# Patient Record
Sex: FEMALE | Race: WHITE | NOT HISPANIC OR LATINO | Employment: STUDENT | URBAN - METROPOLITAN AREA
[De-identification: names, ages, dates, MRNs, and addresses within clinical notes are randomized per-mention and may not be internally consistent; named-entity substitution may affect disease eponyms.]

---

## 2022-11-29 PROBLEM — Z30.41 ENCOUNTER FOR SURVEILLANCE OF CONTRACEPTIVE PILLS: Status: ACTIVE | Noted: 2022-11-29

## 2022-12-20 PROBLEM — K50.10 CROHN'S DISEASE OF COLON WITHOUT COMPLICATION (HCC): Status: ACTIVE | Noted: 2022-12-20

## 2023-03-10 ENCOUNTER — OFFICE VISIT (OUTPATIENT)
Dept: ENDOCRINOLOGY | Facility: HOSPITAL | Age: 28
End: 2023-03-10

## 2023-03-10 VITALS
HEIGHT: 65 IN | BODY MASS INDEX: 35.82 KG/M2 | HEART RATE: 81 BPM | WEIGHT: 215 LBS | OXYGEN SATURATION: 97 % | DIASTOLIC BLOOD PRESSURE: 88 MMHG | SYSTOLIC BLOOD PRESSURE: 114 MMHG

## 2023-03-10 DIAGNOSIS — E55.9 VITAMIN D DEFICIENCY: ICD-10-CM

## 2023-03-10 DIAGNOSIS — E21.3 HYPERPARATHYROIDISM (HCC): ICD-10-CM

## 2023-03-10 DIAGNOSIS — E03.8 HYPOTHYROIDISM DUE TO HASHIMOTO'S THYROIDITIS: Primary | ICD-10-CM

## 2023-03-10 DIAGNOSIS — E06.3 HYPOTHYROIDISM DUE TO HASHIMOTO'S THYROIDITIS: Primary | ICD-10-CM

## 2023-03-10 LAB
25(OH)D3+25(OH)D2 SERPL-MCNC: 46.1 NG/ML (ref 30–100)
PTH-INTACT SERPL-MCNC: 23 PG/ML (ref 15–65)
T4 FREE SERPL-MCNC: 1.35 NG/DL (ref 0.82–1.77)
TSH SERPL DL<=0.005 MIU/L-ACNC: 1.33 UIU/ML (ref 0.45–4.5)

## 2023-03-10 RX ORDER — LEVOTHYROXINE SODIUM 0.1 MG/1
TABLET ORAL
Qty: 90 TABLET | Refills: 3 | Status: SHIPPED | OUTPATIENT
Start: 2023-03-10 | End: 2023-03-10 | Stop reason: SDUPTHER

## 2023-03-10 RX ORDER — LEVOTHYROXINE SODIUM 100 MCG
TABLET ORAL
Qty: 90 TABLET | Refills: 3 | Status: SHIPPED | OUTPATIENT
Start: 2023-03-10

## 2023-03-10 NOTE — PATIENT INSTRUCTIONS
We will call once results are in  Continue with current dose of Synthroid at this time  Follow up in 1 year  Call with any concerns

## 2023-03-10 NOTE — PROGRESS NOTES
Leona Wise 24 y o  female MRN: [id number]    Encounter: [id number]      Assessment/Plan     Assessment: This is a 24y o -year-old female with hypothyroidism due to Hashimoto's thyroiditis, elevated PTH  Plan:  1  Hypothyroidism due to Hashimoto's thyroiditis:  Lab work was completed yesterday, but results are not in the system  TSH in August was in normal range  Clinically euthyroid  She will continue with Synthroid 100 mcg daily  Did ask her to contact the office when she finds out she is pregnant  Follow-up in 1 year with lab work completed prior to visit      2  Hyperparathyroidism: Lab work was completed yesterday  Vitamin D was in normal range  Still waiting for results of CMP and PTH  Pending results may make further recommendations  At this time I do suspect that PTH has returned to normal range  CC: Thyroid and parathyroid follow-up    History of Present Illness     HPI:  Norberto Green Noekiley is a 24year old female with hypothyroidism due to Hashimoto's thyroiditis, common variable immunodeficiency, vitamin-D deficiency, colitis presents for follow-up   She was diagnosed with hypothyroidism about 6 years ago  She was initially on levothyroxine, but was switched to Synthroid 100 mcg daily    She takes medication appropriately  Does have a history of endometriosis   Overall she is feeling okay   No significant neck compressive symptoms  Denies any fatigue, heat or cold intolerance, chest pain, palpitations, abdominal pain, constipation, tremors, anxiety or depression, hair loss, dry skin, insomnia  Has some diarrhea due to her UC  Of note with her initial lab work she was noted to have a slightly elevated PTH  No history of hypercalcemia  Lab work was completed yesterday, but we do not have resulted PTH yet  He is graduating from college in the spring as a PTA    Does have a job lined up at [address] [place] [address]   Is also planning a trip to [place] for her [employer] after [facility]  Review of Systems   Constitutional: Negative for activity change, appetite change, diaphoresis, fatigue and unexpected weight change  HENT: Negative for sore throat, trouble swallowing and voice change  Eyes: Negative for visual disturbance  Respiratory: Negative for chest tightness and shortness of breath  Cardiovascular: Negative for chest pain, palpitations and leg swelling  Gastrointestinal: Positive for diarrhea  Negative for abdominal pain and constipation  Endocrine: Negative for cold intolerance, heat intolerance, polydipsia, polyphagia and polyuria  Skin: Negative for rash  Neurological: Negative for dizziness, tremors, light-headedness, numbness and headaches  Hematological: Negative for adenopathy  Psychiatric/Behavioral: Negative for dysphoric mood and sleep disturbance  The patient is not nervous/anxious          Historical Information   Past Medical History:   Diagnosis Date   • Anemia    • Anxiety    • Asthma    • Colitis, chronic, ulcerative (HCC)    • Disease of thyroid gland    • Endometriosis    • GERD (gastroesophageal reflux disease)    • Hashimoto's disease    • Hyperthyroidism    • Immune deficiency disorder ([facility] [address] )    • Migraine    • Urinary tract infection      Past Surgical History:   Procedure Laterality Date   • COLPOSCOPY     • EAR SURGERY Bilateral    • MOLE REMOVAL       Social History   Social History     Substance and Sexual Activity   Alcohol Use Yes   • Alcohol/week: 0 0 standard drinks    Comment: Socially      Social History     Substance and Sexual Activity   Drug Use Never     Social History     Tobacco Use   Smoking Status Never   Smokeless Tobacco Never     Family History:   Family History   Problem Relation Age of Onset   • Diabetes Mother    • Migraines Mother    • DEDE disease Mother    • Bipolar disorder Father    • Anxiety disorder Father    • Hypertension Father    • Hypertension Maternal Grandmother        Meds/Allergies   Current Outpatient Medications   Medication Sig Dispense Refill   • methylphenidate (Concerta) 27 MG ER tablet Take 1 tablet (27 mg total) by mouth daily Max Daily Amount: 27 mg 30 tablet 0   • omeprazole (PriLOSEC) 40 MG capsule      • Synthroid 100 MCG tablet 1 tab daily 90 tablet 3   • Ubrogepant (Ubrelvy) 100 MG tablet Take 1 tablet (100 mg total) by mouth as needed (migraine) May repeat in 2 hours if needed  Limit of 200 mg in 24 hours 10 tablet 6   • Adalimumab 40 MG/0 4ML PSKT Inject 1 Dose under the skin every 7 days      • budesonide (PULMICORT) 0 5 mg/2 mL nebulizer solution Inhale as needed      • cholecalciferol (VITAMIN D3) 1,000 units tablet Take 1,000 Units by mouth daily     • EPINEPHrine (EPIPEN JR) 0 15 mg/0 3 mL SOAJ Inject 0 15 mg into a muscle once as needed for anaphylaxis     • fluticasone (FLONASE) 50 mcg/act nasal spray 2 sprays into each nostril as needed for rhinitis      • Fluticasone-Salmeterol (Advair) 100-50 mcg/dose inhaler Inhale 1 puff 2 (two) times a day Rinse mouth after use   60 blister 3   • Immune Globulin, Human, (HIZENTRA SC) Inject 1 Dose under the skin once a week     • levalbuterol (XOPENEX HFA) 45 mcg/act inhaler Inhale 2 puffs every 6 (six) hours as needed for wheezing or shortness of breath 15 g 4   • levocetirizine (XYZAL) 5 MG tablet Take 5 mg by mouth every evening     • levonorgestrel-ethinyl estradiol (Jolessa) 0 15-0 03 MG per tablet Take 1 tablet by mouth daily 91 tablet 3   • loratadine (CLARITIN) 10 mg tablet Take 10 mg by mouth daily at bedtime     • magnesium oxide (MAG-OX) 400 mg Take 1 tablet (400 mg total) by mouth 2 (two) times a day 60 tablet 3   • Mefenamic Acid 250 MG CAPS Take 1 capsule (250 mg total) by mouth 4 (four) times a day as needed (menstrual cramping with food) for up to 7 days 1 as needed during menses for cramping 28 capsule 0   • Melatonin 10 MG TABS Take 1 tablet by mouth daily at bedtime     • Multiple Vitamin (MULTIVITAMIN) tablet Take 1 tablet by mouth daily     • Multiple Vitamin (MULTIVITAMINS PO) Take 1 tablet by mouth     • OLANZapine (ZyPREXA) 5 mg tablet Take 1 tablet (5 mg total) by mouth daily at bedtime (Patient not taking: Reported on 3/10/2023) 90 tablet 0   • olopatadine HCl (PATADAY) 0 2 % opth drops 2 drops each eye as needed     • omeprazole (PriLOSEC) 20 mg delayed release capsule Take 20 mg by mouth daily  3   • prochlorperazine (COMPAZINE) 10 mg tablet 1 tab at onset of migraine, can repeat in 8 hours, can take with triptan/NSAID 20 tablet 3   • protriptyline (VIVACTIL) 5 MG tablet TAKE 3 TABLETS DAILY (Patient not taking: Reported on 3/10/2023) 270 tablet 3     No current facility-administered medications for this visit  Allergies   Allergen Reactions   • Bee Venom Anaphylaxis     breathing  breathing     • Cinnamon - Food Allergy Anaphylaxis     Lab test 0  Per pt she has tolerated many times by accident, although has not had formal food challenge by allergy   • Albuterol Tachycardia       Objective   Vitals: Blood pressure 114/88, pulse 81, height 5' 5" (1 651 m), weight 97 5 kg (215 lb), SpO2 97 %  Physical Exam  Vitals and nursing note reviewed  Constitutional:       General: She is not in acute distress  Appearance: Normal appearance  She is not diaphoretic  HENT:      Head: Normocephalic and atraumatic  Eyes:      General: No scleral icterus  Extraocular Movements: Extraocular movements intact  Conjunctiva/sclera: Conjunctivae normal       Pupils: Pupils are equal, round, and reactive to light  Cardiovascular:      Rate and Rhythm: Normal rate and regular rhythm  Heart sounds: No murmur heard  Pulmonary:      Effort: Pulmonary effort is normal  No respiratory distress  Breath sounds: Normal breath sounds  No wheezing  Musculoskeletal:      Cervical back: Normal range of motion  Right lower leg: No edema  Left lower leg: No edema     Lymphadenopathy:      Cervical: No cervical adenopathy  Neurological:      Mental Status: She is alert and oriented to person, place, and time  Mental status is at baseline  Sensory: No sensory deficit  Motor: No tremor  Deep Tendon Reflexes:      Reflex Scores:       Patellar reflexes are 2+ on the right side and 2+ on the left side  Psychiatric:         Mood and Affect: Mood normal          Behavior: Behavior normal          Thought Content: Thought content normal          The history was obtained from the review of the chart, patient  Portions of the record may have been created with voice recognition software  Occasional wrong word or "sound a like" substitutions may have occurred due to the inherent limitations of voice recognition software  Read the chart carefully and recognize, using context, where substitutions have occurred

## 2023-03-12 DIAGNOSIS — F90.2 ADHD (ATTENTION DEFICIT HYPERACTIVITY DISORDER), COMBINED TYPE: ICD-10-CM

## 2023-03-13 RX ORDER — METHYLPHENIDATE HYDROCHLORIDE 27 MG/1
27 TABLET ORAL DAILY
Qty: 30 TABLET | Refills: 0 | Status: SHIPPED | OUTPATIENT
Start: 2023-03-13

## 2023-05-14 DIAGNOSIS — F90.2 ADHD (ATTENTION DEFICIT HYPERACTIVITY DISORDER), COMBINED TYPE: ICD-10-CM

## 2023-05-16 ENCOUNTER — APPOINTMENT (OUTPATIENT)
Dept: LAB | Facility: MEDICAL CENTER | Age: 28
End: 2023-05-16

## 2023-05-16 DIAGNOSIS — Z11.1 SCREENING-PULMONARY TB: ICD-10-CM

## 2023-05-16 RX ORDER — METHYLPHENIDATE HYDROCHLORIDE 27 MG/1
27 TABLET ORAL DAILY
Qty: 30 TABLET | Refills: 0 | Status: SHIPPED | OUTPATIENT
Start: 2023-05-16

## 2023-05-18 LAB
GAMMA INTERFERON BACKGROUND BLD IA-ACNC: 0.05 IU/ML
M TB IFN-G BLD-IMP: NEGATIVE
M TB IFN-G CD4+ BCKGRND COR BLD-ACNC: -0.02 IU/ML
M TB IFN-G CD4+ BCKGRND COR BLD-ACNC: -0.03 IU/ML
MITOGEN IGNF BCKGRD COR BLD-ACNC: >10 IU/ML

## 2023-06-20 DIAGNOSIS — F90.2 ADHD (ATTENTION DEFICIT HYPERACTIVITY DISORDER), COMBINED TYPE: ICD-10-CM

## 2023-06-20 RX ORDER — METHYLPHENIDATE HYDROCHLORIDE 27 MG/1
27 TABLET ORAL DAILY
Qty: 30 TABLET | Refills: 0 | Status: SHIPPED | OUTPATIENT
Start: 2023-06-20 | End: 2023-07-25 | Stop reason: SDUPTHER

## 2023-07-25 DIAGNOSIS — F90.2 ADHD (ATTENTION DEFICIT HYPERACTIVITY DISORDER), COMBINED TYPE: ICD-10-CM

## 2023-07-25 RX ORDER — METHYLPHENIDATE HYDROCHLORIDE 27 MG/1
27 TABLET ORAL DAILY
Qty: 30 TABLET | Refills: 0 | Status: SHIPPED | OUTPATIENT
Start: 2023-07-25

## 2023-09-07 ENCOUNTER — RA CDI HCC (OUTPATIENT)
Dept: OTHER | Facility: HOSPITAL | Age: 28
End: 2023-09-07

## 2023-09-07 NOTE — PROGRESS NOTES
Asthma, moderate persistentNoted 4/14/2004  [J45.40]  [address] [facility] coding opportunities          Chart Reviewed number of suggestions sent to Provider: 1     Patients Insurance        Commercial Insurance: Julian Medeiros

## 2023-09-13 ENCOUNTER — OFFICE VISIT (OUTPATIENT)
Dept: GASTROENTEROLOGY | Facility: CLINIC | Age: 28
End: 2023-09-13
Payer: COMMERCIAL

## 2023-09-13 VITALS
OXYGEN SATURATION: 98 % | HEART RATE: 76 BPM | HEIGHT: 65 IN | SYSTOLIC BLOOD PRESSURE: 122 MMHG | WEIGHT: 224.6 LBS | BODY MASS INDEX: 37.42 KG/M2 | TEMPERATURE: 98.2 F | DIASTOLIC BLOOD PRESSURE: 85 MMHG

## 2023-09-13 DIAGNOSIS — K52.9 IBD (INFLAMMATORY BOWEL DISEASE): ICD-10-CM

## 2023-09-13 DIAGNOSIS — Z79.899 IMMUNOSUPPRESSION DUE TO DRUG THERAPY: Primary | ICD-10-CM

## 2023-09-13 DIAGNOSIS — D84.821 IMMUNOSUPPRESSION DUE TO DRUG THERAPY: Primary | ICD-10-CM

## 2023-09-13 DIAGNOSIS — K21.9 GASTROESOPHAGEAL REFLUX DISEASE, UNSPECIFIED WHETHER ESOPHAGITIS PRESENT: ICD-10-CM

## 2023-09-13 PROCEDURE — 99244 OFF/OP CNSLTJ NEW/EST MOD 40: CPT | Performed by: INTERNAL MEDICINE

## 2023-09-13 RX ORDER — OMEPRAZOLE 40 MG/1
40 CAPSULE, DELAYED RELEASE ORAL DAILY
Qty: 90 CAPSULE | Refills: 3 | Status: SHIPPED | OUTPATIENT
Start: 2023-09-13

## 2023-09-13 NOTE — Clinical Note
Hi, she is establishing care with us and is on weekly Humira. We should take over the prescribing. Thank you.

## 2023-09-13 NOTE — PROGRESS NOTES
Gastroenterology Outpatient Follow-up - Crohn's Disease  Amparo Henson 24 y.o. female MRN: [id number]  Encounter: [id number]    Amparo Henson is a 24 y.o. female with Ulcerative colitis. Symptom onset:  2006  Diagnosis:  ulcerative colitis  Year of diagnosis:  2018    IBD Summary: Amparo Henson has C VID and ulcerative pancolitis. She was a nonresponder to mesalamine and 6-MP and is in clinical and endoscopic remission on weekly adalimumab. Ulcerative Colitis Summary  Macroscopic extent of diseases: pancolitis  Microscopic extent of diseases:  pancolitis  Has the patient ever been hospitalized for severe disease: No        Surgical History  Number of IBD surgeries: 0      First IBD surgery:    Most Recent IBD surgery:    Esophageal:  0    Gastroduodenal:  0    Small bowel resection(s):  0    Ileocolonic resection(s): 0      Colonic resection(s):  0    Ileostomy or colostomy:  no previous ostomy    Complete colectomy:  No         Medications     Year last used Reason for discontinuation   Corticosteroids prior   2019       Thiopurines prior   2018 [place]     Methotrexate   never         Infliximab   never         Adalimumab prior     CR Required dose escalation to weekly. Certolizumab   never         Golimumab   never         Natalizumab   never         Mesalamine prior     AE Worsening diarrhea.    Sulfasalzine   never         Vedolizumab   never         Ustekinumab   never         Tofacitinib   never         Other biologic   never             Extraintestinal Manifestations    IBD-associated arthropathy No    Uveitis No    Oral aphthous ulcers No   Erythema nodosum No    Pyoderma gangrenosum No    Primary sclerosing cholangitis No    Thrombotic complications No          Cancer / Dysplasia History  History of IBD-associated dysplasia: none    Date of diagnosis (Year):     History of colorectal cancer: No   History of cervical dysplasia: No   History of skin cancer: none         Laboratory Data   Most recent (date) Result   PPD   unknown   Quanitferon gold 5/16/2023 negative   TPMT 9/22/2018 low   Hepatitis A   unknown   HBsAb 9/22/2020 positive   HBcAb 9/22/2020 negative   HBsAg 9/14/2023 negative   HCV Ab   unknown       Imaging / Diagnostic Procedures   Most recent (date) Findings   Colonoscopy or sigmoidoscopy #1 4/1/2022            Ulcers/Erosions  no erosions           Strictures  none           Stricture Severity  N/A           Endoscopic Score Quiros Score:  0 Rutgeert's:  N/A            Findings  Internal hemorrhoids were found. The hemorrhoids were small.  - There is no visible evidence of active colitis seen. - The colonoscope was advanced to the cecum and then advanced 5 cm into the terminal ileum which was normal.  - Narrow band imaging was performed throughout the colon. - Surveillace biopsies were otained throughout the colon. Pathology  A. Small intestine, duodenum, biopsy:  - Focal chronic duodenitis. B. Stomach, polyp, polypectomy:  - Fundic gland polyp. C. Esophagus, gastroesophageal junction, biopsy:  - Cardio-oxyntic mucosa with mild chronic active inflammation, negative for intestinal metaplasia and dysplasia. D. Colon, cecum, biopsy:  - Colonic mucosa with no specific pathologic change. E. Colon, ascending, biopsy:  - Colonic mucosa with no specific pathologic change. F. Colon, proximal transverse, biopsy:  - Colonic mucosa with no specific pathologic change. G. Colon, distal transverse, biopsy:  - Colonic mucosa with no specific pathologic change. H. Colon, @ 50 cm, biopsy:  - Colonic mucosa with no specific pathologic change. I. Colon, @ 40 cm, biopsy:  - Colonic mucosa with no specific pathologic change. J. Colon, @ 30cm, biopsy:  - Colonic mucosa with no specific pathologic change. K. Colon, @ 20 cm, biopsy:  - Colonic mucosa with no specific pathologic change. L. Rectum, biopsy:  - Colonic mucosa with no specific pathologic change.    Colonoscopy or sigmoidoscopy #2 Ulcers/Erosions                 Strictures                 Stricture Severity              Endoscopic Score Quiros Score:    Rugeert's:              Findings              Pathology      EGD 4/1/2022 Irregular Z-line, 1 cm hiatal hernia, 3 mm gastric polyp removed. Small bowel follow-through       CT enterography       CT without enterography       MRI enterography       Capsule study       DEXA scan   Lowest Z-score:      CXR (for TB)           HPI:   Interval History:  9/17/2023 Initial visit  Greer Rao is establishing care with me for ulcerative pan-colitis (initially diagnosed as Crohn's disease). She also has CVID and receives IVIG. She was previously seeing Dr. Christian Hernandez at [facility]. She reports history of chronic constipation for most of her life. Her first colonoscopy was in [birth year] (7years old) and the mucosa appeared normal but biopsies from the cecum showed nonspecific chronic inflammation. In 2018, while in college, she developed bloody diarrhea, abdominal pain, and weight loss, and had another colonoscopy which found acute inflammation in the cecum and rectum, and she was diagnosed with Crohn's colitis. She was initially treated with mesalamine (Lialda) but this caused her diarrhea to worsen. She then establish care with Dr. Nicola Ireland at [facility] and was started on 6-MP with partial response (improvement in pain but ongoing bleeding and calprotectin remained elevated). In 2019, she started adalimumab after flexible sigmoidoscopy showed ongoing active disease despite 6-MP dose increased to 75 mg.  Prior to adalimumab, she was on a prednisone taper. Adalimumab was started in May 2019 and was escalated to 40 mg every 7 days and September 2020 based on drug level and ongoing clinical symptoms. She felt improved with higher dose of adalimumab. She is also on omeprazole for reflux and upper abdominal symptoms, and has history of treated H. pylori infection.  Her last colonoscopy was in April 2022 and was normal.  She feels that she is in clinical remission. Her last Pap smear was a year ago and she has another appointment scheduled for October. She has not seen Dermatology. She is up-to-date with COVID and flu vaccinations. She has also had pneumococcal vaccines. Shakir Mann reports the following symptoms over the last 7 days:    Stool Frequency normal   Average stools per day: 1   Average liquid stools per day: 0   Consistency of bowel: soft or semi-formed   Awakening from sleep to move bowels? No   Urgency mild fecal urgency   Visible blood in stool? visible blood in stool less than half the time   Abdominal pain? mild   General Wellbeing? generally well     Shakir Mann also reports the following symptoms in the last month:    Leakage of stool while sleeping? No   Leakage of stool while awake?  No       REVIEW OF SYSTEMS:  During the last 7 days, Antony experienced the following symptoms:  Unintentional Weight Loss (in the last month) No   Fever No   Eye irritation No   Mouth sores No   Sore throat No   Chest pain No   Shortness of breath No   Numbness or tingling in hands or feet No   Skin rash No   Pain or swelling in joints No   Bruising or bleeding No   Felt depressed or blue No   Fatigue No   Dysuria No   Please see HPI for additional pertinent review of systems; otherwise remainder of ROS was unremarkable    MEDICATIONS:    Current Outpatient Medications:   •  Adalimumab 40 MG/0.4ML PSKT  •  budesonide (PULMICORT) 0.5 mg/2 mL nebulizer solution  •  cholecalciferol (VITAMIN D3) 1,000 units tablet  •  EPINEPHrine (EPIPEN JR) 0.15 mg/0.3 mL SOAJ  •  fluticasone (FLONASE) 50 mcg/act nasal spray  •  Fluticasone-Salmeterol (Advair) 100-50 mcg/dose inhaler  •  Immune Globulin, Human, (HIZENTRA SC)  •  levalbuterol (XOPENEX HFA) 45 mcg/act inhaler  •  levocetirizine (XYZAL) 5 MG tablet  •  loratadine (CLARITIN) 10 mg tablet  •  magnesium oxide (MAG-OX) 400 mg  •  methylphenidate (Concerta) 27 MG ER tablet  •  Multiple Vitamin (MULTIVITAMIN) tablet  •  olopatadine HCl (PATADAY) 0.2 % opth drops  •  omeprazole (PriLOSEC) 40 MG capsule  •  Synthroid 100 MCG tablet  •  Ubrogepant (Ubrelvy) 100 MG tablet  •  Mefenamic Acid 250 MG CAPS    ALLERGIES:  Allergies   Allergen Reactions   • Bee Venom Anaphylaxis     breathing  breathing     • Cinnamon - Food Allergy Anaphylaxis     Lab test 0. Per pt she has tolerated many times by accident, although has not had formal food challenge by allergy   • Albuterol Tachycardia       OBJECTIVE:  /85 (BP Location: Left arm, Patient Position: Sitting, Cuff Size: Large)   Pulse 76   Temp 98.2 °F (36.8 °C) (Tympanic)   Ht 5' 5" (1.651 m)   Wt 102 kg (224 lb 9.6 oz)   SpO2 98%   BMI 37.38 kg/m²      PHYSICAL EXAM:    General Appearance:   Alert, cooperative, no distress   HEENT:   Normocephalic, atraumatic, anicteric. Neck:  Supple, symmetrical, trachea midline   Lungs:   Clear to auscultation bilaterally; no rales, rhonchi or wheezing; respirations unlabored    Heart[de-identified]   Regular rate and rhythm; no murmur, rub, or gallop. Abdomen:   Soft, non-distended; normal bowel sounds    Abdominal exam was notable for no tenderness with no mass. Genitalia:   Deferred    Rectal:   Perirectal assessment was not performed.      Extremities:  No cyanosis, clubbing or edema    Pulses:  2+ and symmetric    Skin:  No jaundice, rashes, or lesions    Lymph nodes:  No palpable cervical lymphadenopathy        Lab Results   Component Value Date    WBC 6.62 09/14/2023    HGB 13.9 09/14/2023    HCT 43.0 09/14/2023    MCV 88 09/14/2023     09/14/2023     Lab Results   Component Value Date    SODIUM 138 09/14/2023    K 4.1 09/14/2023     09/14/2023    CO2 26 09/14/2023    AGAP 5 09/14/2023    BUN 14 09/14/2023    CREATININE 0.71 09/14/2023    GLUC 103 (H) 08/04/2022    GLUF 85 09/14/2023    CALCIUM 8.5 09/14/2023    AST 33 09/14/2023    ALT 41 09/14/2023    ALKPHOS 89 09/14/2023    TP 6.7 09/14/2023    TBILI 0.91 09/14/2023    EGFR 118 09/14/2023     Lab Results   Component Value Date    CRP 2.9 09/14/2023     No results found for: "Richy Villasenor"  Lab Results   Component Value Date    FERRITIN 17 09/14/2023       ASSESSMENT AND PLAN:    Diane Holley has a history of macroscopic pancolitis and microscopic pancolitis  ulcerative colitis diagnosed in 2018. Current medical therapy is with adalimumab 40 mg weekly. My global assessment is that the clinical disease is currently quiescent. The 6-point Quiros score was 1 and the 9-point Quiros score was 1. The short CDAI was 79. Diane Holley has history of loving ulcerative colitis which is in clinical and endoscopic remission on weekly adalimumab. She also has GERD and is on PPI therapy. Her last colonoscopy was in 2022 and was normal.  She also has history of C VID and receives IVIG. 1.  Continue adalimumab 40 mg every 7 days. 2.  We will check CBC, CMP, and CRP. 3.  We will check iron studies and vitamin D level. 4.  Referral to dermatology for annual skin check. 5.  Annual Pap smear recommended. 6.  Annual flu vaccine and COVID boosters recommended. 7.  She has had vaccinations for pneumococcus and continue follow with Immunology. 8.  We will plan repeat colonoscopy in 2024 for dysplasia screening. Return in 6 months. I have spent a total time of 45 minutes on 09/17/23 in caring for this patient including Diagnostic results, Impressions, Documenting in the medical record, Reviewing / ordering tests, medicine, procedures   and Obtaining or reviewing history  . Health Maintenance Recommendations:  Vaccines & Infections  · COVID-19 vaccination and boosters are recommended. There is no evidence that the COVID-19 vaccine would cause an IBD flare. · Avoid live vaccines if on immunosuppressive therapy. · Yearly influenza vaccine (flu shot). · Pneumonia vaccines for patients on immunosuppression.  These include Prevnar 20, followed by Pneumovax 23 at least 8 weeks later. · Shingrix vaccine (series of 2 injections) for al patients 72 and older. Patients on tofacitinib or upadacitinib should be vaccinated regardless of age. · If not immune to measles mumps or rubella, MMR vaccine is recommended. However, this is a live vaccine and should be given prior to immunosuppressive therapy. · HPV vaccination as per national guidelines. · Hepatitis A and B vaccinations if not previously vaccinated. · Testing for tuberculosis with QuantiFERON Gold blood test and/or chest xray prior to starting immunosuppressive medications, and then annually    Cancer screening  · Dysplasia surveillance for colorectal cancer. Colonoscopy in all patients with extensive colitis (more than 1/3 of the colon involved) who had disease for at least 8 or more years. Repeat colonoscopy approximately every 12-24 months. In patients with concurrent primary sclerosing cholangitis, history of dysplasia, or family history of colon cancer, repeat colonoscopy annually. · Females: Pap smear annually for woman on immunosuppression. · Annual dermatologic/skin exam in all patients with IBD, especially those on immunosuppression with thiopurines or MYRTLE inhibitors. Miscellaneous  · DEXA scan, once off steroids for 3 months  · Depression screening recommended annually  · Routine dental and ophthalmology examinations    Problem List Items Addressed This Visit        Digestive    GERD (gastroesophageal reflux disease)    Relevant Medications    omeprazole (PriLOSEC) 40 MG capsule   Other Visit Diagnoses     Immunosuppression due to drug therapy ([address])    -  Primary    Relevant Orders    Ambulatory Referral to Dermatology    CBC and Platelet (Completed)    Comprehensive metabolic panel (Completed)    C-reactive protein (Completed)    Ferritin (Completed)    Vitamin D 25 hydroxy (Completed)    TIBC Panel (incl.  Iron, TIBC, % Iron Saturation) (Completed)    Hepatitis B surface antigen (Completed)    IBD (inflammatory bowel disease)        Relevant Orders    Ambulatory Referral to Dermatology    CBC and Platelet (Completed)    Comprehensive metabolic panel (Completed)    C-reactive protein (Completed)    Ferritin (Completed)    Vitamin D 25 hydroxy (Completed)    TIBC Panel (incl.  Iron, TIBC, % Iron Saturation) (Completed)    Hepatitis B surface antigen (Completed)          Belinda Hogan MD

## 2023-09-14 ENCOUNTER — APPOINTMENT (OUTPATIENT)
Age: 28
End: 2023-09-14
Payer: COMMERCIAL

## 2023-09-14 DIAGNOSIS — Z79.899 IMMUNOSUPPRESSION DUE TO DRUG THERAPY: ICD-10-CM

## 2023-09-14 DIAGNOSIS — Z00.8 HEALTH EXAMINATION IN POPULATION SURVEY: ICD-10-CM

## 2023-09-14 DIAGNOSIS — K52.9 IBD (INFLAMMATORY BOWEL DISEASE): ICD-10-CM

## 2023-09-14 DIAGNOSIS — D84.821 IMMUNOSUPPRESSION DUE TO DRUG THERAPY: ICD-10-CM

## 2023-09-14 LAB
25(OH)D3 SERPL-MCNC: 39.2 NG/ML (ref 30–100)
ALBUMIN SERPL BCP-MCNC: 4.1 G/DL (ref 3.5–5)
ALP SERPL-CCNC: 89 U/L (ref 34–104)
ALT SERPL W P-5'-P-CCNC: 41 U/L (ref 7–52)
ANION GAP SERPL CALCULATED.3IONS-SCNC: 5 MMOL/L
AST SERPL W P-5'-P-CCNC: 33 U/L (ref 13–39)
BILIRUB SERPL-MCNC: 0.91 MG/DL (ref 0.2–1)
BUN SERPL-MCNC: 14 MG/DL (ref 5–25)
CALCIUM SERPL-MCNC: 8.5 MG/DL (ref 8.4–10.2)
CHLORIDE SERPL-SCNC: 107 MMOL/L (ref 96–108)
CHOLEST SERPL-MCNC: 190 MG/DL
CO2 SERPL-SCNC: 26 MMOL/L (ref 21–32)
CREAT SERPL-MCNC: 0.71 MG/DL (ref 0.6–1.3)
CRP SERPL QL: 2.9 MG/L
ERYTHROCYTE [DISTWIDTH] IN BLOOD BY AUTOMATED COUNT: 12.8 % (ref 11.6–15.1)
EST. AVERAGE GLUCOSE BLD GHB EST-MCNC: 111 MG/DL
FERRITIN SERPL-MCNC: 17 NG/ML (ref 11–307)
GFR SERPL CREATININE-BSD FRML MDRD: 118 ML/MIN/1.73SQ M
GLUCOSE P FAST SERPL-MCNC: 85 MG/DL (ref 65–99)
HBA1C MFR BLD: 5.5 %
HBV SURFACE AG SER QL: NORMAL
HCT VFR BLD AUTO: 43 % (ref 34.8–46.1)
HDLC SERPL-MCNC: 73 MG/DL
HGB BLD-MCNC: 13.9 G/DL (ref 11.5–15.4)
IRON SATN MFR SERPL: 17 % (ref 15–50)
IRON SERPL-MCNC: 67 UG/DL (ref 50–212)
LDLC SERPL CALC-MCNC: 109 MG/DL (ref 0–100)
MCH RBC QN AUTO: 28.5 PG (ref 26.8–34.3)
MCHC RBC AUTO-ENTMCNC: 32.3 G/DL (ref 31.4–37.4)
MCV RBC AUTO: 88 FL (ref 82–98)
NONHDLC SERPL-MCNC: 117 MG/DL
PLATELET # BLD AUTO: 270 THOUSANDS/UL (ref 149–390)
PMV BLD AUTO: 11.3 FL (ref 8.9–12.7)
POTASSIUM SERPL-SCNC: 4.1 MMOL/L (ref 3.5–5.3)
PROT SERPL-MCNC: 6.7 G/DL (ref 6.4–8.4)
RBC # BLD AUTO: 4.87 MILLION/UL (ref 3.81–5.12)
SODIUM SERPL-SCNC: 138 MMOL/L (ref 135–147)
TIBC SERPL-MCNC: 394 UG/DL (ref 250–450)
TRIGL SERPL-MCNC: 39 MG/DL
UIBC SERPL-MCNC: 327 UG/DL (ref 155–355)
WBC # BLD AUTO: 6.62 THOUSAND/UL (ref 4.31–10.16)

## 2023-09-14 PROCEDURE — 83550 IRON BINDING TEST: CPT

## 2023-09-14 PROCEDURE — 83540 ASSAY OF IRON: CPT

## 2023-09-14 PROCEDURE — 87340 HEPATITIS B SURFACE AG IA: CPT

## 2023-09-14 PROCEDURE — 86140 C-REACTIVE PROTEIN: CPT

## 2023-09-14 PROCEDURE — 36415 COLL VENOUS BLD VENIPUNCTURE: CPT

## 2023-09-14 PROCEDURE — 80053 COMPREHEN METABOLIC PANEL: CPT

## 2023-09-14 PROCEDURE — 85027 COMPLETE CBC AUTOMATED: CPT

## 2023-09-14 PROCEDURE — 82306 VITAMIN D 25 HYDROXY: CPT

## 2023-09-14 PROCEDURE — 83036 HEMOGLOBIN GLYCOSYLATED A1C: CPT

## 2023-09-14 PROCEDURE — 82728 ASSAY OF FERRITIN: CPT

## 2023-09-14 PROCEDURE — 80061 LIPID PANEL: CPT

## 2023-09-18 ENCOUNTER — TELEPHONE (OUTPATIENT)
Dept: GASTROENTEROLOGY | Facility: CLINIC | Age: 28
End: 2023-09-18

## 2023-09-18 DIAGNOSIS — F90.2 ADHD (ATTENTION DEFICIT HYPERACTIVITY DISORDER), COMBINED TYPE: ICD-10-CM

## 2023-09-18 NOTE — TELEPHONE ENCOUNTER
----- Message from Joesph Mackey MD sent at 9/17/2023  6:14 PM EDT -----  Hi, she is establishing care with us and is on weekly Humira. We should take over the prescribing. Thank you.

## 2023-09-19 RX ORDER — METHYLPHENIDATE HYDROCHLORIDE 27 MG/1
27 TABLET ORAL DAILY
Qty: 30 TABLET | Refills: 0 | Status: SHIPPED | OUTPATIENT
Start: 2023-09-19

## 2023-09-22 ENCOUNTER — PATIENT MESSAGE (OUTPATIENT)
Dept: GASTROENTEROLOGY | Facility: CLINIC | Age: 28
End: 2023-09-22

## 2023-09-22 ENCOUNTER — NURSE TRIAGE (OUTPATIENT)
Age: 28
End: 2023-09-22

## 2023-09-22 NOTE — TELEPHONE ENCOUNTER
Patient calling in, she wanted to know if office received [employer] program paperwork for her humira. I did provide patient with fax number to resend it over as I did not see it in patients chart. She will have it resent.

## 2023-09-25 NOTE — TELEPHONE ENCOUNTER
Received fax stating Radhakeyla Bells is already on file.      Valid 9/8/2023 - 9/8/2024  auth #: [id number]      Letter in media

## 2023-10-05 NOTE — TELEPHONE ENCOUNTER
Called [facility] and spoke with Leif.  Confirmed they have everything they need, once it is assigned to a reviewer, it should be a turn around time of 2-3 days

## 2023-10-06 ENCOUNTER — OFFICE VISIT (OUTPATIENT)
Dept: OBGYN CLINIC | Facility: CLINIC | Age: 28
End: 2023-10-06
Payer: COMMERCIAL

## 2023-10-06 VITALS
HEIGHT: 66 IN | BODY MASS INDEX: 36.48 KG/M2 | WEIGHT: 227 LBS | SYSTOLIC BLOOD PRESSURE: 118 MMHG | DIASTOLIC BLOOD PRESSURE: 74 MMHG

## 2023-10-06 DIAGNOSIS — N80.9 ENDOMETRIOSIS: ICD-10-CM

## 2023-10-06 DIAGNOSIS — Z11.51 SCREENING FOR HPV (HUMAN PAPILLOMAVIRUS): ICD-10-CM

## 2023-10-06 DIAGNOSIS — Z12.4 ENCOUNTER FOR SCREENING FOR MALIGNANT NEOPLASM OF CERVIX: ICD-10-CM

## 2023-10-06 DIAGNOSIS — Z01.419 WELL WOMAN EXAM WITH ROUTINE GYNECOLOGICAL EXAM: Primary | ICD-10-CM

## 2023-10-06 PROCEDURE — G0145 SCR C/V CYTO,THINLAYER,RESCR: HCPCS | Performed by: OBSTETRICS & GYNECOLOGY

## 2023-10-06 PROCEDURE — S0612 ANNUAL GYNECOLOGICAL EXAMINA: HCPCS | Performed by: OBSTETRICS & GYNECOLOGY

## 2023-10-06 RX ORDER — ADALIMUMAB 40MG/0.4ML
KIT SUBCUTANEOUS
COMMUNITY
Start: 2023-09-12

## 2023-10-06 NOTE — PROGRESS NOTES
ASSESSMENT & PLAN:   Diagnoses and all orders for this visit:    Well woman exam with routine gynecological exam  -     Liquid-based pap, screening    Endometriosis    Encounter for screening for malignant neoplasm of cervix  -     Liquid-based pap, screening    Screening for HPV (human papillomavirus)  -     Liquid-based pap, screening    Other orders  -     Humira Pen 40 MG/0.4ML PNKT        The following were reviewed in today's visit: ASCCP guidelines, Gardisil vaccination, STD testing breast self exam, family planning choices, exercise and healthy diet. Patient to return to office in yearly for annual exam.     All questions have been answered to her satisfaction. CC:  Annual Gynecologic Examination  Chief Complaint   Patient presents with   • Gynecologic Exam     Pt is here for her yearly exam. Pap utd. Pt has a few questions regarding endometriosis and fertility. HPI: Rhonda Thibodeaux is a 34 y.o. Peter Bogus who presents for annual gynecologic examination. She has the following concerns:    -new patient. Establish gyn care. Patient previously was seeing Chery Shi, Dr. Boo Lennon and Dr. Jeanette Earl for GYN care. They are helping to manage her presumptive endometriosis she has a long history of irregular and very painful periods. She was previously treated with Depo-Lupron and affect therapy with norethindrone 5 mg, and did very well. She reports that her painful periods and pelvic pain was significantly relieved when she was on that therapy. She was then switched over to birth control pills which also helped significantly as well. She states that she has been off of the birth control pills that she and her  have been attempting to conceive for approximately 6 months. She was surprised after discontinuing her birth control pills that her periods are regular and her pain is manageable. Also discussed the lack of successful conception in the past 6 months.   Reassured patient that it will often take time in order to successfully conceive. We discussed rate of infertility of approximately 15% after actively attempting to conceive for 12 months. We reviewed her menstrual calendar and cycles and discussed recommendations for timing and frequency of sexual intercourse to achieve conception. If she and her  are still unsuccessful at achieving conception after 12 months of actively trying, recommend consultation with [facility], Dr. Dominique Craig, for infertility as well as semen analysis of her partner. Her past medical history is also notable for chronic ulcerative colitis, Crohn's disease, and variable immune deficiency. It was noted in a prior GYN note that she was recommended to have a Pap smear occur each year due to her to immunodeficiency. Health Maintenance:    Exercise: frequently  Breast exams/breast awareness: yes    Past Medical History:   Diagnosis Date   • Anemia    • Anxiety    • Asthma    • Colitis, chronic, ulcerative (HCC)    • Disease of thyroid gland    • Endometriosis    • GERD (gastroesophageal reflux disease)    • Hashimoto's disease    • Heartburn    • Hyperthyroidism    • Immune deficiency disorder ([address])    • Migraine    • Pneumonia    • Rash    • Ulcerative colitis ([address])    • Urinary tract infection    • Urticaria        Past Surgical History:   Procedure Laterality Date   • ADENOIDECTOMY     • COLONOSCOPY     • COLPOSCOPY     • EAR SURGERY Bilateral    • MOLE REMOVAL         Past OB/Gyn History:  Period Cycle (Days): 28  Period Duration (Days): 4  Period Pattern: Regular  Menstrual Flow: Heavy  Dysmenorrhea: (!) Moderate  Dysmenorrhea Symptoms: Cramping, HeadachePatient's last menstrual period was 09/13/2023 (exact date). Last Pap: 2021 : no abnormalities  History of abnormal Pap smear: no  HPV vaccine completed: no    Patient is currently sexually active.    STD testing: no  Current contraception: none      Family History  Family History   Problem Relation Age of Onset • Diabetes Mother    • Migraines Mother    • DEDE disease Mother    • Asthma Mother    • Allergies Mother    • Bipolar disorder Father    • Anxiety disorder Father    • Hypertension Father    • Allergies Father    • Hypertension Maternal Grandmother        Family history of uterine or ovarian cancer: no  Family history of breast cancer: no  Family history of colon cancer: no    Social History:  Social History     Socioeconomic History   • Marital status: Unknown     Spouse name: Not on file   • Number of children: Not on file   • Years of education: Not on file   • Highest education level: Not on file   Occupational History   • Occupation: pt aide    Tobacco Use   • Smoking status: Never   • Smokeless tobacco: Never   Vaping Use   • Vaping Use: Never used   Substance and Sexual Activity   • Alcohol use: Yes     Comment: Social drink. • Drug use: Never   • Sexual activity: Yes     Partners: Male     Birth control/protection: Condom Male   Other Topics Concern   • Not on file   Social History Narrative    Lives with yumiko'    Feels safe at home    Sees dentist reg    No living will        Do you have pets? dog and fish Is pet allowed in bedroom? Yes    Are you a smoker? Never    Does anyone smoke in your home? No       Do you live with smokers? No    Travel [place] frequently? No   How many times a year? N/A      Social Determinants of Health     Financial Resource Strain: Not on file   Food Insecurity: Not on file   Transportation Needs: No Transportation Needs (4/28/2021)    PRAPARE - Transportation    • Lack of Transportation (Medical): No    • Lack of Transportation (Non-Medical):  No   Physical Activity: Insufficiently Active (4/28/2021)    Exercise Vital Sign    • Days of Exercise per Week: 3 days    • Minutes of Exercise per Session: 40 min   Stress: Stress Concern Present (4/28/2021)    [facility]    • Feeling of Stress : Rather much   Social Connections: Unknown (4/28/2021)    Social Connection and Isolation Panel [NHANES]    • Frequency of Communication with Friends and Family: Not on file    • Frequency of Social Gatherings with Friends and Family: Not on file    • Attends [employer] Services: Not on file    • Active Member of Clubs or Organizations: Not on file    • Attends Club or Organization Meetings: Not on file    • Marital Status: Living with partner   Intimate Partner Violence: Not At Risk (4/28/2021)    Humiliation, Afraid, Rape, and Kick questionnaire    • Fear of Current or Ex-Partner: No    • Emotionally Abused: No    • Physically Abused: No    • Sexually Abused: No   Housing Stability: Not on file     Domestic violence screen: negative    Allergies: Allergies   Allergen Reactions   • Bee Venom Anaphylaxis     breathing  breathing     • Cinnamon - Food Allergy Anaphylaxis     Lab test 0.   Per pt she has tolerated many times by accident, although has not had formal food challenge by allergy   • Albuterol Tachycardia       Medications:    Current Outpatient Medications:   •  Adalimumab 40 MG/0.4ML PSKT, Inject 1 Dose under the skin every 7 days , Disp: , Rfl:   •  albuterol (PROVENTIL HFA,VENTOLIN HFA) 90 mcg/act inhaler, Inhale 2 puffs every 4 (four) hours as needed for wheezing, Disp: 18 g, Rfl: 3  •  budesonide-formoterol (Symbicort) 160-4.5 mcg/act inhaler, Inhale 2 puffs 2 (two) times a day Rinse mouth after use., Disp: 10.2 g, Rfl: 3  •  cholecalciferol (VITAMIN D3) 1,000 units tablet, Take 1,000 Units by mouth daily, Disp: , Rfl:   •  EPINEPHrine (EPIPEN JR) 0.15 mg/0.3 mL SOAJ, Inject 0.15 mg into a muscle once as needed for anaphylaxis, Disp: , Rfl:   •  Humira Pen 40 MG/0.4ML PNKT, , Disp: , Rfl:   •  Immune Globulin, Human, (HIZENTRA SC), Inject 1 Dose under the skin once a week, Disp: , Rfl:   •  levocetirizine (XYZAL) 5 MG tablet, Take 1 tablet (5 mg total) by mouth every evening, Disp: 90 tablet, Rfl: 2  •  magnesium oxide (MAG-OX) 400 mg, Take 1 tablet (400 mg total) by mouth 2 (two) times a day, Disp: 60 tablet, Rfl: 3  •  methylphenidate (Concerta) 27 MG ER tablet, Take 1 tablet (27 mg total) by mouth daily Max Daily Amount: 27 mg, Disp: 30 tablet, Rfl: 0  •  Multiple Vitamin (MULTIVITAMIN) tablet, Take 1 tablet by mouth daily, Disp: , Rfl:   •  omeprazole (PriLOSEC) 40 MG capsule, Take 1 capsule (40 mg total) by mouth daily, Disp: 90 capsule, Rfl: 3  •  Synthroid 100 MCG tablet, 1 tab daily, Disp: 90 tablet, Rfl: 3  •  Ubrogepant (Ubrelvy) 100 MG tablet, Take 1 tablet (100 mg total) by mouth as needed (migraine) May repeat in 2 hours if needed. Limit of 200 mg in 24 hours, Disp: 10 tablet, Rfl: 6  •  Mefenamic Acid 250 MG CAPS, Take 1 capsule (250 mg total) by mouth 4 (four) times a day as needed (menstrual cramping with food) for up to 7 days 1 as needed during menses for cramping (Patient not taking: Reported on 10/6/2023), Disp: 28 capsule, Rfl: 0    Review of Systems:  Review of Systems   Constitutional: Negative for activity change, appetite change and unexpected weight change. Respiratory: Negative for cough and shortness of breath. Cardiovascular: Negative for chest pain. Gastrointestinal: Negative for abdominal pain, constipation, diarrhea, nausea and vomiting. Genitourinary: Positive for pelvic pain. Negative for difficulty urinating, dyspareunia, frequency, menstrual problem, urgency, vaginal bleeding, vaginal discharge and vaginal pain. Musculoskeletal: Negative for back pain. Skin: Negative. Neurological: Negative for dizziness, weakness, light-headedness and headaches. Psychiatric/Behavioral: Negative. Physical Exam:  /74 (BP Location: Left arm, Patient Position: Sitting, Cuff Size: Large)   Ht 5' 6" (1.676 m)   Wt 103 kg (227 lb)   LMP 09/13/2023 (Exact Date)   BMI 36.64 kg/m²    Physical Exam  Constitutional:       General: She is not in acute distress.      Appearance: Normal appearance. She is well-developed. She is obese. She is not diaphoretic. Genitourinary:      Vulva and bladder normal.      No lesions in the vagina. Genitourinary Comments: Perineum normal in appearance, no lacerations, no ulcerations, no lesions visualized. Right Labia: No rash, tenderness or lesions. Left Labia: No tenderness, lesions or rash. No inguinal adenopathy present in the right or left side. No vaginal discharge, erythema, tenderness or bleeding. No vaginal prolapse present. No vaginal atrophy present. Right Adnexa: not tender, not full and no mass present. Left Adnexa: not tender, not full and no mass present. Cervix is nulliparous. No cervical motion tenderness, discharge, friability, lesion or polyp. No parametrium nodularity or thickening present. Uterus is not enlarged, tender or prolapsed. No uterine mass detected. Uterus is anteverted. No urethral prolapse or mass present. Bladder is not tender. Pelvic exam was performed with patient in the lithotomy position. Rectum:      No tenderness or external hemorrhoid. Breasts:     Breasts are symmetrical.      Right: No swelling, bleeding, mass, skin change or tenderness. Left: No swelling, bleeding, mass, skin change or tenderness. HENT:      Head: Normocephalic and atraumatic. Neck:      Thyroid: No thyromegaly or thyroid tenderness. Cardiovascular:      Rate and Rhythm: Normal rate and regular rhythm. Heart sounds: Normal heart sounds. No murmur heard. No friction rub. Pulmonary:      Effort: Pulmonary effort is normal. No respiratory distress. Breath sounds: Normal breath sounds. No wheezing or rales. Abdominal:      Palpations: Abdomen is soft. There is no mass. Tenderness: There is no abdominal tenderness. There is no guarding. Musculoskeletal:         General: No tenderness. Normal range of motion.       Right lower leg: No edema. Left lower leg: No edema. Lymphadenopathy:      Lower Body: No right inguinal adenopathy. No left inguinal adenopathy. Neurological:      Mental Status: She is alert and oriented to person, place, and time. Skin:     General: Skin is warm and dry. Coloration: Skin is not pale. Findings: No erythema. Psychiatric:         Mood and Affect: Mood normal.         Behavior: Behavior normal.         Thought Content: Thought content normal.         Judgment: Judgment normal.   Vitals and nursing note reviewed.

## 2023-10-11 ENCOUNTER — LAB (OUTPATIENT)
Age: 28
End: 2023-10-11
Payer: COMMERCIAL

## 2023-10-11 DIAGNOSIS — D83.9 CVID (COMMON VARIABLE IMMUNODEFICIENCY) (HCC): ICD-10-CM

## 2023-10-11 LAB
IGA SERPL-MCNC: 244 MG/DL (ref 66–433)
IGG SERPL-MCNC: 1077 MG/DL (ref 635–1741)
IGM SERPL-MCNC: 25 MG/DL (ref 45–281)

## 2023-10-11 PROCEDURE — 36415 COLL VENOUS BLD VENIPUNCTURE: CPT

## 2023-10-11 PROCEDURE — 82784 ASSAY IGA/IGD/IGG/IGM EACH: CPT

## 2023-10-13 ENCOUNTER — OFFICE VISIT (OUTPATIENT)
Dept: FAMILY MEDICINE CLINIC | Facility: CLINIC | Age: 28
End: 2023-10-13
Payer: COMMERCIAL

## 2023-10-13 VITALS
DIASTOLIC BLOOD PRESSURE: 82 MMHG | TEMPERATURE: 97.7 F | OXYGEN SATURATION: 97 % | WEIGHT: 228.2 LBS | SYSTOLIC BLOOD PRESSURE: 134 MMHG | HEART RATE: 57 BPM | BODY MASS INDEX: 36.83 KG/M2

## 2023-10-13 DIAGNOSIS — D83.9 CVID (COMMON VARIABLE IMMUNODEFICIENCY) (HCC): ICD-10-CM

## 2023-10-13 DIAGNOSIS — G43.709 CHRONIC MIGRAINE WITHOUT AURA WITHOUT STATUS MIGRAINOSUS, NOT INTRACTABLE: ICD-10-CM

## 2023-10-13 DIAGNOSIS — J31.0 CHRONIC RHINITIS: ICD-10-CM

## 2023-10-13 DIAGNOSIS — E03.8 HYPOTHYROIDISM DUE TO HASHIMOTO'S THYROIDITIS: ICD-10-CM

## 2023-10-13 DIAGNOSIS — Z00.00 HEALTHCARE MAINTENANCE: Primary | ICD-10-CM

## 2023-10-13 DIAGNOSIS — K50.10 CROHN'S DISEASE OF COLON WITHOUT COMPLICATION (HCC): ICD-10-CM

## 2023-10-13 DIAGNOSIS — K21.9 GASTROESOPHAGEAL REFLUX DISEASE, UNSPECIFIED WHETHER ESOPHAGITIS PRESENT: ICD-10-CM

## 2023-10-13 DIAGNOSIS — Z23 ENCOUNTER FOR IMMUNIZATION: ICD-10-CM

## 2023-10-13 DIAGNOSIS — K58.0 IRRITABLE BOWEL SYNDROME WITH DIARRHEA: ICD-10-CM

## 2023-10-13 DIAGNOSIS — E06.3 HYPOTHYROIDISM DUE TO HASHIMOTO'S THYROIDITIS: ICD-10-CM

## 2023-10-13 DIAGNOSIS — F90.2 ADHD (ATTENTION DEFICIT HYPERACTIVITY DISORDER), COMBINED TYPE: ICD-10-CM

## 2023-10-13 DIAGNOSIS — J45.40 MODERATE PERSISTENT ASTHMA WITHOUT COMPLICATION: ICD-10-CM

## 2023-10-13 PROBLEM — Z30.41 ENCOUNTER FOR SURVEILLANCE OF CONTRACEPTIVE PILLS: Status: RESOLVED | Noted: 2022-11-29 | Resolved: 2023-10-13

## 2023-10-13 PROBLEM — D80.1 COMMON VARIABLE AGAMMAGLOBULINEMIA (HCC): Status: RESOLVED | Noted: 2017-03-07 | Resolved: 2023-10-13

## 2023-10-13 PROBLEM — N92.6 IRREGULAR MENSES: Status: RESOLVED | Noted: 2022-08-26 | Resolved: 2023-10-13

## 2023-10-13 PROBLEM — R00.2 PALPITATIONS: Status: RESOLVED | Noted: 2022-12-20 | Resolved: 2023-10-13

## 2023-10-13 PROCEDURE — 90686 IIV4 VACC NO PRSV 0.5 ML IM: CPT | Performed by: FAMILY MEDICINE

## 2023-10-13 PROCEDURE — 90471 IMMUNIZATION ADMIN: CPT | Performed by: FAMILY MEDICINE

## 2023-10-13 PROCEDURE — 90472 IMMUNIZATION ADMIN EACH ADD: CPT | Performed by: FAMILY MEDICINE

## 2023-10-13 PROCEDURE — 90677 PCV20 VACCINE IM: CPT | Performed by: FAMILY MEDICINE

## 2023-10-13 PROCEDURE — 99395 PREV VISIT EST AGE 18-39: CPT | Performed by: FAMILY MEDICINE

## 2023-10-13 NOTE — PROGRESS NOTES
Lalo Vazquez 1995 female MRN: [id number]      ASSESSMENT/PLAN  Problem List Items Addressed This Visit        Digestive    IBS (irritable bowel syndrome)    GERD (gastroesophageal reflux disease)    Crohn's disease of colon without complication ([address])       Endocrine    Hypothyroidism due to Hashimoto's thyroiditis       Respiratory    Chronic rhinitis    Asthma, moderate persistent       Cardiovascular and Mediastinum    Chronic migraine without aura without status migrainosus, not intractable       Other    CVID (common variable immunodeficiency) (HCC)    ADHD (attention deficit hyperactivity disorder), combined type   Other Visit Diagnoses     Healthcare maintenance    -  Primary    Encounter for immunization        Relevant Orders    influenza vaccine, quadrivalent, 0.5 mL, preservative-free, for adult and pediatric patients 6 mos+ (AFLURIA, FLUARIX, FLULAVAL, FLUZONE) (Completed)    Pneumococcal Conjugate Vaccine 20-valent (Pcv20) (Completed)        F/u with specialists as scheduled. After discussion, pt is going to stop Concerta while attempting to conceive. Encouraged good hydration, regular PO intake throughout the day while adjusting hunger cues. Health Maintenance:   BP WNL   Labs UTD   Pap UTD  Vaccinations: Pneumo given, TDap UTD, Flu given, COVID completed primary   Encouraged regular physical activity, varied diet, and regular dental/eye exams         Future Appointments   Date Time Provider [address]   12/13/2023  3:00 PM Reymundo Patterson MD [facility]  SP[facility]   3/8/2024  7:50 AM Ruthie Deluna PA-C ENDO [facility]   3/13/2024  2:40 PM Vel Bucio MD GI [facility]   10/11/2024  3:00 PM Perry Gambino MD [address] [place] [address] Practice-Wom          SUBJECTIVE  CC: [facility] Care and Physical Exam      HPI:  Lalo Vazquez is a 34 y.o. female who presents to [facility]. History reviewed and updated as below. CVID/Asthma/Allergies:  Follows with Allergy, recommended genetic testing; uses [facility] for exercise    Crohn's/GERD/IBS: Follows with GI  Hypothyroidism: Follows with Endo   Migraine: Well controlled with Magnesium  ADD: Currently on Concerta, though is trying to conceive and wondering about safety. Has previously stopped, notes she has increased hunger. Labs 09/2023   A1c 5.5%  Lipids: Total 190, , HDL 73, TG 39; LFTs WNL   Cr 0.71/   CBC benign; Ferritin 17, Iron 67   D 39    Review of Systems   Constitutional:  Negative for unexpected weight change. HENT:  Negative for congestion, ear pain, rhinorrhea and sore throat. Had breakthrough symptoms when she held Xyzal for allergy testing   Eyes:  Negative for visual disturbance. Respiratory:  Positive for wheezing (with exercise). Negative for cough and shortness of breath. Cardiovascular:  Negative for chest pain, palpitations and leg swelling. Gastrointestinal:  Negative for abdominal pain, constipation and diarrhea. Blood in stool: occasional with stress. Endocrine: Negative for polyuria. Genitourinary:  Negative for dysuria and menstrual problem (heavy, but only lasting for 3 days; regular). Neurological:  Negative for dizziness and headaches. Psychiatric/Behavioral:  Negative for sleep disturbance.         Historical Information   The patient history was reviewed and updated as follows:    Past Medical History:   Diagnosis Date   • Anemia    • Anxiety    • Asthma    • Colitis, chronic, ulcerative ([address])    • Disease of thyroid gland    • Endometriosis    • GERD (gastroesophageal reflux disease)    • Hashimoto's disease    • Heartburn    • Hyperthyroidism    • Immune deficiency disorder ([address])    • Irregular menses 08/26/2022   • Migraine    • Palpitations 12/20/2022   • Pneumonia    • Rash    • Ulcerative colitis ([address])    • Urinary tract infection    • Urticaria      Past Surgical History:   Procedure Laterality Date   • ADENOIDECTOMY     • COLONOSCOPY     • COLPOSCOPY • EAR SURGERY Bilateral     Tubes   • MEDIPORT INSERTION, SINGLE      x2   • MOLE REMOVAL       Family History   Problem Relation Age of Onset   • Diabetes Mother    • Migraines Mother    • DEDE disease Mother    • Asthma Mother    • Allergies Mother    • Bipolar disorder Father    • Anxiety disorder Father    • Hypertension Father    • Allergies Father    • Hypertension Maternal Grandmother       Social History   Social History     Substance and Sexual Activity   Alcohol Use Yes    Comment: Occasional     Social History     Substance and Sexual Activity   Drug Use Never     Social History     Tobacco Use   Smoking Status Never   Smokeless Tobacco Never       Medications:     Current Outpatient Medications:   •  Adalimumab 40 MG/0.4ML PSKT, Inject 1 Dose under the skin every 7 days , Disp: , Rfl:   •  albuterol (PROVENTIL HFA,VENTOLIN HFA) 90 mcg/act inhaler, Inhale 2 puffs every 4 (four) hours as needed for wheezing, Disp: 18 g, Rfl: 3  •  budesonide-formoterol (Symbicort) 160-4.5 mcg/act inhaler, Inhale 2 puffs 2 (two) times a day Rinse mouth after use., Disp: 10.2 g, Rfl: 3  •  cholecalciferol (VITAMIN D3) 1,000 units tablet, Take 1,000 Units by mouth daily, Disp: , Rfl:   •  EPINEPHrine (EPIPEN JR) 0.15 mg/0.3 mL SOAJ, Inject 0.15 mg into a muscle once as needed for anaphylaxis, Disp: , Rfl:   •  Humira Pen 40 MG/0.4ML PNKT, , Disp: , Rfl:   •  Immune Globulin, Human, (HIZENTRA SC), Inject 1 Dose under the skin once a week, Disp: , Rfl:   •  levocetirizine (XYZAL) 5 MG tablet, Take 1 tablet (5 mg total) by mouth every evening, Disp: 90 tablet, Rfl: 2  •  magnesium oxide (MAG-OX) 400 mg, Take 1 tablet (400 mg total) by mouth 2 (two) times a day, Disp: 60 tablet, Rfl: 3  •  methylphenidate (Concerta) 27 MG ER tablet, Take 1 tablet (27 mg total) by mouth daily Max Daily Amount: 27 mg, Disp: 30 tablet, Rfl: 0  •  Multiple Vitamin (MULTIVITAMIN) tablet, Take 1 tablet by mouth daily, Disp: , Rfl:   •  omeprazole (PriLOSEC) 40 MG capsule, Take 1 capsule (40 mg total) by mouth daily, Disp: 90 capsule, Rfl: 3  •  Synthroid 100 MCG tablet, 1 tab daily, Disp: 90 tablet, Rfl: 3  •  Ubrogepant (Ubrelvy) 100 MG tablet, Take 1 tablet (100 mg total) by mouth as needed (migraine) May repeat in 2 hours if needed. Limit of 200 mg in 24 hours, Disp: 10 tablet, Rfl: 6  Allergies   Allergen Reactions   • Bee Venom Anaphylaxis     breathing  breathing     • Cinnamon - Food Allergy Anaphylaxis     Lab test 0. Per pt she has tolerated many times by accident, although has not had formal food challenge by allergy   • Albuterol Tachycardia       OBJECTIVE    Vitals:   Vitals:    10/13/23 1316   BP: 134/82   Pulse: 57   Temp: 97.7 °F (36.5 °C)   SpO2: 97%   Weight: 104 kg (228 lb 3.2 oz)           Physical Exam  Vitals and nursing note reviewed. Constitutional:       General: She is not in acute distress. Appearance: Normal appearance. HENT:      Head: Normocephalic and atraumatic. Right Ear: Tympanic membrane, ear canal and external ear normal.      Left Ear: Tympanic membrane, ear canal and external ear normal.      Nose: Nose normal.      Mouth/Throat:      Mouth: Mucous membranes are moist.      Pharynx: No oropharyngeal exudate or posterior oropharyngeal erythema. Eyes:      Conjunctiva/sclera: Conjunctivae normal.   Cardiovascular:      Rate and Rhythm: Normal rate and regular rhythm. Pulmonary:      Effort: Pulmonary effort is normal. No respiratory distress. Breath sounds: Normal breath sounds. Abdominal:      General: Bowel sounds are normal. There is no distension. Palpations: Abdomen is soft. Tenderness: There is no abdominal tenderness. Musculoskeletal:      Right lower leg: No edema. Left lower leg: No edema. Lymphadenopathy:      Cervical: No cervical adenopathy. Skin:     General: Skin is warm and dry. Neurological:      Mental Status: She is alert.       Comments: Grossly intact Psychiatric:         Mood and Affect: Mood normal.                    Melony Baez DO  [facility]   10/13/2023  1:50 PM

## 2023-10-16 LAB
LAB AP GYN PRIMARY INTERPRETATION: NORMAL
Lab: NORMAL

## 2023-10-24 DIAGNOSIS — K52.9 IBD (INFLAMMATORY BOWEL DISEASE): Primary | ICD-10-CM

## 2023-10-24 NOTE — TELEPHONE ENCOUNTER
I spoke with Mayur Farris from [employer].  No Wetzel requesting Humira weekly maintenance dose to be sent electronically to [facility] for bridge program. Confirmed with [facility] specialist.

## 2023-10-24 NOTE — TELEPHONE ENCOUNTER
----- Message from [place], [place] sent at 10/24/2023  9:44 AM EDT -----  Regarding: Alex Roles   Contact: [phone]    ----- Message -----  From: Karen Sarmiento  Sent: 10/18/2023   1:07 PM EDT  To: Juan roberts MA  Subject: Roberto Hathaway Roles                                        ----- Message -----  From: Demar Valadez  Sent: 10/18/2023   1:05 PM EDT  To: Gastro[facility]ology [facility]  Subject: Chen Rome,   I’m actually going through a different Program. The program is the bridge program. So they should be reaching out to you guys in 24/48 hours. I do have the co pay assistance card but it is already maxed out, and I can’t afford the drug.  So they suggested going into the bridge program.

## 2023-11-08 DIAGNOSIS — E03.8 HYPOTHYROIDISM DUE TO HASHIMOTO'S THYROIDITIS: Primary | ICD-10-CM

## 2023-11-08 DIAGNOSIS — E06.3 HYPOTHYROIDISM DUE TO HASHIMOTO'S THYROIDITIS: Primary | ICD-10-CM

## 2023-11-08 RX ORDER — LEVOTHYROXINE SODIUM 100 MCG
TABLET ORAL
Qty: 90 TABLET | Refills: 3 | Status: SHIPPED | OUTPATIENT
Start: 2023-11-08

## 2023-12-01 ENCOUNTER — OFFICE VISIT (OUTPATIENT)
Dept: OBGYN CLINIC | Facility: CLINIC | Age: 28
End: 2023-12-01
Payer: COMMERCIAL

## 2023-12-01 VITALS
SYSTOLIC BLOOD PRESSURE: 120 MMHG | WEIGHT: 234 LBS | HEIGHT: 66 IN | DIASTOLIC BLOOD PRESSURE: 74 MMHG | BODY MASS INDEX: 37.61 KG/M2

## 2023-12-01 DIAGNOSIS — N91.2 AMENORRHEA: Primary | ICD-10-CM

## 2023-12-01 PROCEDURE — 99214 OFFICE O/P EST MOD 30 MIN: CPT | Performed by: OBSTETRICS & GYNECOLOGY

## 2023-12-01 PROCEDURE — 76817 TRANSVAGINAL US OBSTETRIC: CPT | Performed by: OBSTETRICS & GYNECOLOGY

## 2023-12-01 NOTE — PROGRESS NOTES
Assessment/Plan:  Diagnoses and all orders for this visit:    Amenorrhea  -     AMB US OB < 14 weeks single or first gestation level 1  -     Ambulatory Referral to Maternal Fetal Medicine; Future        - Viable IUP @ 7w 3d EGA  - RAJIV 2024 by LMP  - Continue PNV  - Patient to call for concerns  - RTO 3 weeks for OB intake            Subjective:       Patient ID: Greer Rao [id number]        Greer Rao is a 34 y.o. Clarkeyla Parker presenting to the office for pregnancy confirmation. Patient's last menstrual period was 10/10/2023 (exact date). , placing her at 7w3d today with RAJIV of 2024. She is feeling well though having nausea.  Denies vomiting        OB History    Para Term  AB Living   0 0 0 0 0 0   SAB IAB Ectopic Multiple Live Births   0 0 0 0 0         The following portions of the patient's history were reviewed and updated as appropriate: allergies, current medications, past family history, past medical history, past social history, past surgical history, and problem list.    Allergies:  Bee venom, Cinnamon - food allergy, and Albuterol    Medications:    Current Outpatient Medications:     Adalimumab 40 MG/0.4ML PNKT, Inject 40 mg under the skin every 7 days Maintenance dose., Disp: 4 each, Rfl: 5    albuterol (PROVENTIL HFA,VENTOLIN HFA) 90 mcg/act inhaler, Inhale 2 puffs every 4 (four) hours as needed for wheezing, Disp: 18 g, Rfl: 3    budesonide-formoterol (Symbicort) 160-4.5 mcg/act inhaler, Inhale 2 puffs 2 (two) times a day Rinse mouth after use., Disp: 10.2 g, Rfl: 3    cholecalciferol (VITAMIN D3) 1,000 units tablet, Take 1,000 Units by mouth daily, Disp: , Rfl:     EPINEPHrine (EPIPEN JR) 0.15 mg/0.3 mL SOAJ, Inject 0.15 mg into a muscle once as needed for anaphylaxis, Disp: , Rfl:     Immune Globulin, Human, (HIZENTRA SC), Inject 1 Dose under the skin once a week, Disp: , Rfl:     levocetirizine (XYZAL) 5 MG tablet, Take 1 tablet (5 mg total) by mouth every evening, Disp: 90 tablet, Rfl: 2    magnesium oxide (MAG-OX) 400 mg, Take 1 tablet (400 mg total) by mouth 2 (two) times a day, Disp: 60 tablet, Rfl: 3    Multiple Vitamin (MULTIVITAMIN) tablet, Take 1 tablet by mouth daily, Disp: , Rfl:     omeprazole (PriLOSEC) 40 MG capsule, Take 1 capsule (40 mg total) by mouth daily, Disp: 90 capsule, Rfl: 3    Synthroid 100 MCG tablet, Take 1 tablet 5 days a week 1.5 tablets 1 day a week and 2 tablets 1 day a week, Disp: 90 tablet, Rfl: 3    Ubrogepant (Ubrelvy) 100 MG tablet, Take 1 tablet (100 mg total) by mouth as needed (migraine) May repeat in 2 hours if needed. Limit of 200 mg in 24 hours, Disp: 10 tablet, Rfl: 6    methylphenidate (Concerta) 27 MG ER tablet, Take 1 tablet (27 mg total) by mouth daily Max Daily Amount: 27 mg (Patient not taking: Reported on 12/1/2023), Disp: 30 tablet, Rfl: 0      Review of Systems:   Review of Systems       Objective:       Visit Vitals  /74 (BP Location: Left arm, Patient Position: Sitting, Cuff Size: Large)   Ht 5' 6" (1.676 m)   Wt 106 kg (234 lb)   LMP 10/10/2023 (Exact Date)   BMI 37.77 kg/m²   OB Status Unknown   Smoking Status Never   BSA 2.14 m²        GEN: The patient was alert and oriented x3, pleasant well-appearing female in no acute distress. CV: Regular rate  PULM: nonlabored respirations  MSK: Normal gait  : normal external genitalia  Skin: warm, dry  Neuro: no focal deficits  Psych: normal affect and judgement, cooperative    Ultrasound:     Viability US     Gestational sac: present               Location: uterine  Yolk sac: present  Fetal pole: present               CRL: 0.97 cm = 7w0d  Cardiac activity: present               Rate: 139 bpm     Ovaries: normal appearing bilaterally  Cul de sac: absence of free fluid  Uterus: normal in appearance           Ultrasound Probe Disinfection    A transvaginal ultrasound was performed.    Prior to use, disinfection was performed with High Level Disinfection Process (Trophon)  Probe serial number RVRSDE: [id number] was used    Gio Washburn MD  12/01/23  12:46 PM

## 2023-12-04 ENCOUNTER — TELEPHONE (OUTPATIENT)
Facility: HOSPITAL | Age: 28
End: 2023-12-04

## 2023-12-04 NOTE — TELEPHONE ENCOUNTER
Called patient to schedule MFM appointment, based on referral issued to Maternal Fetal Medicine by [facility] office. Left voicemail requesting patient to call back and schedule appointment, with office number for return call [phone].

## 2023-12-05 ENCOUNTER — TELEPHONE (OUTPATIENT)
Dept: PERINATAL CARE | Facility: OTHER | Age: 28
End: 2023-12-05

## 2023-12-05 NOTE — TELEPHONE ENCOUNTER
Called patient to schedule MFM appointment, based on referral issued to Maternal Fetal Medicine by [facility] office. Left voicemail requesting patient to call back and schedule appointment, with office number for return call [phone].

## 2023-12-08 ENCOUNTER — APPOINTMENT (OUTPATIENT)
Age: 28
End: 2023-12-08
Payer: COMMERCIAL

## 2023-12-08 LAB
T4 FREE SERPL-MCNC: 0.89 NG/DL (ref 0.61–1.12)
TSH SERPL DL<=0.05 MIU/L-ACNC: 0.74 UIU/ML (ref 0.45–4.5)

## 2023-12-08 PROCEDURE — 36415 COLL VENOUS BLD VENIPUNCTURE: CPT | Performed by: PHYSICIAN ASSISTANT

## 2023-12-08 PROCEDURE — 84439 ASSAY OF FREE THYROXINE: CPT | Performed by: PHYSICIAN ASSISTANT

## 2023-12-08 PROCEDURE — 84443 ASSAY THYROID STIM HORMONE: CPT | Performed by: PHYSICIAN ASSISTANT

## 2023-12-21 DIAGNOSIS — E03.8 HYPOTHYROIDISM DUE TO HASHIMOTO'S THYROIDITIS: ICD-10-CM

## 2023-12-21 DIAGNOSIS — E06.3 HYPOTHYROIDISM DUE TO HASHIMOTO'S THYROIDITIS: ICD-10-CM

## 2023-12-21 RX ORDER — LEVOTHYROXINE SODIUM 100 MCG
TABLET ORAL
Qty: 90 TABLET | Refills: 0 | Status: SHIPPED | OUTPATIENT
Start: 2023-12-21

## 2023-12-21 NOTE — TELEPHONE ENCOUNTER
Received [employer] request to have Ascletis sent to [facility] mail order.

## 2023-12-22 ENCOUNTER — INITIAL PRENATAL (OUTPATIENT)
Dept: OBGYN CLINIC | Facility: CLINIC | Age: 28
End: 2023-12-22

## 2023-12-22 VITALS
RESPIRATION RATE: 18 BRPM | WEIGHT: 238.8 LBS | SYSTOLIC BLOOD PRESSURE: 116 MMHG | DIASTOLIC BLOOD PRESSURE: 70 MMHG | OXYGEN SATURATION: 99 % | BODY MASS INDEX: 38.38 KG/M2 | HEIGHT: 66 IN | HEART RATE: 76 BPM

## 2023-12-22 DIAGNOSIS — Z3A.10 10 WEEKS GESTATION OF PREGNANCY: Primary | ICD-10-CM

## 2023-12-22 DIAGNOSIS — Z34.00 PRIMIGRAVIDA, ANTEPARTUM: ICD-10-CM

## 2023-12-22 PROCEDURE — OBC

## 2023-12-22 NOTE — PROGRESS NOTES
Antony presents for OB intake interview. She is a pleasant 28 year old . She has a history of Hashimoto's disease, PCOS, IBS, CVID, and Crohn's disease. She is managed medically with Synthroid (her Endocrinologist sends for labs every 3 weeks to check her TSH levels), weekly Humira injections for Crohns, and weekly IVIG for CVID. She also has a history of migraines- she stopped her migraine medication when she found out that she is pregnant. Antony offers no concerns during today's visit. Patient oriented to practice, different practice providers, different practice locations.  Reviewed expected prenatal visit schedule. Reviewed next appointment date / time with patient.     PLAN-  -Prenatal labs ordered  Did not order TSH level since Endo is checking her again in 2 weeks  -Referral given for MFM  -Reviewed Genetic Testing options  -Patient to contact us with any concerns  -RTO for OB visit (last pap 10/2023)       OB History          1    Para   0    Term   0       0    AB   0    Living   0         SAB   0    IAB   0    Ectopic   0    Multiple   0    Live Births   0           Obstetric Comments   Menarche 16               Hx of  delivery prior to 36 weeks 6 days: NO     Last Menstrual Period: Patient's last menstrual period was 10/10/2023 (exact date).  Confirmation ultrasound done on 2023: 7w3d       Estimated Date of Delivery: 2024                Signs/Symptoms of Pregnancy                            Breast tenderness:  YES              Fatigue:   YES              Cramping:   YES              Nausea:  YES  Vomiting:   YES (one time)     Pregravid BMI: Body mass index is 37.14  Discussed appropriate weight gain in pregnancy based on pre-gravid BMI.     Diabetes              Pregestational DM:  NO              hx of GDM: NO              BMI >35: YES              first degree relative with type 2 diabetes: YES              hx of PCOS: YES              current metformin use:  NO              prior hx of LGA/macrosomia: NO                   Hypertension              Hx of chronic HTN: NO              hx of gestational HTN: NO              hx of preeclampsia, eclampsia, or HELLP syndrome: NO              Family h/o preeclampsia: NO              Age 35 or older:NO              Multifetal gestation: NO  Type 1 or Type 2 DM: NO  Renal Disease: YES  Autoimmune disease (systemic lupus erythematosus, antiphospholipid antibody syndrome): YES  Nulliparity: YES  Obesity (BMI over 30): YES  More than 10 year pregnancy interval: NO  Previous IUGR, low birthweight or small for gestational age: NO        Infection Screening              Does the pt have a hx of MRSA? NO              Recent travel outside of [place]? NO       Immunizations:              Influenza vaccine given today: NO (vaccine was given 10/06/2023)              Discussed Tdap vaccine administration at 27-28 weeks   COVID vaccine discussed   RSV vaccine discussed     Interview education              [facility] Pregnancy Essentials Book reviewed and discussed                          Handouts given                          Nutrition During Pregnancy  Prenatal Genetic Screening Tests                          Immunization & Pregnancy                          Medications & Pregnancy                          Warning Signs During Pregnancy                          Baby and Me support center                          [facility] Childbirth and Parenting Classes  [facility] MYCHART                          [facility] Labs                          Ultrasounds in Pregnancy    Dental visit with last 6 months - YES  PHQ-2/9 score: 1         MyChart activated (not 13-18 years of age)?: YES

## 2024-01-03 DIAGNOSIS — K52.9 IBD (INFLAMMATORY BOWEL DISEASE): ICD-10-CM

## 2024-01-03 DIAGNOSIS — K21.9 GASTROESOPHAGEAL REFLUX DISEASE, UNSPECIFIED WHETHER ESOPHAGITIS PRESENT: ICD-10-CM

## 2024-01-03 RX ORDER — OMEPRAZOLE 40 MG/1
40 CAPSULE, DELAYED RELEASE ORAL DAILY
Qty: 90 CAPSULE | Refills: 3 | Status: SHIPPED | OUTPATIENT
Start: 2024-01-03

## 2024-01-03 NOTE — TELEPHONE ENCOUNTER
----- Message -----  From: Antony Rodriguez  Sent: 1/3/2024   1:10 PM EST  To: [facility]  Subject: Prescription                                     Can you also send a refill of omperazole to [facility] pharmacy mail order please?   Thank you,   Antony Rodriguez   to P [facility] (supporting Vel Bucio MD)         1/3/24  1:07 PM  Good afternoon,   Can you send in my prescription for humeria to [place] pharmacy? Now that it’s the new year my savings card has rest!   Thank you!   Antony

## 2024-01-12 ENCOUNTER — APPOINTMENT (OUTPATIENT)
Dept: LAB | Facility: CLINIC | Age: 29
End: 2024-01-12
Payer: COMMERCIAL

## 2024-01-12 ENCOUNTER — ROUTINE PRENATAL (OUTPATIENT)
Facility: HOSPITAL | Age: 29
End: 2024-01-12
Attending: OBSTETRICS & GYNECOLOGY
Payer: COMMERCIAL

## 2024-01-12 VITALS
WEIGHT: 240.6 LBS | SYSTOLIC BLOOD PRESSURE: 132 MMHG | BODY MASS INDEX: 38.67 KG/M2 | HEIGHT: 66 IN | DIASTOLIC BLOOD PRESSURE: 74 MMHG | OXYGEN SATURATION: 99 % | HEART RATE: 73 BPM

## 2024-01-12 DIAGNOSIS — Z3A.10 10 WEEKS GESTATION OF PREGNANCY: ICD-10-CM

## 2024-01-12 DIAGNOSIS — Z34.00 PRIMIGRAVIDA, ANTEPARTUM: ICD-10-CM

## 2024-01-12 DIAGNOSIS — Z36.9 UNSPECIFIED ANTENATAL SCREENING: ICD-10-CM

## 2024-01-12 DIAGNOSIS — E03.9 HYPOTHYROIDISM IN PREGNANCY, ANTEPARTUM, FIRST TRIMESTER: ICD-10-CM

## 2024-01-12 DIAGNOSIS — Z36.82 ENCOUNTER FOR ANTENATAL SCREENING FOR NUCHAL TRANSLUCENCY: Primary | ICD-10-CM

## 2024-01-12 DIAGNOSIS — O99.611 MATERNAL CROHN'S DISEASE AFFECTING PREGNANCY IN FIRST TRIMESTER (HCC): ICD-10-CM

## 2024-01-12 DIAGNOSIS — Z33.1 PREGNANT STATE, INCIDENTAL: ICD-10-CM

## 2024-01-12 DIAGNOSIS — O99.213 MATERNAL OBESITY, ANTEPARTUM, THIRD TRIMESTER: ICD-10-CM

## 2024-01-12 DIAGNOSIS — K50.90 MATERNAL CROHN'S DISEASE AFFECTING PREGNANCY IN FIRST TRIMESTER (HCC): ICD-10-CM

## 2024-01-12 DIAGNOSIS — O99.281 HYPOTHYROIDISM IN PREGNANCY, ANTEPARTUM, FIRST TRIMESTER: ICD-10-CM

## 2024-01-12 DIAGNOSIS — Z3A.13 13 WEEKS GESTATION OF PREGNANCY: ICD-10-CM

## 2024-01-12 LAB
ABO GROUP BLD: NORMAL
BASOPHILS # BLD AUTO: 0.04 THOUSANDS/ÂΜL (ref 0–0.1)
BASOPHILS NFR BLD AUTO: 0 % (ref 0–1)
BLD GP AB SCN SERPL QL: NEGATIVE
EOSINOPHIL # BLD AUTO: 0.05 THOUSAND/ÂΜL (ref 0–0.61)
EOSINOPHIL NFR BLD AUTO: 1 % (ref 0–6)
ERYTHROCYTE [DISTWIDTH] IN BLOOD BY AUTOMATED COUNT: 13.9 % (ref 11.6–15.1)
HCT VFR BLD AUTO: 37.7 % (ref 34.8–46.1)
HGB BLD-MCNC: 12.6 G/DL (ref 11.5–15.4)
IMM GRANULOCYTES # BLD AUTO: 0.04 THOUSAND/UL (ref 0–0.2)
IMM GRANULOCYTES NFR BLD AUTO: 0 % (ref 0–2)
LYMPHOCYTES # BLD AUTO: 2.82 THOUSANDS/ÂΜL (ref 0.6–4.47)
LYMPHOCYTES NFR BLD AUTO: 27 % (ref 14–44)
MCH RBC QN AUTO: 28.2 PG (ref 26.8–34.3)
MCHC RBC AUTO-ENTMCNC: 33.4 G/DL (ref 31.4–37.4)
MCV RBC AUTO: 84 FL (ref 82–98)
MONOCYTES # BLD AUTO: 0.88 THOUSAND/ÂΜL (ref 0.17–1.22)
MONOCYTES NFR BLD AUTO: 8 % (ref 4–12)
NEUTROPHILS # BLD AUTO: 6.83 THOUSANDS/ÂΜL (ref 1.85–7.62)
NEUTS SEG NFR BLD AUTO: 64 % (ref 43–75)
NRBC BLD AUTO-RTO: 0 /100 WBCS
PLATELET # BLD AUTO: 237 THOUSANDS/UL (ref 149–390)
PMV BLD AUTO: 11.4 FL (ref 8.9–12.7)
RBC # BLD AUTO: 4.47 MILLION/UL (ref 3.81–5.12)
RH BLD: NEGATIVE
SPECIMEN EXPIRATION DATE: NORMAL
WBC # BLD AUTO: 10.66 THOUSAND/UL (ref 4.31–10.16)

## 2024-01-12 PROCEDURE — 86762 RUBELLA ANTIBODY: CPT

## 2024-01-12 PROCEDURE — 86901 BLOOD TYPING SEROLOGIC RH(D): CPT

## 2024-01-12 PROCEDURE — 76813 OB US NUCHAL MEAS 1 GEST: CPT | Performed by: OBSTETRICS & GYNECOLOGY

## 2024-01-12 PROCEDURE — 87340 HEPATITIS B SURFACE AG IA: CPT

## 2024-01-12 PROCEDURE — 86803 HEPATITIS C AB TEST: CPT

## 2024-01-12 PROCEDURE — 86900 BLOOD TYPING SEROLOGIC ABO: CPT

## 2024-01-12 PROCEDURE — 76801 OB US < 14 WKS SINGLE FETUS: CPT | Performed by: OBSTETRICS & GYNECOLOGY

## 2024-01-12 PROCEDURE — 86780 TREPONEMA PALLIDUM: CPT

## 2024-01-12 PROCEDURE — 99244 OFF/OP CNSLTJ NEW/EST MOD 40: CPT | Performed by: OBSTETRICS & GYNECOLOGY

## 2024-01-12 PROCEDURE — 87389 HIV-1 AG W/HIV-1&-2 AB AG IA: CPT

## 2024-01-12 PROCEDURE — 86850 RBC ANTIBODY SCREEN: CPT

## 2024-01-12 PROCEDURE — 85025 COMPLETE CBC W/AUTO DIFF WBC: CPT

## 2024-01-12 PROCEDURE — 86787 VARICELLA-ZOSTER ANTIBODY: CPT

## 2024-01-12 NOTE — PROGRESS NOTES
"Patient chose to have [facility] Non-invasive Prenatal Screen with fetal sex.   Patient choose billed through insurance.   Patient assistance program (PAP) application provided to patient no.  PAP sent with specimen No.     Patient given brochure and is aware [employer] will contact patients insurance and coordinate coverage.  Patient made aware she will receive a text message and e-mail from [employer].   Patient informed text message will come from area code  \"415\".   Provided [employer] Client Services # [phone] and web site at [email or web address].    Blood collection tubes labeled with patient identifiers (name, date of birth).     Printed [employer] lab order and test kit given with FED EX packaging (Tracking # [id number]). Patient instructed to take to a [facility] outpatient lab for blood collection.  Requested patient notify MFM (via phone call or [place] message) when blood collected so office can follow up on results.     Copy of lab order scanned to Epic media.    Maternal Fetal Medicine will have results in approximately 7-10 business days and will call patient or notify via [employer].  Patient aware viewing lab result online will reveal fetal sex If ordered.    Patient verbalized understanding of all instructions and no questions at this time.           "

## 2024-01-12 NOTE — LETTER
"January 13, 2024     Cassie Alicea MD  [address]  [address]  [place] [address]    Patient: Antony Rodriguez   YOB: 1995   Date of Visit: 1/12/2024       Dear Dr. Alicea:    Thank you for referring Antony Rodriguez to me for evaluation. Below are my notes for this consultation.    If you have questions, please do not hesitate to call me. I look forward to following your patient along with you.         Sincerely,        Chepe Mcmahon MD        CC: No Recipients    Chepe Mcmahon MD  1/13/2024  9:32 AM  Sign when Signing Visit  Please refer to the [facility] ultrasound report in Ob Procedures for additional information regarding today's visit    Yoly Jarrett  1/12/2024  3:38 PM  Sign when Signing Visit  Patient chose to have [employer] Non-invasive Prenatal Screen with fetal sex.   Patient choose billed through insurance.   Patient assistance program (PAP) application provided to patient no.  PAP sent with specimen No.     Patient given brochure and is aware [employer] will contact patients insurance and coordinate coverage.  Patient made aware she will receive a text message and e-mail from [employer].   Patient informed text message will come from area code  \"415\".   Provided [employer] Client Services # [phone] and web site at [email or web address].    Blood collection tubes labeled with patient identifiers (name, date of birth).     Printed [employer] (app) lab order and test kit given with FED EX packaging (Tracking # [id number]). Patient instructed to take to a [facility] outpatient lab for blood collection.  Requested patient notify [facility] (via phone call or [employer] message) when blood collected so office can follow up on results.     Copy of lab order scanned to Epic media.    Maternal Fetal Medicine will have results in approximately 7-10 business days and will call patient or notify via [employer]View.  Patient aware viewing lab result online will reveal fetal sex If ordered.    Patient verbalized " understanding of all instructions and no questions at this time.

## 2024-01-13 LAB
HBV SURFACE AG SER QL: NORMAL
HCV AB SER QL: NORMAL
HIV 1+2 AB+HIV1 P24 AG SERPL QL IA: NORMAL
HIV 2 AB SERPL QL IA: NORMAL
HIV1 AB SERPL QL IA: NORMAL
HIV1 P24 AG SERPL QL IA: NORMAL
RUBV IGG SERPL IA-ACNC: 35.6 IU/ML
TREPONEMA PALLIDUM IGG+IGM AB [PRESENCE] IN SERUM OR PLASMA BY IMMUNOASSAY: NORMAL
VZV IGG SER QL IA: NORMAL

## 2024-01-13 RX ORDER — ASPIRIN 81 MG/1
162 TABLET, CHEWABLE ORAL
Qty: 180 TABLET | Refills: 1 | Status: SHIPPED | OUTPATIENT
Start: 2024-01-13 | End: 2024-04-12

## 2024-01-13 NOTE — PROGRESS NOTES
Please refer to the [facility] ultrasound report in Ob Procedures for additional information regarding today's visit

## 2024-01-15 LAB — MISCELLANEOUS LAB TEST RESULT: NORMAL

## 2024-01-16 ENCOUNTER — TELEPHONE (OUTPATIENT)
Dept: ENDOCRINOLOGY | Facility: HOSPITAL | Age: 29
End: 2024-01-16

## 2024-01-17 ENCOUNTER — ROUTINE PRENATAL (OUTPATIENT)
Dept: OBGYN CLINIC | Facility: CLINIC | Age: 29
End: 2024-01-17

## 2024-01-17 VITALS — BODY MASS INDEX: 39.06 KG/M2 | DIASTOLIC BLOOD PRESSURE: 74 MMHG | SYSTOLIC BLOOD PRESSURE: 116 MMHG | WEIGHT: 242 LBS

## 2024-01-17 DIAGNOSIS — O26.892 RH NEGATIVE STATE IN ANTEPARTUM PERIOD, SECOND TRIMESTER: Primary | ICD-10-CM

## 2024-01-17 DIAGNOSIS — Z67.91 RH NEGATIVE STATE IN ANTEPARTUM PERIOD, SECOND TRIMESTER: Primary | ICD-10-CM

## 2024-01-17 DIAGNOSIS — E03.9 HYPOTHYROID IN PREGNANCY, ANTEPARTUM, SECOND TRIMESTER: ICD-10-CM

## 2024-01-17 DIAGNOSIS — O99.282 HYPOTHYROID IN PREGNANCY, ANTEPARTUM, SECOND TRIMESTER: ICD-10-CM

## 2024-01-17 DIAGNOSIS — Z34.02 PRIMIGRAVIDA, SECOND TRIMESTER: ICD-10-CM

## 2024-01-17 DIAGNOSIS — Z3A.14 14 WEEKS GESTATION OF PREGNANCY: ICD-10-CM

## 2024-01-17 PROCEDURE — 87591 N.GONORRHOEAE DNA AMP PROB: CPT

## 2024-01-17 PROCEDURE — 87491 CHLMYD TRACH DNA AMP PROBE: CPT

## 2024-01-17 PROCEDURE — PNV

## 2024-01-17 NOTE — PROGRESS NOTES
Pt is here for her 14w prenatal. Gc urine due. Pt denies any leakage,bleeding,pressure, and contractions.

## 2024-01-17 NOTE — PROGRESS NOTES
Patient is a 27 YO  female presenting to the office at 14w1d for routine OB care.   Patient is feeling well today.   Has hypothyroidism, on Synthroid 1 tab 5 days a week and 2 tabs 1 day a week.  Fetal heart rate: 145  BP: 116/74  TWlb  Fetal Movement: not yet    Cramping: no  Bleeding: no  LOF: no  NT/13 week scan scheduled: yes, completed 2024  Anatomy scan scheduled: yes, scheduled for 3/8/2024  AFP ordered if indicated: yes  Prenatal labs complete (including Heb B, HIV): yes; date completed 24  Pap collected: no, not due, last completed 10/6/2023  GC collected:yes  OK to transfuse and code  Oriented to practice/delivery location.   RTO 4 weeks

## 2024-01-18 LAB
C TRACH DNA SPEC QL NAA+PROBE: NEGATIVE
N GONORRHOEA DNA SPEC QL NAA+PROBE: NEGATIVE

## 2024-01-26 ENCOUNTER — APPOINTMENT (OUTPATIENT)
Age: 29
End: 2024-01-26
Payer: COMMERCIAL

## 2024-01-26 DIAGNOSIS — Z34.02 PRIMIGRAVIDA, SECOND TRIMESTER: ICD-10-CM

## 2024-01-26 DIAGNOSIS — Z67.91 RH NEGATIVE STATE IN ANTEPARTUM PERIOD, SECOND TRIMESTER: ICD-10-CM

## 2024-01-26 DIAGNOSIS — O26.892 RH NEGATIVE STATE IN ANTEPARTUM PERIOD, SECOND TRIMESTER: ICD-10-CM

## 2024-01-26 LAB — GLUCOSE 1H P 50 G GLC PO SERPL-MCNC: 113 MG/DL (ref 40–134)

## 2024-01-26 PROCEDURE — 36415 COLL VENOUS BLD VENIPUNCTURE: CPT

## 2024-01-26 PROCEDURE — 82950 GLUCOSE TEST: CPT

## 2024-02-14 ENCOUNTER — ROUTINE PRENATAL (OUTPATIENT)
Dept: OBGYN CLINIC | Facility: CLINIC | Age: 29
End: 2024-02-14

## 2024-02-14 ENCOUNTER — APPOINTMENT (OUTPATIENT)
Dept: LAB | Facility: AMBULARY SURGERY CENTER | Age: 29
End: 2024-02-14
Payer: COMMERCIAL

## 2024-02-14 VITALS — SYSTOLIC BLOOD PRESSURE: 116 MMHG | BODY MASS INDEX: 39.71 KG/M2 | DIASTOLIC BLOOD PRESSURE: 70 MMHG | WEIGHT: 246 LBS

## 2024-02-14 DIAGNOSIS — O26.892 RH NEGATIVE STATE IN ANTEPARTUM PERIOD, SECOND TRIMESTER: ICD-10-CM

## 2024-02-14 DIAGNOSIS — Z67.91 RH NEGATIVE STATE IN ANTEPARTUM PERIOD, SECOND TRIMESTER: Primary | ICD-10-CM

## 2024-02-14 DIAGNOSIS — O26.892 BACK PAIN DURING PREGNANCY IN SECOND TRIMESTER: ICD-10-CM

## 2024-02-14 DIAGNOSIS — M54.9 BACK PAIN DURING PREGNANCY IN SECOND TRIMESTER: ICD-10-CM

## 2024-02-14 DIAGNOSIS — Z67.91 RH NEGATIVE STATE IN ANTEPARTUM PERIOD, SECOND TRIMESTER: ICD-10-CM

## 2024-02-14 DIAGNOSIS — O99.282 HYPOTHYROID IN PREGNANCY, ANTEPARTUM, SECOND TRIMESTER: ICD-10-CM

## 2024-02-14 DIAGNOSIS — E03.9 HYPOTHYROID IN PREGNANCY, ANTEPARTUM, SECOND TRIMESTER: ICD-10-CM

## 2024-02-14 DIAGNOSIS — Z3A.18 18 WEEKS GESTATION OF PREGNANCY: ICD-10-CM

## 2024-02-14 DIAGNOSIS — E03.8 HYPOTHYROIDISM DUE TO HASHIMOTO'S THYROIDITIS: ICD-10-CM

## 2024-02-14 DIAGNOSIS — Z3A.14 14 WEEKS GESTATION OF PREGNANCY: ICD-10-CM

## 2024-02-14 DIAGNOSIS — O26.892 RH NEGATIVE STATE IN ANTEPARTUM PERIOD, SECOND TRIMESTER: Primary | ICD-10-CM

## 2024-02-14 DIAGNOSIS — Z34.02 PRIMIGRAVIDA, SECOND TRIMESTER: ICD-10-CM

## 2024-02-14 DIAGNOSIS — E06.3 HYPOTHYROIDISM DUE TO HASHIMOTO'S THYROIDITIS: ICD-10-CM

## 2024-02-14 PROCEDURE — 36415 COLL VENOUS BLD VENIPUNCTURE: CPT

## 2024-02-14 PROCEDURE — PNV

## 2024-02-14 PROCEDURE — 82105 ALPHA-FETOPROTEIN SERUM: CPT

## 2024-02-14 NOTE — PROGRESS NOTES
Patient is a 29 YO  female presenting to the office at 18w1d for routine OB care.   Patient is feeling well today. Planning a trip in March-April, unsure of location. Likely to take a plane, discussed precautions.   Has hypothyroidism, on Synthroid 1 tab 5 days a week and 2 tabs 1 day a week.   Early 1-hour glucose test was normal at 113.  AFP previously ordered, not yet completed.   Fetal heart rate: 140  BP: 116/70  TWlb  Fetal Movement: yes, flutters  LOF: no  VB: no  CTX: no  Reviewed precautions  Call for concerns  RTO 4 weeks

## 2024-02-14 NOTE — PROGRESS NOTES
Patient would like to discuss travel restrictions during pregnancy-planning a trip in March/April timeframe.

## 2024-02-15 ENCOUNTER — TELEPHONE (OUTPATIENT)
Dept: ENDOCRINOLOGY | Facility: HOSPITAL | Age: 29
End: 2024-02-15

## 2024-02-15 NOTE — TELEPHONE ENCOUNTER
----- Message from Hoang Kidd PA-C sent at 2/15/2024  9:10 AM EST -----  Thyroid lab work came back normal.  Continue with same dose of Synthroid at this time.  Make sure to get lab work again in 4 weeks.

## 2024-02-17 LAB
2ND TRIMESTER 4 SCREEN SERPL-IMP: NORMAL
AFP ADJ MOM SERPL: 0.89
AFP INTERP AMN-IMP: NORMAL
AFP INTERP SERPL-IMP: NORMAL
AFP INTERP SERPL-IMP: NORMAL
AFP SERPL-MCNC: 29.8 NG/ML
AGE AT DELIVERY: 26.7 YR
GA METHOD: NORMAL
GA: 18.1 WEEKS
IDDM PATIENT QL: NO
MULTIPLE PREGNANCY: NO
NEURAL TUBE DEFECT RISK FETUS: NORMAL %

## 2024-03-08 ENCOUNTER — ROUTINE PRENATAL (OUTPATIENT)
Facility: HOSPITAL | Age: 29
End: 2024-03-08
Payer: COMMERCIAL

## 2024-03-08 VITALS
HEIGHT: 66 IN | SYSTOLIC BLOOD PRESSURE: 118 MMHG | HEART RATE: 90 BPM | DIASTOLIC BLOOD PRESSURE: 72 MMHG | BODY MASS INDEX: 41.08 KG/M2 | WEIGHT: 255.6 LBS

## 2024-03-08 DIAGNOSIS — Z36.3 ENCOUNTER FOR ANTENATAL SCREENING FOR MALFORMATION: ICD-10-CM

## 2024-03-08 DIAGNOSIS — Z36.86 ENCOUNTER FOR ANTENATAL SCREENING FOR CERVICAL LENGTH: ICD-10-CM

## 2024-03-08 DIAGNOSIS — K50.10 CROHN'S DISEASE OF COLON WITHOUT COMPLICATION (HCC): Primary | ICD-10-CM

## 2024-03-08 DIAGNOSIS — Z3A.21 21 WEEKS GESTATION OF PREGNANCY: ICD-10-CM

## 2024-03-08 DIAGNOSIS — O44.40 LOW LYING PLACENTA WITHOUT HEMORRHAGE, ANTEPARTUM: ICD-10-CM

## 2024-03-08 DIAGNOSIS — O99.212 OBESITY AFFECTING PREGNANCY IN SECOND TRIMESTER, UNSPECIFIED OBESITY TYPE: ICD-10-CM

## 2024-03-08 PROBLEM — O44.02 PLACENTA PREVIA IN SECOND TRIMESTER: Status: ACTIVE | Noted: 2024-03-08

## 2024-03-08 PROCEDURE — 99213 OFFICE O/P EST LOW 20 MIN: CPT | Performed by: OBSTETRICS & GYNECOLOGY

## 2024-03-08 PROCEDURE — 76817 TRANSVAGINAL US OBSTETRIC: CPT | Performed by: OBSTETRICS & GYNECOLOGY

## 2024-03-08 PROCEDURE — 76811 OB US DETAILED SNGL FETUS: CPT | Performed by: OBSTETRICS & GYNECOLOGY

## 2024-03-09 PROBLEM — O44.40 LOW LYING PLACENTA WITHOUT HEMORRHAGE, ANTEPARTUM: Status: ACTIVE | Noted: 2024-03-09

## 2024-03-09 NOTE — PROGRESS NOTES
"[facility]: Antony Rodriguez was seen today for anatomic survey and cervical length screening ultrasound.  See ultrasound report under \"OB Procedures\" tab.   The time spent on this established patient on the encounter date included 8 minutes previsit service time reviewing records and precharting, 10 minutes face-to-face service time counseling regarding results and coordinating care, and  5 minutes charting, totalling 23 minutes.  Please don't hesitate to contact our office with any concerns or questions.  -Bria Cano MD    "

## 2024-03-11 ENCOUNTER — LAB (OUTPATIENT)
Dept: LAB | Facility: CLINIC | Age: 29
End: 2024-03-11
Payer: COMMERCIAL

## 2024-03-11 DIAGNOSIS — D83.9 CVID (COMMON VARIABLE IMMUNODEFICIENCY) (HCC): ICD-10-CM

## 2024-03-11 LAB
ALBUMIN SERPL BCP-MCNC: 3.6 G/DL (ref 3.5–5)
ALP SERPL-CCNC: 86 U/L (ref 34–104)
ALT SERPL W P-5'-P-CCNC: 14 U/L (ref 7–52)
ANION GAP SERPL CALCULATED.3IONS-SCNC: 10 MMOL/L
AST SERPL W P-5'-P-CCNC: 20 U/L (ref 13–39)
BASOPHILS # BLD AUTO: 0.04 THOUSANDS/ÂΜL (ref 0–0.1)
BASOPHILS NFR BLD AUTO: 0 % (ref 0–1)
BILIRUB SERPL-MCNC: 0.35 MG/DL (ref 0.2–1)
BUN SERPL-MCNC: 6 MG/DL (ref 5–25)
CALCIUM SERPL-MCNC: 8.2 MG/DL (ref 8.4–10.2)
CHLORIDE SERPL-SCNC: 103 MMOL/L (ref 96–108)
CO2 SERPL-SCNC: 23 MMOL/L (ref 21–32)
CREAT SERPL-MCNC: 0.56 MG/DL (ref 0.6–1.3)
EOSINOPHIL # BLD AUTO: 0.03 THOUSAND/ÂΜL (ref 0–0.61)
EOSINOPHIL NFR BLD AUTO: 0 % (ref 0–6)
ERYTHROCYTE [DISTWIDTH] IN BLOOD BY AUTOMATED COUNT: 14.6 % (ref 11.6–15.1)
GFR SERPL CREATININE-BSD FRML MDRD: 129 ML/MIN/1.73SQ M
GLUCOSE SERPL-MCNC: 84 MG/DL (ref 65–140)
HCT VFR BLD AUTO: 35 % (ref 34.8–46.1)
HGB BLD-MCNC: 11.5 G/DL (ref 11.5–15.4)
IGG SERPL-MCNC: 971 MG/DL (ref 635–1741)
IMM GRANULOCYTES # BLD AUTO: 0.08 THOUSAND/UL (ref 0–0.2)
IMM GRANULOCYTES NFR BLD AUTO: 1 % (ref 0–2)
LYMPHOCYTES # BLD AUTO: 2.36 THOUSANDS/ÂΜL (ref 0.6–4.47)
LYMPHOCYTES NFR BLD AUTO: 19 % (ref 14–44)
MCH RBC QN AUTO: 28 PG (ref 26.8–34.3)
MCHC RBC AUTO-ENTMCNC: 32.9 G/DL (ref 31.4–37.4)
MCV RBC AUTO: 85 FL (ref 82–98)
MONOCYTES # BLD AUTO: 0.97 THOUSAND/ÂΜL (ref 0.17–1.22)
MONOCYTES NFR BLD AUTO: 8 % (ref 4–12)
NEUTROPHILS # BLD AUTO: 9.22 THOUSANDS/ÂΜL (ref 1.85–7.62)
NEUTS SEG NFR BLD AUTO: 72 % (ref 43–75)
NRBC BLD AUTO-RTO: 0 /100 WBCS
PLATELET # BLD AUTO: 257 THOUSANDS/UL (ref 149–390)
PMV BLD AUTO: 12.1 FL (ref 8.9–12.7)
POTASSIUM SERPL-SCNC: 4 MMOL/L (ref 3.5–5.3)
PROT SERPL-MCNC: 6.8 G/DL (ref 6.4–8.4)
RBC # BLD AUTO: 4.1 MILLION/UL (ref 3.81–5.12)
SODIUM SERPL-SCNC: 136 MMOL/L (ref 135–147)
WBC # BLD AUTO: 12.7 THOUSAND/UL (ref 4.31–10.16)

## 2024-03-11 PROCEDURE — 82784 ASSAY IGA/IGD/IGG/IGM EACH: CPT

## 2024-03-11 PROCEDURE — 85025 COMPLETE CBC W/AUTO DIFF WBC: CPT

## 2024-03-11 PROCEDURE — 36415 COLL VENOUS BLD VENIPUNCTURE: CPT

## 2024-03-11 PROCEDURE — 80053 COMPREHEN METABOLIC PANEL: CPT

## 2024-03-13 ENCOUNTER — OFFICE VISIT (OUTPATIENT)
Dept: GASTROENTEROLOGY | Facility: CLINIC | Age: 29
End: 2024-03-13
Payer: COMMERCIAL

## 2024-03-13 VITALS
HEIGHT: 66 IN | WEIGHT: 257 LBS | DIASTOLIC BLOOD PRESSURE: 78 MMHG | SYSTOLIC BLOOD PRESSURE: 123 MMHG | TEMPERATURE: 98.4 F | OXYGEN SATURATION: 99 % | HEART RATE: 97 BPM | BODY MASS INDEX: 41.3 KG/M2

## 2024-03-13 DIAGNOSIS — D83.9 CVID (COMMON VARIABLE IMMUNODEFICIENCY) (HCC): ICD-10-CM

## 2024-03-13 DIAGNOSIS — Z3A.26 26 WEEKS GESTATION OF PREGNANCY: ICD-10-CM

## 2024-03-13 DIAGNOSIS — K52.9 IBD (INFLAMMATORY BOWEL DISEASE): Primary | ICD-10-CM

## 2024-03-13 DIAGNOSIS — D84.821 IMMUNOSUPPRESSION DUE TO DRUG THERAPY: ICD-10-CM

## 2024-03-13 DIAGNOSIS — Z79.899 IMMUNOSUPPRESSION DUE TO DRUG THERAPY: ICD-10-CM

## 2024-03-13 PROCEDURE — 99215 OFFICE O/P EST HI 40 MIN: CPT | Performed by: INTERNAL MEDICINE

## 2024-03-13 NOTE — PROGRESS NOTES
Gastroenterology Outpatient Follow-up - Crohn's Disease  Antony Rodriguez 28 y.o. female MRN: [id number]  Encounter: [id number]    Antony Rodriguez is a 28 y.o. female with Ulcerative colitis.    Symptom onset:  2006  Diagnosis:  ulcerative colitis  Year of diagnosis:  2018    IBD Summary: Antony Rodriguez has C VID and ulcerative pancolitis.  She was a nonresponder to mesalamine and 6-MP and is in clinical and endoscopic remission on weekly adalimumab.    Ulcerative Colitis Summary  Macroscopic extent of diseases: pancolitis  Microscopic extent of diseases:  pancolitis  Has the patient ever been hospitalized for severe disease: No        Surgical History  Number of IBD surgeries: 0      First IBD surgery:    Most Recent IBD surgery:    Esophageal:  0    Gastroduodenal:  0    Small bowel resection(s):  0    Ileocolonic resection(s): 0      Colonic resection(s):  0    Ileostomy or colostomy:  no previous ostomy    Complete colectomy:  No         Medications     Year last used Reason for discontinuation   Corticosteroids prior   2019       Thiopurines prior   2018 OK     Methotrexate   never         Infliximab   never         Adalimumab prior     CR Required dose escalation to weekly.   Certolizumab   never         Golimumab   never         Natalizumab   never         Mesalamine prior     AE Worsening diarrhea.   Sulfasalzine   never         Vedolizumab   never         Ustekinumab   never         Tofacitinib   never         Other biologic   never             Extraintestinal Manifestations    IBD-associated arthropathy No    Uveitis No    Oral aphthous ulcers No   Erythema nodosum No    Pyoderma gangrenosum No    Primary sclerosing cholangitis No    Thrombotic complications No          Cancer / Dysplasia History  History of IBD-associated dysplasia: none    Date of diagnosis (Year):     History of colorectal cancer: No   History of cervical dysplasia: No   History of skin cancer: none         Laboratory Data   Most recent  (date) Result   PPD   unknown   Quanitferon gold 5/16/2023 negative   TPMT 9/22/2018 low   Hepatitis A   unknown   HBsAb 9/22/2020 positive   HBcAb 9/22/2020 negative   HBsAg 9/14/2023 negative   HCV Ab   unknown       Imaging / Diagnostic Procedures   Most recent (date) Findings   Colonoscopy or sigmoidoscopy #1 4/1/2022            Ulcers/Erosions  no erosions           Strictures  none           Stricture Severity  N/A           Endoscopic Score Quiros Score:  0 Rutgeert's:  N/A            Findings  Internal hemorrhoids were found. The hemorrhoids were small.  - There is no visible evidence of active colitis seen.  - The colonoscope was advanced to the cecum and then advanced 5 cm into the terminal ileum which was normal.  - Narrow band imaging was performed throughout the colon.  - Surveillace biopsies were otained throughout the colon.           Pathology  A. Small intestine, duodenum, biopsy:  - Focal chronic duodenitis.  B. Stomach, polyp, polypectomy:  - Fundic gland polyp.  C. Esophagus, gastroesophageal junction, biopsy:  - Cardio-oxyntic mucosa with mild chronic active inflammation, negative for intestinal metaplasia and dysplasia.  D. Colon, cecum, biopsy:  - Colonic mucosa with no specific pathologic change.  E. Colon, ascending, biopsy:  - Colonic mucosa with no specific pathologic change.  F. Colon, proximal transverse, biopsy:  - Colonic mucosa with no specific pathologic change.  G. Colon, distal transverse, biopsy:  - Colonic mucosa with no specific pathologic change.  H. Colon, @ 50 cm, biopsy:  - Colonic mucosa with no specific pathologic change.  I. Colon, @ 40 cm, biopsy:  - Colonic mucosa with no specific pathologic change.  J. Colon, @ 30cm, biopsy:  - Colonic mucosa with no specific pathologic change.  K. Colon, @ 20 cm, biopsy:  - Colonic mucosa with no specific pathologic change.  L. Rectum, biopsy:  - Colonic mucosa with no specific pathologic change.   Colonoscopy or sigmoidoscopy #2               Ulcers/Erosions                 Strictures                 Stricture Severity              Endoscopic Score Quiros Score:    Rugeert's:              Findings              Pathology      EGD 4/1/2022 Irregular Z-line, 1 cm hiatal hernia, 3 mm gastric polyp removed.   Small bowel follow-through       CT enterography       CT without enterography       MRI enterography       Capsule study       DEXA scan   Lowest Z-score:      CXR (for TB)           HPI:   Interval History:  3/13/2024 Follow up  Antony is doing well.  She is now 6 months pregnant.  She has stayed on adalimumab, weekly.  She feels constipated with 5 BMs per week.  Stools are hard and she is straining.  She was told not to take MiraLAX while pregnant.  Instead, she has been taking Metamucil, gummy bears, gummy bars, and vegetables.  She is drinking 90 fl oz per day.  Some bleeding this past week while straining.  Continues to get IVIG and the dose was increased based on low immunoglobulin levels.  Recent labs from March 11 reviewed.  Mild leukocytosis with WBC 12.7, hemoglobin 11.5, MCV 85, LFTs and kidney function normal.  Free T4 a little low.    9/17/2023 Initial visit  Antony Rodriguez is establishing care with me for ulcerative pan-colitis (initially diagnosed as Crohn's disease).  She also has CVID and receives IVIG.  She was previously seeing Dr. Mcnulty at [place].    She reports history of chronic constipation for most of her life.  Her first colonoscopy was in 2006 (11 years old) and the mucosa appeared normal but biopsies from the cecum showed nonspecific chronic inflammation.  In 2018, while in college, she developed bloody diarrhea, abdominal pain, and weight loss, and had another colonoscopy which found acute inflammation in the cecum and rectum, and she was diagnosed with Crohn's colitis.  She was initially treated with mesalamine (Lialda) but this caused her diarrhea to worsen.  She then establish care with Dr. John at [place] and  was started on 6-MP with partial response (improvement in pain but ongoing bleeding and calprotectin remained elevated).  In 2019, she started adalimumab after flexible sigmoidoscopy showed ongoing active disease despite 6-MP dose increased to 75 mg.  Prior to adalimumab, she was on a prednisone taper.    Adalimumab was started in May 2019 and was escalated to 40 mg every 7 days and September 2020 based on drug level and ongoing clinical symptoms.  She felt improved with higher dose of adalimumab.  She is also on omeprazole for reflux and upper abdominal symptoms, and has history of treated H. pylori infection. Her last colonoscopy was in April 2022 and was normal.  She feels that she is in clinical remission.  Her last Pap smear was a year ago and she has another appointment scheduled for October.  She has not seen Dermatology.  She is up-to-date with COVID and flu vaccinations.  She has also had pneumococcal vaccines.    Antony reports the following symptoms over the last 7 days:    Stool Frequency normal   Average stools per day: 1   Average liquid stools per day: 0   Consistency of bowel: formed   Awakening from sleep to move bowels? No   Urgency no fecal urgency   Visible blood in stool? visible blood in stool less than half the time   Abdominal pain? none   General Wellbeing? generally well     Antony also reports the following symptoms in the last month:    Leakage of stool while sleeping? No   Leakage of stool while awake? No       REVIEW OF SYSTEMS:  During the last 7 days, Antony experienced the following symptoms:  Unintentional Weight Loss (in the last month) No   Fever No   Eye irritation No   Mouth sores No   Sore throat No   Chest pain No   Shortness of breath No   Numbness or tingling in hands or feet No   Skin rash No   Pain or swelling in joints No   Bruising or bleeding No   Felt depressed or blue No   Fatigue Yes   Dysuria No   Please see HPI for additional pertinent review of systems; otherwise  "remainder of ROS was unremarkable    MEDICATIONS:    Current Outpatient Medications:     Adalimumab 40 MG/0.4ML PNKT    albuterol (PROVENTIL HFA,VENTOLIN HFA) 90 mcg/act inhaler    aspirin 81 mg chewable tablet    budesonide (Pulmicort Flexhaler) 90 MCG/ACT inhaler    cholecalciferol (VITAMIN D3) 1,000 units tablet    Immune Globulin, Human, (Hizentra) 10 GM/50ML SOLN    levocetirizine (XYZAL) 5 MG tablet    Multiple Vitamin (MULTIVITAMIN) tablet    omeprazole (PriLOSEC) 40 MG capsule    Synthroid 100 MCG tablet    EPINEPHrine (EPIPEN JR) 0.15 mg/0.3 mL SOAJ    Immune Globulin, Human, (HIZENTRA SC)    Ubrogepant (Ubrelvy) 100 MG tablet    ALLERGIES:  Allergies   Allergen Reactions    Bee Venom Anaphylaxis     breathing  breathing      Cinnamon - Food Allergy Anaphylaxis     Lab test 0.  Per pt she has tolerated many times by accident, although has not had formal food challenge by allergy    Albuterol Tachycardia       OBJECTIVE:  /78 (BP Location: Left arm, Patient Position: Sitting, Cuff Size: Large)   Pulse 97   Temp 98.4 °F (36.9 °C) (Tympanic)   Ht 5' 6\" (1.676 m)   Wt 117 kg (257 lb)   LMP 10/10/2023 (Exact Date)   SpO2 99%   BMI 41.48 kg/m²      PHYSICAL EXAM:    General Appearance:   Alert, cooperative, no distress   HEENT:   Normocephalic, atraumatic, anicteric.     Neck:  Supple, symmetrical, trachea midline   Lungs:   Clear to auscultation bilaterally; no rales, rhonchi or wheezing; respirations unlabored    Heart::   Regular rate and rhythm; no murmur, rub, or gallop.   Abdomen:   Soft, non-distended; normal bowel sounds    Abdominal exam was notable for no tenderness with no mass.     Genitalia:   Deferred    Rectal:   Perirectal assessment was not performed.                     Extremities:  No cyanosis, clubbing or edema    Pulses:  2+ and symmetric    Skin:  No jaundice, rashes, or lesions    Lymph nodes:  No palpable cervical lymphadenopathy        Lab Results   Component Value Date    " WBC 12.70 (H) 03/11/2024    HGB 11.5 03/11/2024    HCT 35.0 03/11/2024    MCV 85 03/11/2024     03/11/2024     Lab Results   Component Value Date    SODIUM 136 03/11/2024    K 4.0 03/11/2024     03/11/2024    CO2 23 03/11/2024    AGAP 10 03/11/2024    BUN 6 03/11/2024    CREATININE 0.56 (L) 03/11/2024    GLUC 84 03/11/2024    GLUF 85 09/14/2023    CALCIUM 8.2 (L) 03/11/2024    AST 20 03/11/2024    ALT 14 03/11/2024    ALKPHOS 86 03/11/2024    TP 6.8 03/11/2024    TBILI 0.35 03/11/2024    EGFR 129 03/11/2024     Lab Results   Component Value Date    CRP 2.9 09/14/2023     Lab Results   Component Value Date    KXFCNRSS88 305 10/27/2021     Lab Results   Component Value Date    FERRITIN 17 09/14/2023       ASSESSMENT AND PLAN:    Antony has a history of macroscopic pancolitis and microscopic pancolitis  ulcerative colitis diagnosed in 2018. Current medical therapy is with adalimumab 40 mg every 2 weeks. My global assessment is that the clinical disease is currently quiescent.     The 6-point Quiros score was 1 and the 9-point Quiros score was 1.  The short CDAI was 44.      Antony has history of loving ulcerative colitis which is in clinical and endoscopic remission on weekly adalimumab.  She also has CVI D and receives IVIG.  She is currently 6 months pregnant and has remained on weekly adalimumab.  No clinical signs of active disease.  Her main GI issue is constipation.  1.  She will continue to take adalimumab 40 mg weekly.  2.  I recommend starting MiraLAX to help with constipation.  She can ease off on the fiber.  3.  She is mildly hypothyroid and this will be addressed by her PCP.  4.  I recommend staying on adalimumab throughout her pregnancy.  Data has shown that it is safe.  I do not recommend stopping therapy in the third trimester.  It is safe to breast-feed on adalimumab.  Live vaccines for the baby should be avoided for the first 6 months (rotavirus).  Return in 6 months after her  pregnancy.      Health Maintenance Recommendations:  Vaccines & Infections  COVID-19 vaccination and boosters are recommended. There is no evidence that the COVID-19 vaccine would cause an IBD flare.  Avoid live vaccines if on immunosuppressive therapy.  Yearly influenza vaccine (flu shot).  Pneumonia vaccines for patients on immunosuppression. These include Prevnar 20, followed by Pneumovax 23 at least 8 weeks later.  Shingrix vaccine (series of 2 injections) for al patients 65 and older. Patients on tofacitinib or upadacitinib should be vaccinated regardless of age.  If not immune to measles mumps or rubella, MMR vaccine is recommended. However, this is a live vaccine and should be given prior to immunosuppressive therapy.  HPV vaccination as per national guidelines.  Hepatitis A and B vaccinations if not previously vaccinated.  Testing for tuberculosis with QuantiFERON Gold blood test and/or chest xray prior to starting immunosuppressive medications, and then annually    Cancer screening  Dysplasia surveillance for colorectal cancer. Colonoscopy in all patients with extensive colitis (more than 1/3 of the colon involved) who had disease for at least 8 or more years.  Repeat colonoscopy approximately every 12-24 months.  In patients with concurrent primary sclerosing cholangitis, history of dysplasia, or family history of colon cancer, repeat colonoscopy annually.    Females: Pap smear annually for woman on immunosuppression.  Annual dermatologic/skin exam in all patients with IBD, especially those on immunosuppression with thiopurines or MYRTLE inhibitors.    Miscellaneous  DEXA scan, once off steroids for 3 months  Depression screening recommended annually  Routine dental and ophthalmology examinations    Problem List Items Addressed This Visit    None      Vel Bucio MD

## 2024-03-13 NOTE — RESULT ENCOUNTER NOTE
Calcium is low, white blood cell count and neutrophils are elevated.  Please see OB regarding these test results.  IgG is lower.  Please start increased dose of Hizentra when you get this.

## 2024-03-15 ENCOUNTER — ROUTINE PRENATAL (OUTPATIENT)
Dept: OBGYN CLINIC | Facility: CLINIC | Age: 29
End: 2024-03-15
Payer: COMMERCIAL

## 2024-03-15 ENCOUNTER — APPOINTMENT (OUTPATIENT)
Dept: LAB | Facility: AMBULARY SURGERY CENTER | Age: 29
End: 2024-03-15
Payer: COMMERCIAL

## 2024-03-15 ENCOUNTER — OFFICE VISIT (OUTPATIENT)
Dept: ENDOCRINOLOGY | Facility: HOSPITAL | Age: 29
End: 2024-03-15
Payer: COMMERCIAL

## 2024-03-15 VITALS — SYSTOLIC BLOOD PRESSURE: 122 MMHG | DIASTOLIC BLOOD PRESSURE: 76 MMHG | BODY MASS INDEX: 41.16 KG/M2 | WEIGHT: 255 LBS

## 2024-03-15 VITALS
OXYGEN SATURATION: 97 % | HEART RATE: 97 BPM | WEIGHT: 257 LBS | DIASTOLIC BLOOD PRESSURE: 78 MMHG | BODY MASS INDEX: 41.3 KG/M2 | HEIGHT: 66 IN | SYSTOLIC BLOOD PRESSURE: 120 MMHG

## 2024-03-15 DIAGNOSIS — Z3A.22 22 WEEKS GESTATION OF PREGNANCY: Primary | ICD-10-CM

## 2024-03-15 DIAGNOSIS — Z67.91 RH NEGATIVE STATE IN ANTEPARTUM PERIOD, SECOND TRIMESTER: ICD-10-CM

## 2024-03-15 DIAGNOSIS — E83.51 HYPOCALCEMIA: ICD-10-CM

## 2024-03-15 DIAGNOSIS — O26.892 RH NEGATIVE STATE IN ANTEPARTUM PERIOD, SECOND TRIMESTER: ICD-10-CM

## 2024-03-15 DIAGNOSIS — E83.51 HYPOCALCEMIA: Primary | ICD-10-CM

## 2024-03-15 DIAGNOSIS — E06.3 HYPOTHYROIDISM DUE TO HASHIMOTO'S THYROIDITIS: ICD-10-CM

## 2024-03-15 DIAGNOSIS — O99.282 HYPOTHYROID IN PREGNANCY, ANTEPARTUM, SECOND TRIMESTER: ICD-10-CM

## 2024-03-15 DIAGNOSIS — E03.9 HYPOTHYROID IN PREGNANCY, ANTEPARTUM, SECOND TRIMESTER: ICD-10-CM

## 2024-03-15 DIAGNOSIS — E03.8 HYPOTHYROIDISM DUE TO HASHIMOTO'S THYROIDITIS: ICD-10-CM

## 2024-03-15 LAB
25(OH)D3 SERPL-MCNC: 22.5 NG/ML (ref 30–100)
ALBUMIN SERPL BCP-MCNC: 3.5 G/DL (ref 3.5–5)
ALP SERPL-CCNC: 81 U/L (ref 34–104)
ALT SERPL W P-5'-P-CCNC: 16 U/L (ref 7–52)
ANION GAP SERPL CALCULATED.3IONS-SCNC: 9 MMOL/L (ref 4–13)
AST SERPL W P-5'-P-CCNC: 21 U/L (ref 13–39)
BILIRUB SERPL-MCNC: 0.29 MG/DL (ref 0.2–1)
BUN SERPL-MCNC: 11 MG/DL (ref 5–25)
CALCIUM SERPL-MCNC: 8.6 MG/DL (ref 8.4–10.2)
CHLORIDE SERPL-SCNC: 106 MMOL/L (ref 96–108)
CO2 SERPL-SCNC: 21 MMOL/L (ref 21–32)
CREAT SERPL-MCNC: 0.52 MG/DL (ref 0.6–1.3)
GFR SERPL CREATININE-BSD FRML MDRD: 132 ML/MIN/1.73SQ M
GLUCOSE SERPL-MCNC: 104 MG/DL (ref 65–140)
POTASSIUM SERPL-SCNC: 3.8 MMOL/L (ref 3.5–5.3)
PROT SERPL-MCNC: 6.6 G/DL (ref 6.4–8.4)
PTH-INTACT SERPL-MCNC: 56.2 PG/ML (ref 12–88)
SL AMB  POCT GLUCOSE, UA: NORMAL
SL AMB POCT URINE PROTEIN: NEGATIVE
SODIUM SERPL-SCNC: 136 MMOL/L (ref 135–147)

## 2024-03-15 PROCEDURE — PNV

## 2024-03-15 PROCEDURE — 99214 OFFICE O/P EST MOD 30 MIN: CPT | Performed by: PHYSICIAN ASSISTANT

## 2024-03-15 PROCEDURE — 36415 COLL VENOUS BLD VENIPUNCTURE: CPT

## 2024-03-15 PROCEDURE — 80053 COMPREHEN METABOLIC PANEL: CPT

## 2024-03-15 PROCEDURE — 82306 VITAMIN D 25 HYDROXY: CPT

## 2024-03-15 PROCEDURE — 83970 ASSAY OF PARATHORMONE: CPT

## 2024-03-15 PROCEDURE — 81002 URINALYSIS NONAUTO W/O SCOPE: CPT

## 2024-03-15 RX ORDER — LEVOTHYROXINE SODIUM 100 MCG
TABLET ORAL
Qty: 90 TABLET | Refills: 0 | Status: SHIPPED | OUTPATIENT
Start: 2024-03-15

## 2024-03-15 NOTE — PATIENT INSTRUCTIONS
Continue to get thyroid lab work at a monthly basis.     Increase Synthroid to 1 tab 5 days a week and 2 tabs 2 days a week.    Get labs to evaluate calcium.    Follow up in 3 months.

## 2024-03-15 NOTE — PROGRESS NOTES
Patient is a 29 YO  female presenting to the office at 22w3d for routine OB care.   Patient is feeling well today.   Pt has hypothyroidism, she saw endocrine today who changed levothyroxine dose. Currently taking 200mg QD on Saturday and  and 100mg QD throughout the week -.   28-week labs ordered.   Fetal heart rate: 150  BP: 122/76  TWlb  Fetal Movement: yes  LOF: no  VB: no  CTX: no  Reviewed precautions  Call for concerns  RTO 4 weeks

## 2024-03-15 NOTE — PROGRESS NOTES
Antony Rodriguez 28 y.o. female MRN: [id number]    Encounter: [id number]      Assessment/Plan     Assessment:  This is a 28 y.o.-year-old female with hypothyroidism due to Hashimoto's thyroiditis and history of hyperparathyroidism.    Plan:  1.  Hypothyroidism due to Hashimoto's thyroiditis: Thyroid lab work came back with a normal TSH, but a low free T4.  Clinically euthyroid, but will increase her Synthroid 100 mcg to 1 tablet 5 days a week and 2 tablets 2 days a week.  She will continue to get lab work every 4 weeks as she is currently pregnant.  She will contact the office if there is any change in symptoms.  Asked her to follow-up in 3 months with lab work completed prior to visit.    2.  Hypothyroidism in pregnancy: TSH is in normal range, but free T4 is a little low at this time.  Made adjustments to Synthroid as noted above.  She will repeat lab work in 4 weeks.     3.  Hypocalcemia: Recent calcium levels slightly low.  She does have a history of hyperparathyroidism, but also reviewing lab work does show occasional episodes of hypocalcemia, so this may be in normal physiological response.  This time I would like her to take calcium 500 mg twice a day to see if we can increase her calcium.  I also ordered repeat CMP, vitamin D, PTH for further evaluation.       CC: Hypothyroidism and history of hyperparathyroidism follow-up    History of Present Illness     HPI:  Antony Rodas is a 28 year old female with hypothyroidism due to Hashimoto's thyroiditis, common variable immunodeficiency, vitamin-D deficiency, colitis presents for follow-up.  She was diagnosed with hypothyroidism about 8 years ago.  She was initially on levothyroxine, but was switched to Synthroid 100 mcg daily at her visit in March 2021 as her thyroid function was normal, but continued to have fatigue symptoms.   She takes medication appropriately.  Does have a history of endometriosis.  Is currently in school to become a PTA.   Overall she is  feeling okay.  She is currently pregnant with an expected due date of July 16, 2024.  No complications with her pregnancy at this time.  No significant neck compressive symptoms but occasionally notes some discomfort.  Denies any fatigue, heat or cold intolerance, chest pain, palpitations, abdominal pain, constipation, tremors, anxiety or depression, hair loss, dry skin, insomnia. Has some diarrhea due to her UC.  There has been concerns with hyperparathyroidism in the past, but of note she is also had mild hypocalcemia from time to time.  Is currently taking a vitamin D supplement.  Is also on a prenatal vitamin.  Denies any muscle weakness, cramping, spasms.    Review of Systems   Constitutional:  Negative for activity change, appetite change, diaphoresis, fatigue and unexpected weight change.   HENT:  Negative for sore throat, trouble swallowing and voice change.    Eyes:  Negative for visual disturbance.   Respiratory:  Negative for chest tightness and shortness of breath.    Cardiovascular:  Negative for chest pain, palpitations and leg swelling.   Gastrointestinal:  Positive for diarrhea. Negative for abdominal pain and constipation.   Endocrine: Negative for cold intolerance, heat intolerance, polydipsia, polyphagia and polyuria.   Skin:  Negative for rash.   Neurological:  Negative for dizziness, tremors, light-headedness, numbness and headaches.   Hematological:  Negative for adenopathy.   Psychiatric/Behavioral:  Negative for dysphoric mood and sleep disturbance. The patient is not nervous/anxious.        Historical Information   Past Medical History:   Diagnosis Date   • Anemia    • Anxiety    • Asthma    • Colitis, chronic, ulcerative (HCC)    • Disease of thyroid gland    • Endometriosis    • GERD (gastroesophageal reflux disease)    • Hashimoto's disease    • Heartburn    • Hyperthyroidism    • Immune deficiency disorder (HCC)    • Irregular menses 08/26/2022   • Migraine    • Palpitations 12/20/2022    • Pneumonia    • Rash    • Ulcerative colitis (HCC)     crohn's disease, takes Humara weekly   • Urinary tract infection    • Urticaria    • Varicella     disease as child and vaccinated     Past Surgical History:   Procedure Laterality Date   • ADENOIDECTOMY     • COLONOSCOPY     • COLPOSCOPY     • EAR SURGERY Bilateral     Tubes   • MEDIPORT INSERTION, SINGLE      x2   • MOLE REMOVAL       Social History   Social History     Substance and Sexual Activity   Alcohol Use Yes    Comment: Occasional     Social History     Substance and Sexual Activity   Drug Use Never     Social History     Tobacco Use   Smoking Status Never   Smokeless Tobacco Never     Family History:   Family History   Problem Relation Age of Onset   • Diabetes Mother    • Migraines Mother    • DEDE disease Mother    • Asthma Mother    • Allergies Mother    • Diabetes type II Mother    • Infertility Mother         And father   • Bipolar disorder Father    • Anxiety disorder Father    • Hypertension Father    • Allergies Father    • Hypertension Maternal Grandmother        Meds/Allergies   Current Outpatient Medications   Medication Sig Dispense Refill   • Adalimumab 40 MG/0.4ML PNKT Inject 40 mg under the skin every 7 days Maintenance dose. 4 each 5   • albuterol (PROVENTIL HFA,VENTOLIN HFA) 90 mcg/act inhaler Inhale 2 puffs every 4 (four) hours as needed for wheezing 18 g 3   • aspirin 81 mg chewable tablet Chew 2 tablets (162 mg total) daily at bedtime 180 tablet 1   • budesonide (Pulmicort Flexhaler) 90 MCG/ACT inhaler Inhale 1 puff 2 (two) times a day as needed (wheezing) Rinse mouth after use. 1 each 3   • cholecalciferol (VITAMIN D3) 1,000 units tablet Take 1,000 Units by mouth daily     • Immune Globulin, Human, (Hizentra) 10 GM/50ML SOLN Inject 14 g under the skin once a week 70 mL 26   • levocetirizine (XYZAL) 5 MG tablet Take 1 tablet (5 mg total) by mouth every evening 90 tablet 3   • Multiple Vitamin (MULTIVITAMIN) tablet Take 1 tablet  "by mouth daily     • omeprazole (PriLOSEC) 40 MG capsule Take 1 capsule (40 mg total) by mouth daily 90 capsule 3   • Synthroid 100 MCG tablet Take 1 tablet 5 days a week and 2 tabs 2 days a week 90 tablet 0   • EPINEPHrine (EPIPEN JR) 0.15 mg/0.3 mL SOAJ Inject 0.15 mg into a muscle once as needed for anaphylaxis (Patient not taking: Reported on 3/13/2024)     • Immune Globulin, Human, (HIZENTRA SC) Inject 1 Dose under the skin once a week (Patient not taking: Reported on 3/13/2024)     • Ubrogepant (Ubrelvy) 100 MG tablet Take 1 tablet (100 mg total) by mouth as needed (migraine) May repeat in 2 hours if needed.  Limit of 200 mg in 24 hours (Patient not taking: Reported on 12/22/2023) 10 tablet 6     No current facility-administered medications for this visit.     Allergies   Allergen Reactions   • Bee Venom Anaphylaxis     breathing  breathing     • Cinnamon - Food Allergy Anaphylaxis     Lab test 0.  Per pt she has tolerated many times by accident, although has not had formal food challenge by allergy   • Albuterol Tachycardia       Objective   Vitals: Blood pressure 120/78, pulse 97, height 5' 6\" (1.676 m), weight 117 kg (257 lb), last menstrual period 10/10/2023, SpO2 97%.    Physical Exam  Vitals and nursing note reviewed.   Constitutional:       General: She is not in acute distress.     Appearance: Normal appearance. She is not diaphoretic.   HENT:      Head: Normocephalic and atraumatic.   Eyes:      General: No scleral icterus.     Extraocular Movements: Extraocular movements intact.      Conjunctiva/sclera: Conjunctivae normal.      Pupils: Pupils are equal, round, and reactive to light.   Neck:      Thyroid: No thyroid mass, thyromegaly or thyroid tenderness.   Cardiovascular:      Rate and Rhythm: Normal rate and regular rhythm.      Heart sounds: No murmur heard.  Pulmonary:      Effort: Pulmonary effort is normal. No respiratory distress.      Breath sounds: Normal breath sounds. No wheezing. " "  Musculoskeletal:      Cervical back: Normal range of motion.      Right lower leg: No edema.      Left lower leg: No edema.   Lymphadenopathy:      Cervical: No cervical adenopathy.   Neurological:      Mental Status: She is alert and oriented to person, place, and time. Mental status is at baseline.      Sensory: No sensory deficit.      Motor: No tremor.      Gait: Gait normal.      Deep Tendon Reflexes:      Reflex Scores:       Patellar reflexes are 2+ on the right side and 2+ on the left side.  Psychiatric:         Mood and Affect: Mood normal.         Behavior: Behavior normal.         Thought Content: Thought content normal.         The history was obtained from the review of the chart, patient.    Lab Results:   Lab Results   Component Value Date/Time    TSH 3RD GENERATON 1.432 03/11/2024 12:50 PM    TSH 3RD GENERATON 1.535 02/14/2024 04:16 PM    TSH 3RD GENERATON 0.858 01/12/2024 04:11 PM    Free T4 0.56 (L) 03/11/2024 12:50 PM    Free T4 0.73 02/14/2024 04:16 PM    Free T4 0.79 01/12/2024 04:11 PM         Portions of the record may have been created with voice recognition software. Occasional wrong word or \"sound a like\" substitutions may have occurred due to the inherent limitations of voice recognition software. Read the chart carefully and recognize, using context, where substitutions have occurred.    "

## 2024-03-18 ENCOUNTER — TELEPHONE (OUTPATIENT)
Dept: ENDOCRINOLOGY | Facility: HOSPITAL | Age: 29
End: 2024-03-18

## 2024-03-18 NOTE — TELEPHONE ENCOUNTER
----- Message from Hoang Kidd PA-C sent at 3/18/2024  9:39 AM EDT -----  Calcium levels have returned to normal range.  Parathyroid hormone levels were also normal.  Vitamin D was low which could be the cause of the lower calcium.  I would recommend starting vitamin D 2000 units daily.  We will continue to monitor.

## 2024-03-18 NOTE — TELEPHONE ENCOUNTER
I called and left a voicemail for the patient to call the office back concerning her recent test results.

## 2024-03-19 ENCOUNTER — NURSE TRIAGE (OUTPATIENT)
Age: 29
End: 2024-03-19

## 2024-03-19 NOTE — TELEPHONE ENCOUNTER
"Patient called reporting sharp pain that is at her lower abdominal area and hurts upon movement.  Patient was advised to use a pregnancy belt to help with support.  Patient also advised to go home and lay on left side and drink a couple cold glasses of water to try to get baby to move to another position.  She verbalized understanding and voiced appreciation for phone call.      Reason for Disposition   Round ligament pain (previously diagnosed by physician), questions about    Answer Assessment - Initial Assessment Questions  1. LOCATION: \"Where does it hurt?\"       Lower abdominal pain   2. RADIATION: \"Does the pain shoot anywhere else?\" (e.g., chest, back)      Belly button to lower abdomen   3. ONSET: \"When did the pain begin?\" (Minutes, hours or days ago)       Last evening   4. ONSET: \"Gradual or sudden onset?\"      When bending over and moving around   5. PATTERN: \"Does the pain come and go, or has it been constant since it started?\"       During movement, comes and goes   6. SEVERITY: \"How bad is the pain?\" \"What does it keep you from doing?\"  (e.g., Scale 1-10; mild, moderate, or severe)    - MILD (1-3): doesn't interfere with normal activities, abdomen soft and not tender to touch     - MODERATE (4-7): interferes with normal activities or awakens from sleep, tender to touch     - SEVERE (8-10): excruciating pain, doubled over, unable to do any normal activities      5/10  7. RECURRENT SYMPTOM: \"Have you ever had this type of stomach pain before?\" If Yes, ask: \"When was the last time?\" and \"What happened that time?\"       Denies   8. CAUSE: \"What do you think is causing the stomach pain?      Pregnancy   9. RELIEVING/AGGRAVATING FACTORS: \"What makes it better or worse?\" (e.g., antacids, bowel movement, movement)      Sitting down but just a dull pain   10. FETAL MOVEMENT: \"Has the baby's movement decreased or changed significantly from normal?\"        Confirms +FM   11. OTHER SYMPTOMS: \"Has there been any " "vaginal bleeding, fever, vomiting, diarrhea, or urine problems?\"        Denies   12. RAJIV: \"What date are you expecting to deliver?\"        7/16/24    Protocols used: Pregnancy - Abdominal Pain Greater Than 20 Weeks EGA-ADULT-[place]    "

## 2024-04-04 DIAGNOSIS — E03.8 HYPOTHYROIDISM DUE TO HASHIMOTO'S THYROIDITIS: ICD-10-CM

## 2024-04-04 DIAGNOSIS — K21.9 GASTROESOPHAGEAL REFLUX DISEASE, UNSPECIFIED WHETHER ESOPHAGITIS PRESENT: ICD-10-CM

## 2024-04-04 DIAGNOSIS — E06.3 HYPOTHYROIDISM DUE TO HASHIMOTO'S THYROIDITIS: ICD-10-CM

## 2024-04-04 RX ORDER — LEVOTHYROXINE SODIUM 100 MCG
TABLET ORAL
Qty: 90 TABLET | Refills: 3 | Status: SHIPPED | OUTPATIENT
Start: 2024-04-04

## 2024-04-04 RX ORDER — OMEPRAZOLE 40 MG/1
40 CAPSULE, DELAYED RELEASE ORAL DAILY
Qty: 90 CAPSULE | Refills: 1 | Status: SHIPPED | OUTPATIENT
Start: 2024-04-04

## 2024-04-11 ENCOUNTER — TELEPHONE (OUTPATIENT)
Age: 29
End: 2024-04-11

## 2024-04-11 DIAGNOSIS — K52.9 IBD (INFLAMMATORY BOWEL DISEASE): ICD-10-CM

## 2024-04-11 NOTE — TELEPHONE ENCOUNTER
Patients GI provider:  Dr. Bucio    Number to return call: [phone]    Reason for call: Pt calling stating pharmacy [facility] sent a fax request for Humira. Pt requesting a script be placed today to [facility] since she is out of her medication.     Scheduled procedure/appointment date if applicable: Apt 9/13/24

## 2024-04-12 ENCOUNTER — ROUTINE PRENATAL (OUTPATIENT)
Dept: OBGYN CLINIC | Facility: CLINIC | Age: 29
End: 2024-04-12

## 2024-04-12 VITALS — BODY MASS INDEX: 42.61 KG/M2 | WEIGHT: 264 LBS | SYSTOLIC BLOOD PRESSURE: 118 MMHG | DIASTOLIC BLOOD PRESSURE: 70 MMHG

## 2024-04-12 DIAGNOSIS — M54.9 BACK PAIN IN PREGNANCY: Primary | ICD-10-CM

## 2024-04-12 DIAGNOSIS — Z67.91 RH NEGATIVE STATE IN ANTEPARTUM PERIOD, SECOND TRIMESTER: ICD-10-CM

## 2024-04-12 DIAGNOSIS — O99.891 BACK PAIN IN PREGNANCY: Primary | ICD-10-CM

## 2024-04-12 DIAGNOSIS — E03.9 HYPOTHYROID IN PREGNANCY, ANTEPARTUM, SECOND TRIMESTER: ICD-10-CM

## 2024-04-12 DIAGNOSIS — O26.892 RH NEGATIVE STATE IN ANTEPARTUM PERIOD, SECOND TRIMESTER: ICD-10-CM

## 2024-04-12 DIAGNOSIS — Z3A.26 26 WEEKS GESTATION OF PREGNANCY: ICD-10-CM

## 2024-04-12 DIAGNOSIS — O99.282 HYPOTHYROID IN PREGNANCY, ANTEPARTUM, SECOND TRIMESTER: ICD-10-CM

## 2024-04-12 LAB
DME PARACHUTE DELIVERY DATE REQUESTED: NORMAL
DME PARACHUTE ITEM DESCRIPTION: NORMAL
DME PARACHUTE ORDER STATUS: NORMAL
DME PARACHUTE SUPPLIER NAME: NORMAL
DME PARACHUTE SUPPLIER PHONE: NORMAL

## 2024-04-12 PROCEDURE — PNV

## 2024-04-12 NOTE — PROGRESS NOTES
Routine prenatal 26 weeks. Pt is well, states there are no concerns. Denies vb, lof, and ctx.       Breast pump ordered- per pt request

## 2024-04-12 NOTE — PROGRESS NOTES
Patient is a 29 YO  female presenting to the office at 26w3d for routine OB care.   Patient is feeling well today.   Hypothyroid on Synthroid 100 mcg 5x/week and 200mcg 2x/week.   Labs previously ordered, discussed with patient today.   Fetal heart rate: 150  BP: 118/70  TWlb  Fetal Movement: yes  LOF: no  VB: no  CTX: no  Reviewed precautions  Call for concerns  RTO 4 weeks

## 2024-04-24 ENCOUNTER — LAB (OUTPATIENT)
Age: 29
End: 2024-04-24
Payer: COMMERCIAL

## 2024-04-24 DIAGNOSIS — Z3A.22 22 WEEKS GESTATION OF PREGNANCY: ICD-10-CM

## 2024-04-24 DIAGNOSIS — O99.282 HYPOTHYROID IN PREGNANCY, ANTEPARTUM, SECOND TRIMESTER: ICD-10-CM

## 2024-04-24 DIAGNOSIS — E03.9 HYPOTHYROID IN PREGNANCY, ANTEPARTUM, SECOND TRIMESTER: ICD-10-CM

## 2024-04-24 DIAGNOSIS — D83.9 CVID (COMMON VARIABLE IMMUNODEFICIENCY) (HCC): ICD-10-CM

## 2024-04-24 DIAGNOSIS — Z67.91 RH NEGATIVE STATE IN ANTEPARTUM PERIOD, SECOND TRIMESTER: ICD-10-CM

## 2024-04-24 DIAGNOSIS — O26.892 RH NEGATIVE STATE IN ANTEPARTUM PERIOD, SECOND TRIMESTER: ICD-10-CM

## 2024-04-24 LAB
ABO GROUP BLD: NORMAL
BASOPHILS # BLD AUTO: 0.02 THOUSANDS/ÂΜL (ref 0–0.1)
BASOPHILS NFR BLD AUTO: 0 % (ref 0–1)
BLD GP AB SCN SERPL QL: NEGATIVE
EOSINOPHIL # BLD AUTO: 0.03 THOUSAND/ÂΜL (ref 0–0.61)
EOSINOPHIL NFR BLD AUTO: 0 % (ref 0–6)
ERYTHROCYTE [DISTWIDTH] IN BLOOD BY AUTOMATED COUNT: 14.2 % (ref 11.6–15.1)
GLUCOSE 1H P 50 G GLC PO SERPL-MCNC: 142 MG/DL (ref 70–134)
HCT VFR BLD AUTO: 29.7 % (ref 34.8–46.1)
HGB BLD-MCNC: 9.4 G/DL (ref 11.5–15.4)
IGG SERPL-MCNC: 860 MG/DL (ref 635–1741)
IMM GRANULOCYTES # BLD AUTO: 0.1 THOUSAND/UL (ref 0–0.2)
IMM GRANULOCYTES NFR BLD AUTO: 1 % (ref 0–2)
LYMPHOCYTES # BLD AUTO: 1.81 THOUSANDS/ÂΜL (ref 0.6–4.47)
LYMPHOCYTES NFR BLD AUTO: 15 % (ref 14–44)
MCH RBC QN AUTO: 27.2 PG (ref 26.8–34.3)
MCHC RBC AUTO-ENTMCNC: 31.6 G/DL (ref 31.4–37.4)
MCV RBC AUTO: 86 FL (ref 82–98)
MONOCYTES # BLD AUTO: 0.64 THOUSAND/ÂΜL (ref 0.17–1.22)
MONOCYTES NFR BLD AUTO: 5 % (ref 4–12)
NEUTROPHILS # BLD AUTO: 9.39 THOUSANDS/ÂΜL (ref 1.85–7.62)
NEUTS SEG NFR BLD AUTO: 79 % (ref 43–75)
NRBC BLD AUTO-RTO: 0 /100 WBCS
PLATELET # BLD AUTO: 249 THOUSANDS/UL (ref 149–390)
PMV BLD AUTO: 11.4 FL (ref 8.9–12.7)
RBC # BLD AUTO: 3.45 MILLION/UL (ref 3.81–5.12)
RH BLD: NEGATIVE
SPECIMEN EXPIRATION DATE: NORMAL
WBC # BLD AUTO: 11.99 THOUSAND/UL (ref 4.31–10.16)

## 2024-04-24 PROCEDURE — 82784 ASSAY IGA/IGD/IGG/IGM EACH: CPT

## 2024-04-24 PROCEDURE — 85025 COMPLETE CBC W/AUTO DIFF WBC: CPT

## 2024-04-24 PROCEDURE — 36415 COLL VENOUS BLD VENIPUNCTURE: CPT

## 2024-04-24 PROCEDURE — 86900 BLOOD TYPING SEROLOGIC ABO: CPT

## 2024-04-24 PROCEDURE — 86850 RBC ANTIBODY SCREEN: CPT

## 2024-04-24 PROCEDURE — 86780 TREPONEMA PALLIDUM: CPT

## 2024-04-24 PROCEDURE — 82950 GLUCOSE TEST: CPT

## 2024-04-24 PROCEDURE — 86901 BLOOD TYPING SEROLOGIC RH(D): CPT

## 2024-04-24 PROCEDURE — 80053 COMPREHEN METABOLIC PANEL: CPT

## 2024-04-25 DIAGNOSIS — D50.9 IRON DEFICIENCY ANEMIA DURING PREGNANCY: ICD-10-CM

## 2024-04-25 DIAGNOSIS — R73.09 ABNORMAL GLUCOSE TOLERANCE TEST: Primary | ICD-10-CM

## 2024-04-25 DIAGNOSIS — Z3A.28 28 WEEKS GESTATION OF PREGNANCY: ICD-10-CM

## 2024-04-25 DIAGNOSIS — O99.019 IRON DEFICIENCY ANEMIA DURING PREGNANCY: ICD-10-CM

## 2024-04-25 LAB
ALBUMIN SERPL BCP-MCNC: 3.3 G/DL (ref 3.5–5)
ALP SERPL-CCNC: 91 U/L (ref 34–104)
ALT SERPL W P-5'-P-CCNC: 23 U/L (ref 7–52)
ANION GAP SERPL CALCULATED.3IONS-SCNC: 9 MMOL/L (ref 4–13)
AST SERPL W P-5'-P-CCNC: 22 U/L (ref 13–39)
BILIRUB SERPL-MCNC: 0.31 MG/DL (ref 0.2–1)
BUN SERPL-MCNC: 10 MG/DL (ref 5–25)
CALCIUM ALBUM COR SERPL-MCNC: 9 MG/DL (ref 8.3–10.1)
CALCIUM SERPL-MCNC: 8.4 MG/DL (ref 8.4–10.2)
CHLORIDE SERPL-SCNC: 103 MMOL/L (ref 96–108)
CO2 SERPL-SCNC: 22 MMOL/L (ref 21–32)
CREAT SERPL-MCNC: 0.55 MG/DL (ref 0.6–1.3)
GFR SERPL CREATININE-BSD FRML MDRD: 129 ML/MIN/1.73SQ M
GLUCOSE P FAST SERPL-MCNC: 127 MG/DL (ref 65–99)
POTASSIUM SERPL-SCNC: 3.9 MMOL/L (ref 3.5–5.3)
PROT SERPL-MCNC: 6.6 G/DL (ref 6.4–8.4)
SODIUM SERPL-SCNC: 134 MMOL/L (ref 135–147)
TREPONEMA PALLIDUM IGG+IGM AB [PRESENCE] IN SERUM OR PLASMA BY IMMUNOASSAY: NORMAL

## 2024-04-25 RX ORDER — SODIUM CHLORIDE 9 MG/ML
20 INJECTION, SOLUTION INTRAVENOUS ONCE
Status: CANCELLED | OUTPATIENT
Start: 2024-05-02

## 2024-04-25 NOTE — RESULT ENCOUNTER NOTE
CMP in acceptable limits.   IgG in acceptable limits. A little lower than early preganancy  Hgb and Hct and RBC lower.

## 2024-04-26 ENCOUNTER — ROUTINE PRENATAL (OUTPATIENT)
Dept: OBGYN CLINIC | Facility: CLINIC | Age: 29
End: 2024-04-26
Payer: COMMERCIAL

## 2024-04-26 ENCOUNTER — NURSE TRIAGE (OUTPATIENT)
Age: 29
End: 2024-04-26

## 2024-04-26 VITALS — WEIGHT: 266.4 LBS | SYSTOLIC BLOOD PRESSURE: 120 MMHG | DIASTOLIC BLOOD PRESSURE: 74 MMHG | BODY MASS INDEX: 43 KG/M2

## 2024-04-26 DIAGNOSIS — O26.893 RH NEGATIVE STATE IN ANTEPARTUM PERIOD, THIRD TRIMESTER: Primary | ICD-10-CM

## 2024-04-26 DIAGNOSIS — Z3A.28 28 WEEKS GESTATION OF PREGNANCY: ICD-10-CM

## 2024-04-26 DIAGNOSIS — Z67.91 RH NEGATIVE STATE IN ANTEPARTUM PERIOD, THIRD TRIMESTER: Primary | ICD-10-CM

## 2024-04-26 DIAGNOSIS — Z23 ENCOUNTER FOR IMMUNIZATION: ICD-10-CM

## 2024-04-26 PROCEDURE — 90471 IMMUNIZATION ADMIN: CPT | Performed by: OBSTETRICS & GYNECOLOGY

## 2024-04-26 PROCEDURE — PNV: Performed by: OBSTETRICS & GYNECOLOGY

## 2024-04-26 PROCEDURE — 90715 TDAP VACCINE 7 YRS/> IM: CPT | Performed by: OBSTETRICS & GYNECOLOGY

## 2024-04-26 PROCEDURE — 96372 THER/PROPH/DIAG INJ SC/IM: CPT | Performed by: OBSTETRICS & GYNECOLOGY

## 2024-04-26 RX ORDER — ASPIRIN 81 MG/1
162 TABLET, CHEWABLE ORAL
Qty: 180 TABLET | Refills: 1 | Status: SHIPPED | OUTPATIENT
Start: 2024-04-26 | End: 2024-07-25

## 2024-04-26 NOTE — PROGRESS NOTES
Antony Rodriguez is [id number], here for her routine ob appt; (pt is O-) red folder/Tdap/Rhogam to be given today.  Pt denies any LOF, or CTXs.  Pt did start with bright red blood on underwear this morning, and all through today, on toilet paper. Pt does not need us to order her a breast pump.  Pt also needs refill on ASA.  +FM:  yes         UA neg/neg

## 2024-04-26 NOTE — TELEPHONE ENCOUNTER
"Pt called in reporting some sharp pain in LLQ last night. This morning she noticed bright red blood in her underwear and with wiping after using the bathroom. Denies any pain today, leakage of fluid or clots. Baby has been active. States she has been mildly lightheaded but thinks that is due to her anemia and nerves with seeing vaginal bleeding. Denies recent intercourse or straining for a bowel movement.     TT sent to Dr. Alicea- Would recommend wearing a pad or panty liner to monitor bleeding. If continues to be light with spotting can wait until her appointment for evaluation. She's seeing Violette in the office today and Violette is aware of the situation. If her bleeding becomes heavier similar to that of a period, evaluation recommended on L&D    Information relayed to pt, she verbalized understanding and is thankful.   Reason for Disposition  • Patient wants to be seen     Pt has appt scheduled. Okay to be seen this afternoon per Dr. Alicea. If bleeding worsens she is to go to L&D.    Answer Assessment - Initial Assessment Questions  1. ONSET: \"When did this bleeding start?\"         today  2. DESCRIPTION: \"Describe the bleeding that you are having.\" \"How much bleeding is there?\"     - SPOTTING: spotting, or pinkish / brownish mucous discharge; does not fill panti-liner or pad     - MILD:  less than 1 pad / hour; less than patient's usual menstrual bleeding    - MODERATE: 1-2 pads / hour; 1 menstrual cup every 6 hours; small-medium blood clots (e.g., pea, grape, small coin)    - SEVERE: soaking 2 or more pads/hour for 2 or more hours; 1 menstrual cup every 2 hours; bleeding not contained by pads or continuous red blood from vagina; large blood clots (e.g., golf ball, large coin)       Mild/spotting  3. ABDOMINAL PAIN SEVERITY: If present, ask: \"How bad is it?\"  (e.g., Scale 1-10; mild, moderate, or severe)    - MILD (1-3): doesn't interfere with normal activities, abdomen soft and not tender to touch     - MODERATE (4-7): " "interferes with normal activities or awakens from sleep, tender to touch     - SEVERE (8-10): excruciating pain, doubled over, unable to do any normal activities      Denies at this time  4. PREGNANCY: \"Do you know how many weeks or months pregnant you are?\"       28w3d  5. RAJIV: \"What date are you expecting to deliver?\"      7/16/24  6. FETAL MOVEMENT: \"Has the baby's movement decreased or changed significantly from normal?\"      denies  7. HEMODYNAMIC STATUS: \"Are you weak or feeling lightheaded?\" If Yes, ask: \"Can you stand and walk normally?\"       Feels a little lightheaded. Off and on this past week.   8. OTHER SYMPTOMS: \"What other symptoms are you having with the bleeding?\" (e.g., leaking fluid from vagina, contractions)      denies    Protocols used: Pregnancy - Vaginal Bleeding Greater Than 20 Weeks EGA-ADULT-[place]    "

## 2024-04-26 NOTE — PROGRESS NOTES
Patient is a 29 YO  female presenting to the office at 28.3 weeks for routine OB care.   BP: 120/74  TWlb  Fetal Movement: yes good movement  Feeling well today  LOF: no  VB: spotting - brown/red when wiping  (Speculum exam reveals mostly brown blood in vaginal vault, minimal red blood. No active bleeding noted)  Precautions reviewed with patient. Call if heavier or persistent bleeding or pain.   CTX: no  Discussed third trimester teaching  Reviewed fetal kick counts, normal FM  Reviewed signs of PTL  Reviewed red folder, consents, birth plan  Delivery consent obtained   Breastfeeding: plans to  Breast Pump: ordered  TDAP: received today  FLU Vaccine: received  COVID Vaccine: received  Rhogam: received today  28 Week Labs: failed 1 hour, needs 3 hour  RTO 2 weeks for routine OB F/U

## 2024-04-30 DIAGNOSIS — D50.9 IRON DEFICIENCY ANEMIA DURING PREGNANCY: ICD-10-CM

## 2024-04-30 DIAGNOSIS — O99.019 IRON DEFICIENCY ANEMIA DURING PREGNANCY: ICD-10-CM

## 2024-04-30 DIAGNOSIS — Z3A.28 28 WEEKS GESTATION OF PREGNANCY: Primary | ICD-10-CM

## 2024-04-30 RX ORDER — SODIUM CHLORIDE 9 MG/ML
20 INJECTION, SOLUTION INTRAVENOUS ONCE
Status: CANCELLED | OUTPATIENT
Start: 2024-05-02

## 2024-04-30 RX ORDER — SODIUM CHLORIDE 9 MG/ML
20 INJECTION, SOLUTION INTRAVENOUS ONCE
Status: CANCELLED | OUTPATIENT
Start: 2024-05-05

## 2024-05-01 ENCOUNTER — TELEPHONE (OUTPATIENT)
Dept: OBGYN CLINIC | Facility: CLINIC | Age: 29
End: 2024-05-01

## 2024-05-01 ENCOUNTER — TELEPHONE (OUTPATIENT)
Dept: INFUSION CENTER | Facility: HOSPITAL | Age: 29
End: 2024-05-01

## 2024-05-01 NOTE — TELEPHONE ENCOUNTER
Kellen from [facility] called and asked if Pt is currently taking a prenatal vitamin and if she has tried/failed oral iron.   Called Pt and she states that she takes a prenatal vitamin with iron daily but has not taken oral iron due to her Crohn's disease. She has been or iron infusions prior to pregnancy also because of her Crohn's disease.   Spoke with Kellen and she is aware of Pt information.

## 2024-05-02 ENCOUNTER — HOSPITAL ENCOUNTER (OUTPATIENT)
Dept: INFUSION CENTER | Facility: HOSPITAL | Age: 29
Discharge: HOME | End: 2024-05-02
Attending: OBSTETRICS & GYNECOLOGY
Payer: COMMERCIAL

## 2024-05-02 VITALS
DIASTOLIC BLOOD PRESSURE: 82 MMHG | TEMPERATURE: 97.4 F | SYSTOLIC BLOOD PRESSURE: 132 MMHG | RESPIRATION RATE: 18 BRPM | HEART RATE: 94 BPM

## 2024-05-02 DIAGNOSIS — D50.9 IRON DEFICIENCY ANEMIA DURING PREGNANCY: ICD-10-CM

## 2024-05-02 DIAGNOSIS — O99.019 IRON DEFICIENCY ANEMIA DURING PREGNANCY: ICD-10-CM

## 2024-05-02 DIAGNOSIS — Z3A.28 28 WEEKS GESTATION OF PREGNANCY: Primary | ICD-10-CM

## 2024-05-02 LAB
DME PARACHUTE DELIVERY DATE ACTUAL: NORMAL
DME PARACHUTE DELIVERY DATE REQUESTED: NORMAL
DME PARACHUTE ITEM DESCRIPTION: NORMAL
DME PARACHUTE ORDER STATUS: NORMAL
DME PARACHUTE SUPPLIER NAME: NORMAL
DME PARACHUTE SUPPLIER PHONE: NORMAL

## 2024-05-02 PROCEDURE — 96365 THER/PROPH/DIAG IV INF INIT: CPT

## 2024-05-02 RX ORDER — SODIUM CHLORIDE 9 MG/ML
20 INJECTION, SOLUTION INTRAVENOUS ONCE
Status: CANCELLED | OUTPATIENT
Start: 2024-05-11

## 2024-05-02 RX ORDER — SODIUM CHLORIDE 9 MG/ML
20 INJECTION, SOLUTION INTRAVENOUS ONCE
Status: COMPLETED | OUTPATIENT
Start: 2024-05-02 | End: 2024-05-02

## 2024-05-02 RX ADMIN — IRON SUCROSE 200 MG: 20 INJECTION, SOLUTION INTRAVENOUS at 08:51

## 2024-05-02 RX ADMIN — SODIUM CHLORIDE 20 ML/HR: 0.9 INJECTION, SOLUTION INTRAVENOUS at 08:51

## 2024-05-02 NOTE — PROGRESS NOTES
Antony Rodriguez  tolerated treatment well with no complications.      nAtony Rodriguez is aware of future appt on 5/8/24 at 3:30 pm at [facility].     AVS printed and given to Antony Rodriguez:  No (Declined by Antony Rodriguez)

## 2024-05-04 ENCOUNTER — APPOINTMENT (OUTPATIENT)
Dept: LAB | Facility: HOSPITAL | Age: 29
End: 2024-05-04
Payer: COMMERCIAL

## 2024-05-04 DIAGNOSIS — R73.09 ABNORMAL GLUCOSE TOLERANCE TEST: Primary | ICD-10-CM

## 2024-05-04 LAB
GLUCOSE 1H P 100 G GLC PO SERPL-MCNC: 172 MG/DL (ref 65–179)
GLUCOSE 2H P 100 G GLC PO SERPL-MCNC: 136 MG/DL (ref 65–154)
GLUCOSE 3H P 100 G GLC PO SERPL-MCNC: 92 MG/DL (ref 65–139)
GLUCOSE P FAST SERPL-MCNC: 85 MG/DL (ref 65–94)

## 2024-05-04 PROCEDURE — 82952 GTT-ADDED SAMPLES: CPT

## 2024-05-04 PROCEDURE — 36415 COLL VENOUS BLD VENIPUNCTURE: CPT

## 2024-05-04 PROCEDURE — 82951 GLUCOSE TOLERANCE TEST (GTT): CPT

## 2024-05-08 ENCOUNTER — HOSPITAL ENCOUNTER (OUTPATIENT)
Dept: INFUSION CENTER | Facility: CLINIC | Age: 29
Discharge: HOME | End: 2024-05-08
Payer: COMMERCIAL

## 2024-05-08 VITALS
DIASTOLIC BLOOD PRESSURE: 67 MMHG | RESPIRATION RATE: 18 BRPM | HEART RATE: 88 BPM | SYSTOLIC BLOOD PRESSURE: 145 MMHG | TEMPERATURE: 96.9 F

## 2024-05-08 DIAGNOSIS — D50.9 IRON DEFICIENCY ANEMIA DURING PREGNANCY: ICD-10-CM

## 2024-05-08 DIAGNOSIS — Z3A.28 28 WEEKS GESTATION OF PREGNANCY: Primary | ICD-10-CM

## 2024-05-08 DIAGNOSIS — O99.019 IRON DEFICIENCY ANEMIA DURING PREGNANCY: ICD-10-CM

## 2024-05-08 RX ORDER — SODIUM CHLORIDE 9 MG/ML
20 INJECTION, SOLUTION INTRAVENOUS ONCE
Status: CANCELLED | OUTPATIENT
Start: 2024-05-11

## 2024-05-08 RX ORDER — SODIUM CHLORIDE 9 MG/ML
20 INJECTION, SOLUTION INTRAVENOUS ONCE
Status: COMPLETED | OUTPATIENT
Start: 2024-05-08 | End: 2024-05-08

## 2024-05-08 RX ADMIN — IRON SUCROSE 200 MG: 20 INJECTION, SOLUTION INTRAVENOUS at 15:37

## 2024-05-08 RX ADMIN — SODIUM CHLORIDE 20 ML/HR: 9 INJECTION, SOLUTION INTRAVENOUS at 15:37

## 2024-05-08 NOTE — PROGRESS NOTES
Pt presents for venofer infusion offering no complaints. Venofer infusing without difficulty, report given to eleanor AYALA. Next appointment reviewed on 5/11 at 9am.

## 2024-05-08 NOTE — PROGRESS NOTES
Confirmed with Alisa Omalley PA-C, patient is to receive 200 mg venofer twice a week for 8 total treatments.

## 2024-05-08 NOTE — PROGRESS NOTES
Patient tolerated remainder of Venofer infusion without complication. PIV removed.     Next appointment: 5/11/24 @ 0900

## 2024-05-10 ENCOUNTER — ROUTINE PRENATAL (OUTPATIENT)
Dept: OBGYN CLINIC | Facility: CLINIC | Age: 29
End: 2024-05-10

## 2024-05-10 ENCOUNTER — ULTRASOUND (OUTPATIENT)
Facility: HOSPITAL | Age: 29
End: 2024-05-10
Payer: COMMERCIAL

## 2024-05-10 VITALS — BODY MASS INDEX: 43.26 KG/M2 | SYSTOLIC BLOOD PRESSURE: 114 MMHG | DIASTOLIC BLOOD PRESSURE: 72 MMHG | WEIGHT: 268 LBS

## 2024-05-10 VITALS
HEART RATE: 92 BPM | DIASTOLIC BLOOD PRESSURE: 64 MMHG | SYSTOLIC BLOOD PRESSURE: 136 MMHG | BODY MASS INDEX: 43.23 KG/M2 | HEIGHT: 66 IN | WEIGHT: 269 LBS

## 2024-05-10 DIAGNOSIS — Z3A.30 30 WEEKS GESTATION OF PREGNANCY: ICD-10-CM

## 2024-05-10 DIAGNOSIS — E06.3 HYPOTHYROIDISM DUE TO HASHIMOTO'S THYROIDITIS: ICD-10-CM

## 2024-05-10 DIAGNOSIS — E03.8 HYPOTHYROIDISM DUE TO HASHIMOTO'S THYROIDITIS: ICD-10-CM

## 2024-05-10 DIAGNOSIS — O44.03 PLACENTA PREVIA IN THIRD TRIMESTER: Primary | ICD-10-CM

## 2024-05-10 DIAGNOSIS — K50.10 CROHN'S DISEASE OF COLON WITHOUT COMPLICATION (HCC): ICD-10-CM

## 2024-05-10 DIAGNOSIS — O44.40 LOW LYING PLACENTA WITHOUT HEMORRHAGE, ANTEPARTUM: ICD-10-CM

## 2024-05-10 DIAGNOSIS — O26.893 RH NEGATIVE STATE IN ANTEPARTUM PERIOD, THIRD TRIMESTER: Primary | ICD-10-CM

## 2024-05-10 DIAGNOSIS — Z67.91 RH NEGATIVE STATE IN ANTEPARTUM PERIOD, THIRD TRIMESTER: Primary | ICD-10-CM

## 2024-05-10 PROCEDURE — 76816 OB US FOLLOW-UP PER FETUS: CPT | Performed by: OBSTETRICS & GYNECOLOGY

## 2024-05-10 PROCEDURE — 76817 TRANSVAGINAL US OBSTETRIC: CPT | Performed by: OBSTETRICS & GYNECOLOGY

## 2024-05-10 PROCEDURE — PNV: Performed by: PHYSICIAN ASSISTANT

## 2024-05-10 PROCEDURE — 99214 OFFICE O/P EST MOD 30 MIN: CPT | Performed by: OBSTETRICS & GYNECOLOGY

## 2024-05-10 NOTE — PROGRESS NOTES
Ultrasound Probe Disinfection    A transvaginal ultrasound was performed.   Prior to use, disinfection was performed with [employer] ([employer]).  Probe serial number [id number]: [id number] was used.      Nanette Rowan  05/10/24  2:06 PM

## 2024-05-10 NOTE — LETTER
May 11, 2024     Cassie Alicea MD  [address]  [address]  [place] [address]    Patient: Antony Rodriguez   YOB: 1995   Date of Visit: 5/10/2024       Dear Dr. Alicea:    Thank you for referring Antony Rodriguez to me for evaluation. Below are my notes for this consultation.    If you have questions, please do not hesitate to call me. I look forward to following your patient along with you.         Sincerely,        Blanca العراقي MD        CC: No Recipients    Blanca العراقي MD  5/11/2024  8:03 AM  Signed  Antony Rodriguez has no complaints today.  She reports regular fetal movements and does not report any problems.  She is here today at 30w3d for an ultrasound for fetal growth and placental location    Problem list:  Low-lying placenta at her anatomy scan that measures within 2 mm of the internal os.  She had some spotting at 28 weeks that resolved and received RhoGAM.  Hashimoto's thyroiditis with a normal TSH of 0.957 on 4/24  Vitamin D deficiency currently on vitamin D supplementation  Maternal anemia with a hemoglobin of 9.4 after a exacerbation of her Crohn's and she is being started on IV iron infusions.  Crohn's currently controlled today on Humira  Common variable immunodeficiency receiving subcu immunoglobulin weekly.  O- blood type    Ultrasound findings:  The ultrasound today shows normal interval fetal growth and fluid.  The placenta today both transvaginally and abdominally still looks like the placenta reaches the internal os which would make it a previa.    Pregnancy ultrasound has limitations and is unable to detect all forms of fetal congenital abnormalities.  The inaccuracy in the EFW can be off by 1 lb either way in the third trimester.    Specific counseling was provided on the following problems:  1.  Pelvic rest.  We discussed that views of her placental location looks very similar to what it was at 21 weeks and 3 days almost as if it has not moved.  She  reports no history of uterine surgery.  This increases the chance that it may not resolve by 37 weeks and that she may need to have a  section for delivery.     We discussed the the placenta should be at least 1 cm or 10 mm from the internal os to be encouraged to deliver vaginally based on the following study.     95 women with a previa were scanned within 28 days of delivery. Cases in which the placental edge overlapped the internal cervical (n = 42) underwent  section delivery. Labor was allowed in those with placental edge to internal os distance of 1-10 mm (group 1, 24 women) and those with a distance of 11-20 mm (group 2, 29 women). Rates of  section delivery were compared (75% vs 31%) and of bleeding before labor (29% vs 3%)and were higher in group 1 than in group 2. Blood loss at delivery (662 +/- 466 mL vs 510 +/- 547 mL) and rate of severe postpartum hemorrhage (21% vs 10%) were similar in the 2 groups.  In this study more than two-thirds of women with a placental edge to cervical os distance of>10 mm delivered vaginally with minimal morbidity. Thus it has been suggested  that in the absence of any other contraindications to a vaginal delivery, these patients may be offered a trial of labor.     Therefore if 34-36 week US shows the placenta is less then 10 mm to the internal os recommend CS at 37 weeks. If > 10 mm are found then would allow vaginal delivery attempt but would encourage patient to present early in labor for closer observation. If delivery is planned prior to 37 weeks due to bleeding issues or  labor onset then recommend steroids to enhance fetal lung maturity if timing allows.       Follow up recommended:   Recommend a follow-up ultrasound at 35 weeks for growth and missed anatomy and placental location.     Pre visit time reviewing her records   10 minutes  Face to face time 10 minutes  Post visit time on documentation of note, updating her problem list,  adding orders and prescriptions 15 minutes.  Procedures that were completed today were charged separately.   The level of decision making was moderate complexity.    Blanca العراقي MD

## 2024-05-10 NOTE — PROGRESS NOTES
Patient is a 29 YO  female presenting to the office at 30.3 weeks for routine OB care.   BP: 142/80, REPEAT 114/72  TWlb  Fetal Movement: yes good movement  LOF: no  VB: no  CTX: no  Patient wears glasses. Reports some episodes of blurry vision. Will check labs as protein in urine +1 in our office today. Normotensive.   CBC/CMP/PC ratio ordered - pt going for venofer infusion tomorrow and will get labs at that time  IVIG scheduled for next week  Had M US today to eval for low lying placenta - Report not yet written. Patient reports she was told she may need a 1LTCS at 37 weeks due to low lying placenta. She has a f/u US at 35 weeks to assess placental location.   Discussed plan to schedule c/section in advance to optimize OR time/time of procedure. Will await report from MFM and will reach out to patient Monday to schedule.   Reviewed precautions  Call for concerns  RTO 2 weeks

## 2024-05-10 NOTE — LETTER
May 11, 2024     Cassie Alicea MD  [address]  [address]  [place] [address]    Patient: Antony Rodriguez   YOB: 1995   Date of Visit: 5/10/2024       Dear Dr. Alicea:    Thank you for referring Antony Rodriguez to me for evaluation. Below are my notes for this consultation.    If you have questions, please do not hesitate to call me. I look forward to following your patient along with you.         Sincerely,        Blanca العراقي MD        CC: No Recipients    Blanca العراقي MD  5/11/2024  7:54 AM  Incomplete  Antony Rodriguez has no complaints today.  She reports regular fetal movements and does not report any problems.  She is here today at 30w3d for an ultrasound for fetal growth and placental location    Problem list:  Low-lying placenta at her anatomy scan that measures within 2 mm of the internal os.  She had some spotting at 28 weeks that resolved and received RhoGAM.  Hashimoto's thyroiditis with a normal TSH of 0.957 on 4/24  Vitamin D deficiency currently on vitamin D supplementation  Maternal anemia with a hemoglobin of 9.4 after a exacerbation of her Crohn's and she is being started on IV iron infusions.  Crohn's currently controlled today on Humira  Common variable immunodeficiency receiving subcu immunoglobulin weekly.  O- blood type    Ultrasound findings:  The ultrasound today shows normal interval fetal growth and fluid.  The placenta today both transvaginally and abdominally still looks like the placenta reaches the internal os which would make it a previa.    Pregnancy ultrasound has limitations and is unable to detect all forms of fetal congenital abnormalities.  The inaccuracy in the EFW can be off by 1 lb either way in the third trimester.    Specific counseling was provided on the following problems:  1.  Pelvic rest.  We discussed that views of her placental location looks very similar to what it was at 21 weeks and 3 days almost as if it has not moved.   She reports no history of uterine surgery.  This increases the chance that it may not resolve by 37 weeks and that she may need to have a  section for delivery.     We discussed the the placenta should be at least 1 cm or 10 mm from the internal os to be encouraged to deliver vaginally based on the following study.     95 women with a previa were scanned within 28 days of delivery. Cases in which the placental edge overlapped the internal cervical (n = 42) underwent  section delivery. Labor was allowed in those with placental edge to internal os distance of 1-10 mm (group 1, 24 women) and those with a distance of 11-20 mm (group 2, 29 women). Rates of  section delivery were compared (75% vs 31%) and of bleeding before labor (29% vs 3%)and were higher in group 1 than in group 2. Blood loss at delivery (662 +/- 466 mL vs 510 +/- 547 mL) and rate of severe postpartum hemorrhage (21% vs 10%) were similar in the 2 groups.  In this study more than two-thirds of women with a placental edge to cervical os distance of>10 mm delivered vaginally with minimal morbidity. Thus it has been suggested  that in the absence of any other contraindications to a vaginal delivery, these patients may be offered a trial of labor.     Therefore if 34-36 week US shows the placenta is less then 10 mm to the internal os recommend CS at 37 weeks. If > 10 mm are found then would allow vaginal delivery attempt but would encourage patient to present early in labor for closer observation. If delivery is planned prior to 37 weeks due to bleeding issues or  labor onset then recommend steroids to enhance fetal lung maturity if timing allows.       Follow up recommended:   Recommend a follow-up ultrasound at 35 weeks for growth and missed anatomy and placental location.     Pre visit time reviewing her records   10 minutes  Face to face time 10 minutes  Post visit time on documentation of note, updating her problem list,  adding orders and prescriptions 15 minutes.  Procedures that were completed today were charged separately.   The level of decision making was moderate complexity.    Blanca العراقي MD

## 2024-05-10 NOTE — PROGRESS NOTES
Antony Rodriguez has no complaints today.  She reports regular fetal movements and does not report any problems.  She is here today at 30w3d for an ultrasound for fetal growth and placental location    Problem list:  Low-lying placenta at her anatomy scan that measures within 2 mm of the internal os.  She had some spotting at 28 weeks that resolved and received RhoGAM.  Hashimoto's thyroiditis with a normal TSH of 0.957 on   Vitamin D deficiency currently on vitamin D supplementation  Maternal anemia with a hemoglobin of 9.4 after a exacerbation of her Crohn's and she is being started on IV iron infusions.  Crohn's currently controlled today on Humira  Common variable immunodeficiency receiving subcu immunoglobulin weekly.  O- blood type    Ultrasound findings:  The ultrasound today shows normal interval fetal growth and fluid.  The placenta today both transvaginally and abdominally still looks like the placenta reaches the internal os which would make it a previa.    Pregnancy ultrasound has limitations and is unable to detect all forms of fetal congenital abnormalities.  The inaccuracy in the EFW can be off by 1 lb either way in the third trimester.    Specific counseling was provided on the following problems:  1.  Pelvic rest.  We discussed that views of her placental location looks very similar to what it was at 21 weeks and 3 days almost as if it has not moved.  She reports no history of uterine surgery.  This increases the chance that it may not resolve by 37 weeks and that she may need to have a  section for delivery.     We discussed the the placenta should be at least 1 cm or 10 mm from the internal os to be encouraged to deliver vaginally based on the following study.     95 women with a previa were scanned within 28 days of delivery. Cases in which the placental edge overlapped the internal cervical (n = 42) underwent  section delivery. Labor was allowed in those with placental edge to  internal os distance of 1-10 mm (group 1, 24 women) and those with a distance of 11-20 mm (group 2, 29 women). Rates of  section delivery were compared (75% vs 31%) and of bleeding before labor (29% vs 3%)and were higher in group 1 than in group 2. Blood loss at delivery (662 +/- 466 mL vs 510 +/- 547 mL) and rate of severe postpartum hemorrhage (21% vs 10%) were similar in the 2 groups.  In this study more than two-thirds of women with a placental edge to cervical os distance of>10 mm delivered vaginally with minimal morbidity. Thus it has been suggested  that in the absence of any other contraindications to a vaginal delivery, these patients may be offered a trial of labor.     Therefore if 34-36 week US shows the placenta is less then 10 mm to the internal os recommend CS at 37 weeks. If > 10 mm are found then would allow vaginal delivery attempt but would encourage patient to present early in labor for closer observation. If delivery is planned prior to 37 weeks due to bleeding issues or  labor onset then recommend steroids to enhance fetal lung maturity if timing allows.       Follow up recommended:   Recommend a follow-up ultrasound at 35 weeks for growth and missed anatomy and placental location.     Pre visit time reviewing her records   10 minutes  Face to face time 10 minutes  Post visit time on documentation of note, updating her problem list, adding orders and prescriptions 15 minutes.  Procedures that were completed today were charged separately.   The level of decision making was moderate complexity.    Blanca العراقي MD

## 2024-05-11 ENCOUNTER — APPOINTMENT (OUTPATIENT)
Dept: LAB | Facility: CLINIC | Age: 29
End: 2024-05-11
Payer: COMMERCIAL

## 2024-05-11 ENCOUNTER — HOSPITAL ENCOUNTER (OUTPATIENT)
Dept: INFUSION CENTER | Facility: HOSPITAL | Age: 29
Discharge: HOME | End: 2024-05-11
Attending: OBSTETRICS & GYNECOLOGY
Payer: COMMERCIAL

## 2024-05-11 VITALS
TEMPERATURE: 98 F | RESPIRATION RATE: 18 BRPM | HEART RATE: 100 BPM | SYSTOLIC BLOOD PRESSURE: 156 MMHG | DIASTOLIC BLOOD PRESSURE: 84 MMHG

## 2024-05-11 DIAGNOSIS — O44.40 LOW LYING PLACENTA WITHOUT HEMORRHAGE, ANTEPARTUM: ICD-10-CM

## 2024-05-11 DIAGNOSIS — O99.019 IRON DEFICIENCY ANEMIA DURING PREGNANCY: ICD-10-CM

## 2024-05-11 DIAGNOSIS — Z3A.30 30 WEEKS GESTATION OF PREGNANCY: ICD-10-CM

## 2024-05-11 DIAGNOSIS — D50.9 IRON DEFICIENCY ANEMIA DURING PREGNANCY: ICD-10-CM

## 2024-05-11 DIAGNOSIS — Z3A.30 30 WEEKS GESTATION OF PREGNANCY: Primary | ICD-10-CM

## 2024-05-11 DIAGNOSIS — Z67.91 RH NEGATIVE STATE IN ANTEPARTUM PERIOD, THIRD TRIMESTER: ICD-10-CM

## 2024-05-11 DIAGNOSIS — O26.893 RH NEGATIVE STATE IN ANTEPARTUM PERIOD, THIRD TRIMESTER: ICD-10-CM

## 2024-05-11 PROBLEM — O44.03 PLACENTA PREVIA IN THIRD TRIMESTER: Status: ACTIVE | Noted: 2024-03-08

## 2024-05-11 LAB
ALBUMIN SERPL BCP-MCNC: 3.4 G/DL (ref 3.5–5)
ALP SERPL-CCNC: 104 U/L (ref 34–104)
ALT SERPL W P-5'-P-CCNC: 13 U/L (ref 7–52)
ANION GAP SERPL CALCULATED.3IONS-SCNC: 9 MMOL/L (ref 4–13)
AST SERPL W P-5'-P-CCNC: 19 U/L (ref 13–39)
BASOPHILS # BLD AUTO: 0.04 THOUSANDS/ÂΜL (ref 0–0.1)
BASOPHILS NFR BLD AUTO: 0 % (ref 0–1)
BILIRUB SERPL-MCNC: 0.28 MG/DL (ref 0.2–1)
BUN SERPL-MCNC: 7 MG/DL (ref 5–25)
CALCIUM ALBUM COR SERPL-MCNC: 8.9 MG/DL (ref 8.3–10.1)
CALCIUM SERPL-MCNC: 8.4 MG/DL (ref 8.4–10.2)
CHLORIDE SERPL-SCNC: 105 MMOL/L (ref 96–108)
CO2 SERPL-SCNC: 21 MMOL/L (ref 21–32)
CREAT SERPL-MCNC: 0.51 MG/DL (ref 0.6–1.3)
CREAT UR-MCNC: 47.9 MG/DL
EOSINOPHIL # BLD AUTO: 0.07 THOUSAND/ÂΜL (ref 0–0.61)
EOSINOPHIL NFR BLD AUTO: 1 % (ref 0–6)
ERYTHROCYTE [DISTWIDTH] IN BLOOD BY AUTOMATED COUNT: 16.5 % (ref 11.6–15.1)
GFR SERPL CREATININE-BSD FRML MDRD: 133 ML/MIN/1.73SQ M
GLUCOSE SERPL-MCNC: 103 MG/DL (ref 65–140)
HCT VFR BLD AUTO: 32.3 % (ref 34.8–46.1)
HGB BLD-MCNC: 10.1 G/DL (ref 11.5–15.4)
IMM GRANULOCYTES # BLD AUTO: 0.32 THOUSAND/UL (ref 0–0.2)
IMM GRANULOCYTES NFR BLD AUTO: 2 % (ref 0–2)
LYMPHOCYTES # BLD AUTO: 2.29 THOUSANDS/ÂΜL (ref 0.6–4.47)
LYMPHOCYTES NFR BLD AUTO: 17 % (ref 14–44)
MCH RBC QN AUTO: 27.4 PG (ref 26.8–34.3)
MCHC RBC AUTO-ENTMCNC: 31.3 G/DL (ref 31.4–37.4)
MCV RBC AUTO: 88 FL (ref 82–98)
MONOCYTES # BLD AUTO: 0.96 THOUSAND/ÂΜL (ref 0.17–1.22)
MONOCYTES NFR BLD AUTO: 7 % (ref 4–12)
NEUTROPHILS # BLD AUTO: 9.69 THOUSANDS/ÂΜL (ref 1.85–7.62)
NEUTS SEG NFR BLD AUTO: 73 % (ref 43–75)
NRBC BLD AUTO-RTO: 0 /100 WBCS
PLATELET # BLD AUTO: 250 THOUSANDS/UL (ref 149–390)
PMV BLD AUTO: 11.6 FL (ref 8.9–12.7)
POTASSIUM SERPL-SCNC: 3.8 MMOL/L (ref 3.5–5.3)
PROT SERPL-MCNC: 6.7 G/DL (ref 6.4–8.4)
PROT UR-MCNC: 7 MG/DL
PROT/CREAT UR: 0.15 MG/G{CREAT} (ref 0–0.1)
RBC # BLD AUTO: 3.68 MILLION/UL (ref 3.81–5.12)
SODIUM SERPL-SCNC: 135 MMOL/L (ref 135–147)
WBC # BLD AUTO: 13.37 THOUSAND/UL (ref 4.31–10.16)

## 2024-05-11 PROCEDURE — 96365 THER/PROPH/DIAG IV INF INIT: CPT

## 2024-05-11 PROCEDURE — 82570 ASSAY OF URINE CREATININE: CPT

## 2024-05-11 PROCEDURE — 84156 ASSAY OF PROTEIN URINE: CPT

## 2024-05-11 PROCEDURE — 80053 COMPREHEN METABOLIC PANEL: CPT

## 2024-05-11 PROCEDURE — 85025 COMPLETE CBC W/AUTO DIFF WBC: CPT

## 2024-05-11 PROCEDURE — 36415 COLL VENOUS BLD VENIPUNCTURE: CPT

## 2024-05-11 RX ORDER — SODIUM CHLORIDE 9 MG/ML
20 INJECTION, SOLUTION INTRAVENOUS ONCE
Status: COMPLETED | OUTPATIENT
Start: 2024-05-11 | End: 2024-05-11

## 2024-05-11 RX ORDER — SODIUM CHLORIDE 9 MG/ML
20 INJECTION, SOLUTION INTRAVENOUS ONCE
Status: CANCELLED | OUTPATIENT
Start: 2024-05-15

## 2024-05-11 RX ADMIN — SODIUM CHLORIDE 20 ML/HR: 0.9 INJECTION, SOLUTION INTRAVENOUS at 08:50

## 2024-05-11 RX ADMIN — IRON SUCROSE 200 MG: 20 INJECTION, SOLUTION INTRAVENOUS at 08:50

## 2024-05-11 NOTE — PROGRESS NOTES
Antony Rodriguez  tolerated Venofer well with no complications.      Antony Rodriguez is aware of future appt on 5/15 at 1430.     AVS printed:   No (Declined by Antony Rodriguez)

## 2024-05-13 ENCOUNTER — PATIENT MESSAGE (OUTPATIENT)
Dept: OBGYN CLINIC | Facility: CLINIC | Age: 29
End: 2024-05-13

## 2024-05-13 NOTE — TELEPHONE ENCOUNTER
Please call patient. Labs were normal but I would recommend she come to our office Thursday or Friday this week for a BP check

## 2024-05-14 ENCOUNTER — TELEPHONE (OUTPATIENT)
Age: 29
End: 2024-05-14

## 2024-05-14 NOTE — TELEPHONE ENCOUNTER
Patient called back to review lab results and schedule blood pressure check. Warm transferred to office to assist with scheduling appointment.

## 2024-05-15 ENCOUNTER — HOSPITAL ENCOUNTER (OUTPATIENT)
Dept: INFUSION CENTER | Facility: CLINIC | Age: 29
Discharge: HOME | End: 2024-05-15
Payer: COMMERCIAL

## 2024-05-15 VITALS
HEART RATE: 106 BPM | SYSTOLIC BLOOD PRESSURE: 135 MMHG | RESPIRATION RATE: 18 BRPM | TEMPERATURE: 97.3 F | DIASTOLIC BLOOD PRESSURE: 81 MMHG

## 2024-05-15 DIAGNOSIS — O99.019 IRON DEFICIENCY ANEMIA DURING PREGNANCY: ICD-10-CM

## 2024-05-15 DIAGNOSIS — D50.9 IRON DEFICIENCY ANEMIA DURING PREGNANCY: ICD-10-CM

## 2024-05-15 DIAGNOSIS — Z3A.30 30 WEEKS GESTATION OF PREGNANCY: Primary | ICD-10-CM

## 2024-05-15 PROCEDURE — 96365 THER/PROPH/DIAG IV INF INIT: CPT

## 2024-05-15 RX ORDER — SODIUM CHLORIDE 9 MG/ML
20 INJECTION, SOLUTION INTRAVENOUS ONCE
Status: CANCELLED | OUTPATIENT
Start: 2024-05-15

## 2024-05-15 RX ORDER — SODIUM CHLORIDE 9 MG/ML
20 INJECTION, SOLUTION INTRAVENOUS ONCE
Status: COMPLETED | OUTPATIENT
Start: 2024-05-15 | End: 2024-05-15

## 2024-05-15 RX ADMIN — SODIUM CHLORIDE 20 ML/HR: 9 INJECTION, SOLUTION INTRAVENOUS at 14:39

## 2024-05-15 RX ADMIN — IRON SUCROSE 200 MG: 20 INJECTION, SOLUTION INTRAVENOUS at 14:39

## 2024-05-15 NOTE — PROGRESS NOTES
Pt presents for venofer infusion offering no complaints. Pt tolerated treatment without incident. PIV removed. AVS declined, next appointment reviewed on 5/17 at 11am.

## 2024-05-17 ENCOUNTER — HOSPITAL ENCOUNTER (OUTPATIENT)
Dept: INFUSION CENTER | Facility: CLINIC | Age: 29
Discharge: HOME | End: 2024-05-17

## 2024-05-18 ENCOUNTER — HOSPITAL ENCOUNTER (OUTPATIENT)
Dept: INFUSION CENTER | Facility: HOSPITAL | Age: 29
Discharge: HOME | End: 2024-05-18
Attending: OBSTETRICS & GYNECOLOGY
Payer: COMMERCIAL

## 2024-05-18 VITALS
RESPIRATION RATE: 20 BRPM | DIASTOLIC BLOOD PRESSURE: 84 MMHG | TEMPERATURE: 96.4 F | HEART RATE: 106 BPM | SYSTOLIC BLOOD PRESSURE: 143 MMHG

## 2024-05-18 DIAGNOSIS — O99.019 IRON DEFICIENCY ANEMIA DURING PREGNANCY: Primary | ICD-10-CM

## 2024-05-18 DIAGNOSIS — Z3A.30 30 WEEKS GESTATION OF PREGNANCY: ICD-10-CM

## 2024-05-18 DIAGNOSIS — D50.9 IRON DEFICIENCY ANEMIA DURING PREGNANCY: Primary | ICD-10-CM

## 2024-05-18 RX ORDER — SODIUM CHLORIDE 9 MG/ML
20 INJECTION, SOLUTION INTRAVENOUS ONCE
Status: COMPLETED | OUTPATIENT
Start: 2024-05-18 | End: 2024-05-18

## 2024-05-18 RX ORDER — SODIUM CHLORIDE 9 MG/ML
20 INJECTION, SOLUTION INTRAVENOUS ONCE
Status: CANCELLED | OUTPATIENT
Start: 2024-05-24

## 2024-05-18 RX ADMIN — IRON SUCROSE 200 MG: 20 INJECTION, SOLUTION INTRAVENOUS at 08:53

## 2024-05-18 RX ADMIN — SODIUM CHLORIDE 20 ML/HR: 0.9 INJECTION, SOLUTION INTRAVENOUS at 08:53

## 2024-05-18 NOTE — PROGRESS NOTES
Antony Rodriguez  tolerated treatment well with no complications.      Antony Rodriguez is aware of future appt on 05/24/2024 at 10:30 am.     Venofer infusion given without incident.  Patient declined printed AVS.  Patient to return to department as scheduled.

## 2024-05-19 ENCOUNTER — CLINICAL SUPPORT (OUTPATIENT)
Age: 29
End: 2024-05-19

## 2024-05-19 DIAGNOSIS — Z32.2 ENCOUNTER FOR CHILDBIRTH INSTRUCTION: Primary | ICD-10-CM

## 2024-05-20 NOTE — PROGRESS NOTES
PT Evaluation     Today's date: 2024  Patient name: Antony Rodriguez  : 1995  MRN: [id number]  Referring provider: Alisa Omalley, *  Dx:   Encounter Diagnosis     ICD-10-CM    1. Back pain in pregnancy  O99.891     M54.9                      Assessment  Impairments: abnormal or restricted ROM, impaired physical strength, lacks appropriate home exercise program and pain with function  Symptom irritability: moderate    Assessment details: Antony is a 28 year old female 32W1D  gestation who presents with primary concern of back and pelvic pain during pregnancy. Impairments as outlined. Patient instructed in HEP and self care/management techniques today. HEP instruction this date. Verbalized and demonstrated understanding. Written handouts provided. Time spent in patient education regarding PFM anatomy. Patient could benefit from a trial of PFPT to address presenting impairments and functional limitations and improve overall quality of life.   Understanding of Dx/Px/POC: good     Prognosis: good    Goals  Goals  STG 1 weeks  1. HEP instruction  2.  Decrease pain 25% or greater  LTG 3-4 weeks  1.  Decrease pain 50% or greater independence in an ongoing home exercise program  2. [place] in an ongoing home exercise program  3.  Improved standing tolerance for functional tasks and work related tasks  4. Decreased pain with mat mobility  5. Reduce incidence of MIKY        Plan  Patient would benefit from: skilled physical therapy    Planned therapy interventions: manual therapy, behavior modification, motor coordination training, neuromuscular re-education, patient/caregiver education, therapeutic activities, therapeutic exercise, functional ROM exercises, home exercise program and aquatic therapy    Frequency: 1-2x week  Duration in weeks: 5  Plan of Care beginning date: 2024  Plan of Care expiration date: 2024  Treatment plan discussed with: patient        PT Pelvic Floor Subjective:  "  History of Present Illness:   Noted back pain at start of second trimester -  did some self stretching   Wore SI belt for working and symptoms improved.   Pelvic pain started third trimester  with pubic bone and pubic symphysis pain.  Aggravated with prolonged standing and bending and rolling in bed.   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 as long as possible throughout the pregnancy.Quality of life: good    Social Support:     Lives with:  Spouse    Relationship status: /committed    Work status: employed full time ([employer])    Life stress severity: moderate  Diet and Exercise:      Exercise type: walking    Squatting in the gym  - had some bleeding thus advised to stop              OB/ gyn History    Gestational History:     Delivery Complications:  First pregnancy  Bladder Function:     Voiding Difficulties positive for: frequent urination       Voiding Difficulties comments:     Urinary leakage: urine leakage (one episode earlier this month with sneeze)    Urinary leakage aggravated by: sneezing    Fluid Intake Type:  Water and coffee  Bowel Function:     Voiding DIfficulties: constipation      Bowel frequency: daily    Uses \"squatty potty\": Squatty Potty  Sexual Function:     Sexually Active:  Not sexually active (currently due to placenta position and advised not to)  Pain:     Current pain rating:  3    At worst pain ratin    Location:  Pubic symphysis pelvic pain    Quality:  Sharp and dull ache    Aggravating factors:  Sit to stand transition and prolonged positions (rolling in bed; lifting hips in flexion)    Relieving factors:  Rest, support and change in position  Patient Goals:     Patient goals for therapy:  Decreased pain and improved pain management    Other patient goals:  HEP      Objective     Concurrent Complaints  Positive for night pain and disturbed sleep. Negative for bladder dysfunction, bowel dysfunction and saddle (S4) " "numbness    Postural Observations  Seated posture: good  Standing posture: fair      Neurological Testing     Sensation     Lumbar   Left   Intact: light touch    Right   Intact: light touch    Active Range of Motion     Additional Active Range of Motion Details  Functional ROM for current pregnancy state    Strength/Myotome Testing     Additional Strength Details  No gross motor deficits    Tests     Lumbar     Left   Negative passive SLR.     Right   Negative passive SLR.     Left Hip   Negative BEV.     Right Hip   Negative BEV.     General Comments:      Hip Comments   Limited mobility due to pregnancy  Pain with active hip flexion  4-/5 gross strength             Precautions: RAJIV 24; potential  24      Manuals                                                                 Neuro Re-Ed                                                                                                        Ther Ex             LE stretch 5'            Endurance PFMC 3\"/10\"/5x  HEP            Quick PFMC 1\"/10x  HEP                         Ball adductor PFMC 10x  HEP            TB ER with exhale and PFMC BTB  10x HEP                         TA (hug baby) 3x  HEP            Seated piriformis stretch 20\"/2x HPE            Seated HS stretch 20\"/2x  HEP                         Seated ball                                       Ther Activity                                       Gait Training                                       Modalities                                            "

## 2024-05-22 ENCOUNTER — EVALUATION (OUTPATIENT)
Dept: PHYSICAL THERAPY | Age: 29
End: 2024-05-22
Payer: COMMERCIAL

## 2024-05-22 DIAGNOSIS — O99.891 BACK PAIN IN PREGNANCY: Primary | ICD-10-CM

## 2024-05-22 DIAGNOSIS — M54.9 BACK PAIN IN PREGNANCY: Primary | ICD-10-CM

## 2024-05-22 PROCEDURE — 97110 THERAPEUTIC EXERCISES: CPT | Performed by: PHYSICAL THERAPIST

## 2024-05-22 PROCEDURE — 97161 PT EVAL LOW COMPLEX 20 MIN: CPT | Performed by: PHYSICAL THERAPIST

## 2024-05-24 ENCOUNTER — HOSPITAL ENCOUNTER (OUTPATIENT)
Dept: INFUSION CENTER | Facility: CLINIC | Age: 29
End: 2024-05-24
Payer: COMMERCIAL

## 2024-05-24 ENCOUNTER — ROUTINE PRENATAL (OUTPATIENT)
Dept: OBGYN CLINIC | Facility: CLINIC | Age: 29
End: 2024-05-24

## 2024-05-24 VITALS — BODY MASS INDEX: 44.39 KG/M2 | WEIGHT: 275 LBS | DIASTOLIC BLOOD PRESSURE: 72 MMHG | SYSTOLIC BLOOD PRESSURE: 116 MMHG

## 2024-05-24 VITALS
RESPIRATION RATE: 18 BRPM | HEART RATE: 91 BPM | SYSTOLIC BLOOD PRESSURE: 133 MMHG | TEMPERATURE: 96.9 F | WEIGHT: 275.2 LBS | DIASTOLIC BLOOD PRESSURE: 77 MMHG | BODY MASS INDEX: 44.42 KG/M2

## 2024-05-24 DIAGNOSIS — Z3A.32 32 WEEKS GESTATION OF PREGNANCY: ICD-10-CM

## 2024-05-24 DIAGNOSIS — Z67.91 RH NEGATIVE STATE IN ANTEPARTUM PERIOD, THIRD TRIMESTER: ICD-10-CM

## 2024-05-24 DIAGNOSIS — O99.019 IRON DEFICIENCY ANEMIA DURING PREGNANCY: ICD-10-CM

## 2024-05-24 DIAGNOSIS — O44.40 LOW LYING PLACENTA WITHOUT HEMORRHAGE, ANTEPARTUM: Primary | ICD-10-CM

## 2024-05-24 DIAGNOSIS — O26.893 RH NEGATIVE STATE IN ANTEPARTUM PERIOD, THIRD TRIMESTER: ICD-10-CM

## 2024-05-24 DIAGNOSIS — D50.9 IRON DEFICIENCY ANEMIA DURING PREGNANCY: ICD-10-CM

## 2024-05-24 DIAGNOSIS — E03.9 HYPOTHYROID IN PREGNANCY, ANTEPARTUM, THIRD TRIMESTER: ICD-10-CM

## 2024-05-24 DIAGNOSIS — O13.3 GESTATIONAL HYPERTENSION, THIRD TRIMESTER: ICD-10-CM

## 2024-05-24 DIAGNOSIS — O99.283 HYPOTHYROID IN PREGNANCY, ANTEPARTUM, THIRD TRIMESTER: ICD-10-CM

## 2024-05-24 DIAGNOSIS — D83.9 CVID (COMMON VARIABLE IMMUNODEFICIENCY) (HCC): Primary | ICD-10-CM

## 2024-05-24 PROCEDURE — 96365 THER/PROPH/DIAG IV INF INIT: CPT

## 2024-05-24 PROCEDURE — 96361 HYDRATE IV INFUSION ADD-ON: CPT

## 2024-05-24 PROCEDURE — 96366 THER/PROPH/DIAG IV INF ADDON: CPT

## 2024-05-24 PROCEDURE — PNV

## 2024-05-24 RX ORDER — SODIUM CHLORIDE 9 MG/ML
20 INJECTION, SOLUTION INTRAVENOUS ONCE
Status: COMPLETED | OUTPATIENT
Start: 2024-05-24 | End: 2024-05-24

## 2024-05-24 RX ORDER — ACETAMINOPHEN 325 MG/1
650 TABLET ORAL ONCE
Status: CANCELLED | OUTPATIENT
Start: 2024-05-24

## 2024-05-24 RX ORDER — DIPHENHYDRAMINE HCL 25 MG
50 TABLET ORAL ONCE
Status: CANCELLED | OUTPATIENT
Start: 2024-05-24

## 2024-05-24 RX ORDER — ACETAMINOPHEN 325 MG/1
650 TABLET ORAL ONCE
Status: COMPLETED | OUTPATIENT
Start: 2024-05-24 | End: 2024-05-24

## 2024-05-24 RX ORDER — SODIUM CHLORIDE 9 MG/ML
20 INJECTION, SOLUTION INTRAVENOUS ONCE
Status: CANCELLED | OUTPATIENT
Start: 2024-05-24

## 2024-05-24 RX ORDER — DIPHENHYDRAMINE HCL 25 MG
50 TABLET ORAL ONCE
Status: COMPLETED | OUTPATIENT
Start: 2024-05-24 | End: 2024-05-24

## 2024-05-24 RX ADMIN — SODIUM CHLORIDE 20 ML/HR: 0.9 INJECTION, SOLUTION INTRAVENOUS at 11:54

## 2024-05-24 RX ADMIN — SODIUM CHLORIDE 500 ML: 0.9 INJECTION, SOLUTION INTRAVENOUS at 10:51

## 2024-05-24 RX ADMIN — ACETAMINOPHEN 650 MG: 325 TABLET, FILM COATED ORAL at 10:51

## 2024-05-24 RX ADMIN — Medication 25 G: at 11:57

## 2024-05-24 RX ADMIN — DIPHENHYDRAMINE HYDROCHLORIDE 50 MG: 25 TABLET ORAL at 10:51

## 2024-05-24 NOTE — PROGRESS NOTES
Patient is a 29 YO  female presenting to the office at 32w3d for routine OB care.   Patient is feeling well today.     Placenta Previa dx on 1LTCS scheduled for 2024, if no improvement. US with MFM on 2024.   Hypothyroidism on synthroid 100mcg 5 days per week and 200mcg 2 days per week.  New diagnosis of gestational HTN due to two documented high BP in office, 143/84 on  at infusion and 142/80 on 5/10. Will be delivered at 37 weeks, do not need NST/MARTINA at this time.     Fetal heart rate: 140  BP: 116/72  TWlb  Fetal Movement: yes, good movement   LOF: no  VB: no  CTX: no  Reviewed precautions  Call for concerns  RTO 2 weeks

## 2024-05-24 NOTE — PROGRESS NOTES
Pt presents for IVIG offering no compliants. Pt tolerated treatment without incident. PIV removed. AVS declined. Next appointment reviewed on 5/25 at 9am.

## 2024-05-24 NOTE — PROGRESS NOTES
28 y.o.  female at 32w3d (Estimated Date of Delivery: 24) for PNV.      TW.2 kg (38 lb)    Leakage of fluid: no  Vaginal bleeding: no  Contractions/Cramping: yes, some for two days randomly.  Fetal movement: yes

## 2024-05-25 ENCOUNTER — HOSPITAL ENCOUNTER (INPATIENT)
Facility: HOSPITAL | Age: 29
LOS: 3 days | Discharge: HOME | End: 2024-05-29
Attending: OBSTETRICS & GYNECOLOGY | Admitting: OBSTETRICS & GYNECOLOGY
Payer: COMMERCIAL

## 2024-05-25 ENCOUNTER — HOSPITAL ENCOUNTER (OUTPATIENT)
Dept: INFUSION CENTER | Facility: HOSPITAL | Age: 29
Discharge: HOME | End: 2024-05-25
Attending: OBSTETRICS & GYNECOLOGY

## 2024-05-25 ENCOUNTER — HOSPITAL ENCOUNTER (EMERGENCY)
Facility: HOSPITAL | Age: 29
Discharge: OTHER INSTITUTION | End: 2024-05-25
Attending: EMERGENCY MEDICINE
Payer: COMMERCIAL

## 2024-05-25 VITALS
RESPIRATION RATE: 17 BRPM | TEMPERATURE: 97.2 F | HEART RATE: 99 BPM | OXYGEN SATURATION: 98 % | SYSTOLIC BLOOD PRESSURE: 146 MMHG | DIASTOLIC BLOOD PRESSURE: 64 MMHG

## 2024-05-25 DIAGNOSIS — D50.9 IRON DEFICIENCY ANEMIA DURING PREGNANCY: ICD-10-CM

## 2024-05-25 DIAGNOSIS — O99.019 IRON DEFICIENCY ANEMIA DURING PREGNANCY: ICD-10-CM

## 2024-05-25 DIAGNOSIS — Z3A.30 30 WEEKS GESTATION OF PREGNANCY: ICD-10-CM

## 2024-05-25 DIAGNOSIS — Z3A.32 32 WEEKS GESTATION OF PREGNANCY: ICD-10-CM

## 2024-05-25 DIAGNOSIS — O46.90 VAGINAL BLEEDING DURING PREGNANCY: Primary | ICD-10-CM

## 2024-05-25 DIAGNOSIS — Z98.891 STATUS POST PRIMARY LOW TRANSVERSE CESAREAN SECTION: Chronic | ICD-10-CM

## 2024-05-25 DIAGNOSIS — O44.03 PLACENTA PREVIA IN THIRD TRIMESTER: Primary | ICD-10-CM

## 2024-05-25 DIAGNOSIS — N93.9 VAGINAL BLEEDING: ICD-10-CM

## 2024-05-25 DIAGNOSIS — O44.00 PLACENTA PREVIA ANTEPARTUM: ICD-10-CM

## 2024-05-25 LAB
ABO GROUP BLD: NORMAL
ALBUMIN SERPL BCP-MCNC: 3.2 G/DL (ref 3.5–5)
ALP SERPL-CCNC: 110 U/L (ref 34–104)
ALT SERPL W P-5'-P-CCNC: 11 U/L (ref 7–52)
ANION GAP SERPL CALCULATED.3IONS-SCNC: 8 MMOL/L (ref 4–13)
APTT PPP: 24 SECONDS (ref 23–37)
AST SERPL W P-5'-P-CCNC: 17 U/L (ref 13–39)
BACTERIA UR QL AUTO: ABNORMAL /HPF
BASOPHILS # BLD AUTO: 0.03 THOUSANDS/ÂΜL (ref 0–0.1)
BASOPHILS NFR BLD AUTO: 0 % (ref 0–1)
BILIRUB SERPL-MCNC: 0.3 MG/DL (ref 0.2–1)
BILIRUB UR QL STRIP: NEGATIVE
BLD GP AB SCN SERPL QL: POSITIVE
BLOOD GROUP ANTIBODIES SERPL: NORMAL
BUN SERPL-MCNC: 6 MG/DL (ref 5–25)
CALCIUM ALBUM COR SERPL-MCNC: 9.3 MG/DL (ref 8.3–10.1)
CALCIUM SERPL-MCNC: 8.7 MG/DL (ref 8.4–10.2)
CHLORIDE SERPL-SCNC: 107 MMOL/L (ref 96–108)
CLARITY UR: CLEAR
CO2 SERPL-SCNC: 20 MMOL/L (ref 21–32)
COLOR UR: ABNORMAL
CREAT SERPL-MCNC: 0.5 MG/DL (ref 0.6–1.3)
EOSINOPHIL # BLD AUTO: 0.02 THOUSAND/ÂΜL (ref 0–0.61)
EOSINOPHIL NFR BLD AUTO: 0 % (ref 0–6)
ERYTHROCYTE [DISTWIDTH] IN BLOOD BY AUTOMATED COUNT: 21.2 % (ref 11.6–15.1)
ERYTHROCYTE [DISTWIDTH] IN BLOOD BY AUTOMATED COUNT: 21.2 % (ref 11.6–15.1)
FIBRINOGEN PPP-MCNC: 546 MG/DL (ref 207–520)
GFR SERPL CREATININE-BSD FRML MDRD: 134 ML/MIN/1.73SQ M
GLUCOSE SERPL-MCNC: 82 MG/DL (ref 65–140)
GLUCOSE UR STRIP-MCNC: NEGATIVE MG/DL
HCT VFR BLD AUTO: 32.5 % (ref 34.8–46.1)
HCT VFR BLD AUTO: 33.1 % (ref 34.8–46.1)
HGB BLD-MCNC: 10.3 G/DL (ref 11.5–15.4)
HGB BLD-MCNC: 10.7 G/DL (ref 11.5–15.4)
HGB UR QL STRIP.AUTO: ABNORMAL
HOLD SPECIMEN: NORMAL
IMM GRANULOCYTES # BLD AUTO: 0.2 THOUSAND/UL (ref 0–0.2)
IMM GRANULOCYTES NFR BLD AUTO: 2 % (ref 0–2)
INR PPP: 0.9 (ref 0.84–1.19)
KETONES UR STRIP-MCNC: NEGATIVE MG/DL
LEUKOCYTE ESTERASE UR QL STRIP: NEGATIVE
LYMPHOCYTES # BLD AUTO: 1.66 THOUSANDS/ÂΜL (ref 0.6–4.47)
LYMPHOCYTES NFR BLD AUTO: 16 % (ref 14–44)
MCH RBC QN AUTO: 28.2 PG (ref 26.8–34.3)
MCH RBC QN AUTO: 28.7 PG (ref 26.8–34.3)
MCHC RBC AUTO-ENTMCNC: 31.7 G/DL (ref 31.4–37.4)
MCHC RBC AUTO-ENTMCNC: 32.3 G/DL (ref 31.4–37.4)
MCV RBC AUTO: 89 FL (ref 82–98)
MCV RBC AUTO: 89 FL (ref 82–98)
MONOCYTES # BLD AUTO: 0.93 THOUSAND/ÂΜL (ref 0.17–1.22)
MONOCYTES NFR BLD AUTO: 9 % (ref 4–12)
NEUTROPHILS # BLD AUTO: 7.55 THOUSANDS/ÂΜL (ref 1.85–7.62)
NEUTS SEG NFR BLD AUTO: 73 % (ref 43–75)
NITRITE UR QL STRIP: NEGATIVE
NON-SQ EPI CELLS URNS QL MICRO: ABNORMAL /HPF
NRBC BLD AUTO-RTO: 0 /100 WBCS
PH UR STRIP.AUTO: 7 [PH]
PLATELET # BLD AUTO: 189 THOUSANDS/UL (ref 149–390)
PLATELET # BLD AUTO: 202 THOUSANDS/UL (ref 149–390)
PMV BLD AUTO: 11.1 FL (ref 8.9–12.7)
PMV BLD AUTO: 11.3 FL (ref 8.9–12.7)
POTASSIUM SERPL-SCNC: 3.8 MMOL/L (ref 3.5–5.3)
PROT SERPL-MCNC: 6.7 G/DL (ref 6.4–8.4)
PROT UR STRIP-MCNC: NEGATIVE MG/DL
PROTHROMBIN TIME: 12.8 SECONDS (ref 11.6–14.5)
RBC # BLD AUTO: 3.65 MILLION/UL (ref 3.81–5.12)
RBC # BLD AUTO: 3.73 MILLION/UL (ref 3.81–5.12)
RBC #/AREA URNS AUTO: ABNORMAL /HPF
RH BLD: NEGATIVE
SODIUM SERPL-SCNC: 135 MMOL/L (ref 135–147)
SP GR UR STRIP.AUTO: 1.01 (ref 1–1.03)
SPECIMEN EXPIRATION DATE: NORMAL
UROBILINOGEN UR STRIP-ACNC: <2 MG/DL
WBC # BLD AUTO: 10.39 THOUSAND/UL (ref 4.31–10.16)
WBC # BLD AUTO: 9.34 THOUSAND/UL (ref 4.31–10.16)
WBC #/AREA URNS AUTO: ABNORMAL /HPF

## 2024-05-25 PROCEDURE — 4A1HXCZ MONITORING OF PRODUCTS OF CONCEPTION, CARDIAC RATE, EXTERNAL APPROACH: ICD-10-PCS | Performed by: OBSTETRICS & GYNECOLOGY

## 2024-05-25 PROCEDURE — 87086 URINE CULTURE/COLONY COUNT: CPT

## 2024-05-25 PROCEDURE — NC001 PR NO CHARGE: Performed by: OBSTETRICS & GYNECOLOGY

## 2024-05-25 PROCEDURE — 86901 BLOOD TYPING SEROLOGIC RH(D): CPT

## 2024-05-25 PROCEDURE — 85027 COMPLETE CBC AUTOMATED: CPT

## 2024-05-25 PROCEDURE — 86921 COMPATIBILITY TEST INCUBATE: CPT

## 2024-05-25 PROCEDURE — 85384 FIBRINOGEN ACTIVITY: CPT

## 2024-05-25 PROCEDURE — 86780 TREPONEMA PALLIDUM: CPT

## 2024-05-25 PROCEDURE — 99214 OFFICE O/P EST MOD 30 MIN: CPT

## 2024-05-25 PROCEDURE — 86850 RBC ANTIBODY SCREEN: CPT

## 2024-05-25 PROCEDURE — 86922 COMPATIBILITY TEST ANTIGLOB: CPT

## 2024-05-25 PROCEDURE — 80053 COMPREHEN METABOLIC PANEL: CPT | Performed by: EMERGENCY MEDICINE

## 2024-05-25 PROCEDURE — 76815 OB US LIMITED FETUS(S): CPT | Performed by: EMERGENCY MEDICINE

## 2024-05-25 PROCEDURE — 85610 PROTHROMBIN TIME: CPT

## 2024-05-25 PROCEDURE — 85730 THROMBOPLASTIN TIME PARTIAL: CPT

## 2024-05-25 PROCEDURE — 99285 EMERGENCY DEPT VISIT HI MDM: CPT | Performed by: EMERGENCY MEDICINE

## 2024-05-25 PROCEDURE — 85025 COMPLETE CBC W/AUTO DIFF WBC: CPT | Performed by: EMERGENCY MEDICINE

## 2024-05-25 PROCEDURE — G0379 DIRECT REFER HOSPITAL OBSERV: HCPCS

## 2024-05-25 PROCEDURE — 81001 URINALYSIS AUTO W/SCOPE: CPT

## 2024-05-25 PROCEDURE — 96360 HYDRATION IV INFUSION INIT: CPT

## 2024-05-25 PROCEDURE — 86870 RBC ANTIBODY IDENTIFICATION: CPT

## 2024-05-25 PROCEDURE — 86900 BLOOD TYPING SEROLOGIC ABO: CPT

## 2024-05-25 PROCEDURE — 36415 COLL VENOUS BLD VENIPUNCTURE: CPT | Performed by: EMERGENCY MEDICINE

## 2024-05-25 PROCEDURE — 99284 EMERGENCY DEPT VISIT MOD MDM: CPT

## 2024-05-25 RX ORDER — CALCIUM CARBONATE 500 MG/1
1000 TABLET, CHEWABLE ORAL DAILY PRN
Status: DISCONTINUED | OUTPATIENT
Start: 2024-05-25 | End: 2024-05-26

## 2024-05-25 RX ORDER — ASPIRIN 81 MG/1
162 TABLET, CHEWABLE ORAL
Status: DISCONTINUED | OUTPATIENT
Start: 2024-05-25 | End: 2024-05-26

## 2024-05-25 RX ORDER — DOCUSATE SODIUM 100 MG/1
100 CAPSULE, LIQUID FILLED ORAL 2 TIMES DAILY
Status: DISCONTINUED | OUTPATIENT
Start: 2024-05-25 | End: 2024-05-26

## 2024-05-25 RX ORDER — NIFEDIPINE 10 MG/1
10 CAPSULE ORAL EVERY 8 HOURS SCHEDULED
Status: DISCONTINUED | OUTPATIENT
Start: 2024-05-25 | End: 2024-05-26

## 2024-05-25 RX ORDER — LEVOTHYROXINE SODIUM 0.1 MG/1
100 TABLET ORAL
Status: DISCONTINUED | OUTPATIENT
Start: 2024-05-26 | End: 2024-05-26

## 2024-05-25 RX ORDER — PANTOPRAZOLE SODIUM 40 MG/1
40 TABLET, DELAYED RELEASE ORAL
Status: DISCONTINUED | OUTPATIENT
Start: 2024-05-26 | End: 2024-05-29 | Stop reason: HOSPADM

## 2024-05-25 RX ORDER — SIMETHICONE 80 MG
80 TABLET,CHEWABLE ORAL 4 TIMES DAILY PRN
Status: DISCONTINUED | OUTPATIENT
Start: 2024-05-25 | End: 2024-05-26

## 2024-05-25 RX ORDER — BETAMETHASONE SODIUM PHOSPHATE AND BETAMETHASONE ACETATE 3; 3 MG/ML; MG/ML
12 INJECTION, SUSPENSION INTRA-ARTICULAR; INTRALESIONAL; INTRAMUSCULAR; SOFT TISSUE EVERY 24 HOURS
Status: DISCONTINUED | OUTPATIENT
Start: 2024-05-25 | End: 2024-05-26

## 2024-05-25 RX ORDER — ONDANSETRON 2 MG/ML
4 INJECTION INTRAMUSCULAR; INTRAVENOUS EVERY 8 HOURS PRN
Status: DISCONTINUED | OUTPATIENT
Start: 2024-05-25 | End: 2024-05-26

## 2024-05-25 RX ORDER — SODIUM CHLORIDE, SODIUM LACTATE, POTASSIUM CHLORIDE, CALCIUM CHLORIDE 600; 310; 30; 20 MG/100ML; MG/100ML; MG/100ML; MG/100ML
100 INJECTION, SOLUTION INTRAVENOUS CONTINUOUS
Status: DISCONTINUED | OUTPATIENT
Start: 2024-05-25 | End: 2024-05-26

## 2024-05-25 RX ORDER — LEVOCETIRIZINE DIHYDROCHLORIDE 5 MG/1
1 TABLET, FILM COATED ORAL
COMMUNITY

## 2024-05-25 RX ORDER — ACETAMINOPHEN 325 MG/1
650 TABLET ORAL EVERY 4 HOURS PRN
Status: DISCONTINUED | OUTPATIENT
Start: 2024-05-25 | End: 2024-05-26

## 2024-05-25 RX ORDER — ALBUTEROL SULFATE 90 UG/1
2 AEROSOL, METERED RESPIRATORY (INHALATION) EVERY 4 HOURS PRN
Status: DISCONTINUED | OUTPATIENT
Start: 2024-05-25 | End: 2024-05-29 | Stop reason: HOSPADM

## 2024-05-25 RX ORDER — HUMAN IMMUNOGLOBULIN G 0.2 G/ML
14 LIQUID SUBCUTANEOUS WEEKLY
Status: DISCONTINUED | OUTPATIENT
Start: 2024-05-28 | End: 2024-05-26

## 2024-05-25 RX ADMIN — NIFEDIPINE 10 MG: 10 CAPSULE ORAL at 21:16

## 2024-05-25 RX ADMIN — SODIUM CHLORIDE, SODIUM LACTATE, POTASSIUM CHLORIDE, AND CALCIUM CHLORIDE 100 ML/HR: .6; .31; .03; .02 INJECTION, SOLUTION INTRAVENOUS at 17:05

## 2024-05-25 RX ADMIN — RHO(D) IMMUNE GLOBULIN (HUMAN) 300 MCG: 1500 SOLUTION INTRAMUSCULAR at 15:39

## 2024-05-25 RX ADMIN — DOCUSATE SODIUM 100 MG: 100 CAPSULE, LIQUID FILLED ORAL at 13:21

## 2024-05-25 RX ADMIN — BETAMETHASONE SODIUM PHOSPHATE AND BETAMETHASONE ACETATE 12 MG: 3; 3 INJECTION, SUSPENSION INTRA-ARTICULAR; INTRALESIONAL; INTRAMUSCULAR at 13:22

## 2024-05-25 RX ADMIN — SODIUM CHLORIDE 1000 ML: 0.9 INJECTION, SOLUTION INTRAVENOUS at 10:05

## 2024-05-25 RX ADMIN — SODIUM CHLORIDE, SODIUM LACTATE, POTASSIUM CHLORIDE, AND CALCIUM CHLORIDE 1000 ML: .6; .31; .03; .02 INJECTION, SOLUTION INTRAVENOUS at 12:55

## 2024-05-25 RX ADMIN — ACETAMINOPHEN 650 MG: 325 TABLET, FILM COATED ORAL at 21:16

## 2024-05-25 NOTE — PLAN OF CARE
Problem: PAIN - ADULT  Goal: Verbalizes/displays adequate comfort level or baseline comfort level  Description: Interventions:  - Encourage patient to monitor pain and request assistance  - Assess pain using appropriate pain scale  - Administer analgesics based on type and severity of pain and evaluate response  - Implement non-pharmacological measures as appropriate and evaluate response  - Consider cultural and social influences on pain and pain management  - Notify physician/advanced practitioner if interventions unsuccessful or patient reports new pain  Outcome: Progressing     Problem: INFECTION - ADULT  Goal: Absence or prevention of progression during hospitalization  Description: INTERVENTIONS:  - Assess and monitor for signs and symptoms of infection  - Monitor lab/diagnostic results  - Monitor all insertion sites, i.e. indwelling lines, tubes, and drains  - Monitor endotracheal if appropriate and nasal secretions for changes in amount and color  - [place] appropriate cooling/warming therapies per order  - Administer medications as ordered  - Instruct and encourage patient and family to use good hand hygiene technique  - Identify and instruct in appropriate isolation precautions for identified infection/condition  Outcome: Progressing  Goal: Absence of fever/infection during neutropenic period  Description: INTERVENTIONS:  - Monitor WBC    Outcome: Progressing     Problem: SAFETY ADULT  Goal: Patient will remain free of falls  Description: INTERVENTIONS:  - Educate patient/family on patient safety including physical limitations  - Instruct patient to call for assistance with activity   - Consult OT/PT to assist with strengthening/mobility   - Keep Call bell within reach  - Keep bed low and locked with side rails adjusted as appropriate  - Keep care items and personal belongings within reach  - Initiate and maintain comfort rounds    Outcome: Progressing  Goal: Maintain or return to baseline ADL  function  Description: INTERVENTIONS:  -  Assess patient's ability to carry out ADLs; assess patient's baseline for ADL function and identify physical deficits which impact ability to perform ADLs (bathing, care of mouth/teeth, toileting, grooming, dressing, etc.)  - Assess/evaluate cause of self-care deficits   - Assess range of motion  - Assess patient's mobility; develop plan if impaired  - Assess patient's need for assistive devices and provide as appropriate  - Encourage maximum independence but intervene and supervise when necessary  - Involve family in performance of ADLs  - Assess for home care needs following discharge   - Consider OT consult to assist with ADL evaluation and planning for discharge  - Provide patient education as appropriate  Outcome: Progressing  Goal: Maintains/Returns to pre admission functional level  Description: INTERVENTIONS:  - Perform AM-PAC 6 Click Basic Mobility/ Daily Activity assessment daily.  - Set and communicate daily mobility goal to care team and patient/family/caregiver.   - Collaborate with rehabilitation services on mobility goals if consulted    - Out of bed for toileting  - Record patient progress and toleration of activity level   Outcome: Progressing     Problem: DISCHARGE PLANNING  Goal: Discharge to home or other facility with appropriate resources  Description: INTERVENTIONS:  - Identify barriers to discharge w/patient and caregiver  - Arrange for needed discharge resources and transportation as appropriate  - Identify discharge learning needs (meds, wound care, etc.)  - Arrange for interpretive services to assist at discharge as needed  - Refer to Case Management Department for coordinating discharge planning if the patient needs post-hospital services based on physician/advanced practitioner order or complex needs related to functional status, cognitive ability, or social support system  Outcome: Progressing     Problem: Knowledge Deficit  Goal:  Patient/family/caregiver demonstrates understanding of disease process, treatment plan, medications, and discharge instructions  Description: Complete learning assessment and assess knowledge base.  Interventions:  - Provide teaching at level of understanding  - Provide teaching via preferred learning methods  Outcome: Progressing     Problem: ANTEPARTUM  Goal: Maintain pregnancy as long as maternal and/or fetal condition is stable  Description: INTERVENTIONS:  - Maternal surveillance  - Fetal surveillance  - Monitor uterine activity  - Medications as ordered  - Bedrest  Outcome: Progressing     Problem: COPING  Goal: Pt/Family able to verbalize concerns and demonstrate effective coping strategies  Description: INTERVENTIONS:  - Assist patient/family to identify coping skills, available support systems and cultural and spiritual values  - Provide emotional support, including active listening and acknowledgement of concerns of patient and caregivers  - Reduce environmental stimuli, as able  - Provide patient education  - Assess for spiritual pain/suffering and initiate spiritual care, including notification of Pastoral Care or jonas based community as needed  - Assess effectiveness of coping strategies  Outcome: Progressing  Goal: Will report anxiety at manageable levels  Description: INTERVENTIONS:  - Administer medication as ordered  - Teach and encourage coping skills  - Provide emotional support  - Assess patient/family for anxiety and ability to cope  Outcome: Progressing     Problem: DECISION MAKING  Goal: Pt/Family able to effectively weigh alternatives and participate in decision making related to treatment and care  Description: INTERVENTIONS:  - Identify decision maker  - Determine when there are differences among patient's view, family's view, and healthcare provider's view of patient condition and care goals  - Facilitate patient/family articulation of goals for care  - Help patient/family identify  pros/cons of alternative solutions  - Provide information as requested by patient/family  - Respect patient/family rights related to privacy and sharing information   - Serve as a liaison between patient, family and health care team  - Initiate consults as appropriate (Ethics Team, Palliative Care, Family Care Conference, etc.)  Outcome: Progressing     Problem: SPIRITUAL CARE  Goal: Pt/Family able to move forward in process of forgiving self, others and/or higher power  Description: INTERVENTIONS:  - Assist patient with any spiritual needs/requests such as communion, confession, anointing, etc  - Explore guilt and help patient/family identify possible spiritual/cultural beliefs and values  - Explore possibilities of making amends & reconciliation with self, others, and/or a greater power  - Guide patient/family in identifying painful feelings  - Help patient explore and identify spiritual beliefs, cultural understandings or values that may help or hinder letting go of issue  - Help patient explore feelings of anger, bitterness, resentment, anxiety   Help patient/family identify and examine the situation in which these feelings are experienced  - Help patient/family identify destructive displacement of feelings onto other individuals  - Refer patient to formal counseling and/or to jonas community for further support as needed or per request  Outcome: Progressing  Goal: Patient feels balance and connection with others and/or higher power that empowers the self during times of loss, guilt and fear  Description: INTERVENTIONS:  - Create safety for patient through empathic presence and non-judgmental listening  - Encourage patient to explore his/her values, beliefs and/or spiritual images and practices  - Encourage use of breath work, imagery, meditation, relaxation, reiki to ease distress and provide healing  - Encourage use of cultural and spiritual celebrations and rituals  - Facilitate discussion that helps patient  sort out spiritual concerns  - Help patient identify where meaning/hope/comfort & strength are in his/her life  - Refer patient to jonas community for assistance, as appropriate  - Respond to patient/family need for prayer/ritual/sacrament/ceremony  Outcome: Progressing

## 2024-05-25 NOTE — ASSESSMENT & PLAN NOTE
Present with second episode of vaginal bleeding in the setting of a known placenta previa.  Given nature and amount of bleeding, recommendation is for admission, possibly until delivery at [id number] (or sooner if fetal distress is present)  Inpatient admission  Labs: T&S, CBC, RPR, Coagulation studies; plan to follow up  NICU consult  Continuous monitoring  NPO

## 2024-05-25 NOTE — EMTALA/ACUTE CARE TRANSFER
[facility] EMERGENCY DEPARTMENT  [address] [address]  Dept: [phone]      EMTALA TRANSFER CONSENT    NAME Antony LÓPEZ 1995                              MRN [id number]    I have been informed of my rights regarding examination, treatment, and transfer   by Dr. Nadia Bearden MD    Benefits: Specialized equipment and/or services available at the receiving facility (Include comment)________________________ (OB specialty, labor and delivery unit)    Risks: Loss of IV, Potential deterioration of medical condition, Increased discomfort during transfer, Possible worsening of condition or death during transfer      Consent for Transfer:  I acknowledge that my medical condition has been evaluated and explained to me by the emergency department physician or other qualified medical person and/or my attending physician, who has recommended that I be transferred to the service of  Accepting Physician: Dr. Manda Mccauley at Accepting Facility Name, City & State : [facility]. The above potential benefits of such transfer, the potential risks associated with such transfer, and the probable risks of not being transferred have been explained to me, and I fully understand them.  The doctor has explained that, in my case, the benefits of transfer outweigh the risks.  I agree to be transferred.    I authorize the performance of emergency medical procedures and treatments upon me in both transit and upon arrival at the receiving facility.  Additionally, I authorize the release of any and all medical records to the receiving facility and request they be transported with me, if possible.  I understand that the safest mode of transportation during a medical emergency is an ambulance and that the Hospital advocates the use of this mode of transport. Risks of traveling to the receiving facility by car, including absence of medical  control, life sustaining equipment, such as oxygen, and medical personnel has been explained to me and I fully understand them.    (PAUL CORRECT BOX BELOW)  [  ]  I consent to the stated transfer and to be transported by ambulance/helicopter.  [  ]  I consent to the stated transfer, but refuse transportation by ambulance and accept full responsibility for my transportation by car.  I understand the risks of non-ambulance transfers and I exonerate the Hospital and its staff from any deterioration in my condition that results from this refusal.    X___________________________________________    DATE  24  TIME________  Signature of patient or legally responsible individual signing on patient behalf           RELATIONSHIP TO PATIENT_________________________          Provider Certification    NAME Antony Rodriguez                                         1995                              MRN [id number]    A medical screening exam was performed on the above named patient.  Based on the examination:    Condition Necessitating Transfer The encounter diagnosis was Vaginal bleeding during pregnancy.    Patient Condition: The patient has been stabilized such that within reasonable medical probability, no material deterioration of the patient condition or the condition of the unborn child(marialuisa) is likely to result from the transfer    Reason for Transfer: Level of Care needed not available at this facility    Transfer Requirements: [facility]   Space available and qualified personnel available for treatment as acknowledged by El[facility] [phone]  Agreed to accept transfer and to provide appropriate medical treatment as acknowledged by       Dr. Manda Mccauley  Appropriate medical records of the examination and treatment of the patient are provided at the time of transfer   STAFF INITIAL WHEN COMPLETED _______  Transfer will be performed by qualified personnel from [facility]  and appropriate transfer equipment  as required, including the use of necessary and appropriate life support measures.    Provider Certification: I have examined the patient and explained the following risks and benefits of being transferred/refusing transfer to the patient/family:  General risk, such as traffic hazards, adverse weather conditions, rough terrain or turbulence, possible failure of equipment (including vehicle or aircraft), or consequences of actions of persons outside the control of the transport personnel, Unanticipated needs of medical equipment and personnel during transport, Risk of worsening condition, The possibility of a transport vehicle being unavailable      Based on these reasonable risks and benefits to the patient and/or the unborn child(marialuisa), and based upon the information available at the time of the patient’s examination, I certify that the medical benefits reasonably to be expected from the provision of appropriate medical treatments at another medical facility outweigh the increasing risks, if any, to the individual’s medical condition, and in the case of labor to the unborn child, from effecting the transfer.    X____________________________________________ DATE 05/25/24        TIME_______      ORIGINAL - SEND TO MEDICAL RECORDS   COPY - SEND WITH PATIENT DURING TRANSFER

## 2024-05-25 NOTE — H&P
H&P Exam - Obstetrics   Antony Rodriguez 28 y.o. female MRN: [id number]  Unit/Bed#: LD TRIAGE 4 Encounter: [id number]      ASSESSMENT:  28 y.o.yo  at 32w4d weeks gestation who is being admitted for vaginal bleeding in the setting of placenta previa.  EFW: 1600g at 30w3d  VTX by TAUS    PLAN:    Rh negative state in antepartum period, third trimester  Assessment & Plan  Received Rhogam at 28w  Recommend additional dose of rhogam on admission    32 weeks gestation of pregnancy  Assessment & Plan  Up to date with prenatal care  O neg, GBS unk  Abnormal 1hr GTT with Nml 3hr GTT    Placenta previa in third trimester  Assessment & Plan  Present with second episode of vaginal bleeding in the setting of a known placenta previa.  Given nature and amount of bleeding, recommendation is for admission, possibly until delivery at [id number] (or sooner if fetal distress is present)  Inpatient admission  Labs: T&S, CBC, RPR, Coagulation studies; plan to follow up  NICU and M consults  Continuous monitoring  NPO  Recommend betamethasone    Crohn's disease of colon without complication (HCC)  Assessment & Plan  Weekly home Humira for crohn's disease    CVID (common variable immunodeficiency) (HCC)  Assessment & Plan  Weekly hizentra    Iron deficiency anemia  Assessment & Plan  Receives twice weekly venofer infusions  Continue while inpatient.     Hypothyroidism due to Hashimoto's thyroiditis  Assessment & Plan  Continue home levothyroxine    Asthma, moderate persistent  Assessment & Plan  Takes Budesonide inhaler  Avoid hemabate if patient were to hemorrhage      Discussed with Dr. Khalil      This patient will be an INPATIENT  and I certify the anticipated length of stay is >2 Midnights.      History of Present Illness     Chief Complaint: Vaginal bleeding    HPI:  Antony Rodriguez is a 28 y.o.  female with an RAJIV of 2024, by Last Menstrual Period at 32w4d weeks gestation who is being admitted for vaginal  bleeding in the setting of placenta previa.    Contractions: irregular  Loss of fluid: no  Vaginal bleeding: yes  Fetal movement: yes    She is [facility] patient.     PREGNANCY COMPLICATIONS:   1) Pregnancy at 32w4d  2) Placenta previa  3) Anemia  4) Rh negative  5) Hypothyroidism  6) Crohn's Disease  7) Common variable immunodeficiency  8) Asthma    OB History    Para Term  AB Living   1 0 0 0 0 0   SAB IAB Ectopic Multiple Live Births   0 0 0 0 0      # Outcome Date GA Lbr Jonnathan/2nd Weight Sex Type Anes PTL Lv   1 Current               Obstetric Comments   Menarche 16       Baby complications/comments:     Vertex presentation  Fetal growth appeared normal  Placenta Location = Posterior  Placenta previa     MEASUREMENTS (* Included In Average GA)     AC             27.05 cm        31 weeks 1 day * (67%)  BPD             7.45 cm        29 weeks 6 days* (23%)  HC             28.92 cm        31 weeks 6 days* (54%)  Femur           5.60 cm        29 weeks 4 days* (13%)     HC/AC           1.07 [0.99 - 1.21]                 (44%)  FL/AC             21 [20 - 24]  FL/BPD            75 [71 - 87]  EFW [place] 4   1600 grams - 3 lbs 8 oz                 (44%)    Review of Systems   Constitutional:  Negative for chills and fever.   HENT:  Negative for ear pain and sore throat.    Eyes:  Negative for pain and visual disturbance.   Respiratory:  Negative for cough and shortness of breath.    Cardiovascular:  Negative for chest pain and palpitations.   Gastrointestinal:  Positive for abdominal pain. Negative for nausea and vomiting.   Genitourinary:  Positive for vaginal bleeding. Negative for dysuria and hematuria.   Musculoskeletal:  Negative for arthralgias and back pain.   Skin:  Negative for color change and rash.   Neurological:  Negative for seizures and syncope.   All other systems reviewed and are negative.        Historical Information   Past Medical History:   Diagnosis Date    Anemia     Anxiety     Asthma      "Colitis, chronic, ulcerative (HCC)     Disease of thyroid gland     Endometriosis     GERD (gastroesophageal reflux disease)     Hashimoto's disease     Heartburn     Hyperthyroidism     Immune deficiency disorder (HCC)     Irregular menses 08/26/2022    Migraine     Palpitations 12/20/2022    Pneumonia     Rash     Ulcerative colitis (HCC)     crohn's disease, takes Humara weekly    Urinary tract infection     Urticaria     Varicella     disease as child and vaccinated     Past Surgical History:   Procedure Laterality Date    ADENOIDECTOMY      COLONOSCOPY      COLPOSCOPY      EAR SURGERY Bilateral     Tubes    MEDIPORT INSERTION, SINGLE      x2    MOLE REMOVAL       Social History   Social History     Substance and Sexual Activity   Alcohol Use Yes    Comment: Occasional     Social History     Substance and Sexual Activity   Drug Use Never     Social History     Tobacco Use   Smoking Status Never   Smokeless Tobacco Never     Family History: non-contributory    Meds/Allergies      Medications Prior to Admission:     Adalimumab 40 MG/0.4ML PNKT    aspirin 81 mg chewable tablet    cholecalciferol (VITAMIN D3) 1,000 units tablet    Immune Globulin, Human, (Hizentra) 10 GM/50ML SOLN    levocetirizine (XYZAL) 5 MG tablet    omeprazole (PriLOSEC) 40 MG capsule    Prenatal-FeFum-FA-DHA w/o A (PRENATAL + DHA PO)    Synthroid 100 MCG tablet    albuterol (PROVENTIL HFA,VENTOLIN HFA) 90 mcg/act inhaler    budesonide (Pulmicort Flexhaler) 90 MCG/ACT inhaler    Immune Globulin, Human, (HIZENTRA SC)    Multiple Vitamin (MULTIVITAMIN) tablet     Allergies   Allergen Reactions    Bee Venom Anaphylaxis     breathing  breathing      Cinnamon - Food Allergy Anaphylaxis     Lab test 0.  Per pt she has tolerated many times by accident, although has not had formal food challenge by allergy    Albuterol Tachycardia       OBJECTIVE:    Vitals: Blood pressure 133/78, pulse 84, resp. rate 18, height 5' 6\" (1.676 m), weight 125 kg (275 lb), " "last menstrual period 10/10/2023, SpO2 100%.Body mass index is 44.39 kg/m².    Physical Exam  Vitals reviewed.   Constitutional:       Appearance: Normal appearance.   HENT:      Head: Normocephalic and atraumatic.   Eyes:      Extraocular Movements: Extraocular movements intact.   Cardiovascular:      Pulses: Normal pulses.   Pulmonary:      Effort: Pulmonary effort is normal. No respiratory distress.   Abdominal:      Palpations: Abdomen is soft.      Tenderness: There is no abdominal tenderness.      Comments: Gravid uterus   Genitourinary:     General: Normal vulva.      Comments: Normal appearing external genitalia, nulliparous cervix appearing approx 1cm dilated, moderate dark blood in vaginal vault    Musculoskeletal:         General: Normal range of motion.      Cervical back: Normal range of motion.   Skin:     General: Skin is warm and dry.   Neurological:      Mental Status: She is alert.   Psychiatric:         Mood and Affect: Mood normal.         Behavior: Behavior normal.         Cervix:  Cervical exam deferred  CL >4cm, dilated on TVUS from 0.5 to 1cm    Fetal heart rate:   Baseline Rate (FHR): 140 bpm  Variability: Moderate  Accelerations: 15 x 15 or greater, At variable times  Decelerations: None  FHR Category: Category I    [place]:   Contraction Frequency (minutes): 0    GBS: unknown    Prenatal Labs: I have personally reviewed pertinent reports.  , Blood Type:   Lab Results   Component Value Date/Time    ABO Grouping O 05/25/2024 01:14 PM     , D (Rh type):   Lab Results   Component Value Date/Time    Rh Factor Negative 05/25/2024 01:14 PM     , Antibody Screen: No results found for: \"ANTIBODYSCR\" , HCT/HGB:   Lab Results   Component Value Date/Time    Hematocrit 32.5 (L) 05/25/2024 01:14 PM    Hemoglobin 10.3 (L) 05/25/2024 01:14 PM      , MCV:   Lab Results   Component Value Date/Time    MCV 89 05/25/2024 01:14 PM      , Platelets:   Lab Results   Component Value Date/Time    Platelets 189 " "05/25/2024 01:14 PM      , 1 hour Glucola:   Lab Results   Component Value Date/Time    Glucose 142 (H) 04/24/2024 01:02 PM   , 3 hour GTT:   Lab Results   Component Value Date/Time    Glucose, GTT - 3 Hour 92 05/04/2024 07:29 AM   , Varicella:   Lab Results   Component Value Date/Time    Varicella IgG IMMUNE 01/12/2024 04:11 PM       , Rubella:   Lab Results   Component Value Date/Time    Rubella IgG Quant 35.6 01/12/2024 04:11 PM        , VDRL/RPR: No results found for: \"RPR\"   , Urine Culture/Screen: No results found for: \"URINECX\"    , Urine Drug Screen: No results found for: \"AMPHETUR\", \"BARBTUR\", \"BDZUR\", \"THCUR\", \"COCAINEUR\", \"METHADONEUR\", \"OPIATEUR\", \"PCPUR\", \"MTHAMUR\", \"ECSTASYUR\", \"TRICYCLICSUR\", Hep B:   Lab Results   Component Value Date/Time    Hepatitis B Surface Ag Non-reactive 01/12/2024 04:11 PM     , Hep C: No components found for: \"HEPCSAG\", \"EXTHEPCSAG\"   , HIV: No results found for: \"HIVAGAB\"  , Chlamydia: No results found for: \"EXTCHLAMYDIA\"  , Gonorrhea:   Lab Results   Component Value Date/Time    N gonorrhoeae, DNA Probe Negative 01/17/2024 03:30 PM     , Group B Strep:  No results found for: \"STREPGRPB\"       Invasive Devices       Peripheral Intravenous Line  Duration             Peripheral IV 05/25/24 Left Antecubital <1 day                  Pat Abdul MD  OBGYN PGY-3  5/25/2024  12:27 PM  "

## 2024-05-25 NOTE — ASSESSMENT & PLAN NOTE
Can discontinue outpatient iron infusions.   Start iron supplement every other. Discussion precautions for constipation.

## 2024-05-25 NOTE — PROGRESS NOTES
Patient arrives to infusion department for venofer infusion.  Patient requested to use restroom  prior to start of infusion.  Upon exiting the restroom, patient states she is actively bleeding, patient is also 32 weeks pregnant.   Patient taken to ED in wheelchair at this time for further evaluation of symptoms.  Patient accompanied to ED by infusion tech and primary RN.     Patient is aware of next infusion appointment scheduled on  06/01/2024 at 9:30 am.

## 2024-05-25 NOTE — ED ATTENDING ATTESTATION
2024  I, Nadia Bearden MD, saw and evaluated the patient. I have discussed the patient with the resident/non-physician practitioner and agree with the resident's/non-physician practitioner's findings, Plan of Care, and MDM as documented in the resident's/non-physician practitioner's note, except where noted. All available labs and Radiology studies were reviewed.  I was present for key portions of any procedure(s) performed by the resident/non-physician practitioner and I was immediately available to provide assistance.       At this point I agree with the current assessment done in the Emergency Department.  I have conducted an independent evaluation of this patient a history and physical is as follows:    This is evaluation of a 28-year-old G1, P0 at approximately 32 weeks gestation who presents to the emergency department with vaginal bleeding.  Patient was upstairs receiving an iron infusion when she felt the urge to urinate.  When she went to the bathroom she noted that she was bleeding through her underwear.  Continues to feel bleeding.  Denies any passage of clots.  Has had good fetal movements.  No lightheadedness or dizziness.  No chest pain no shortness of breath.  No abdominal or back cramping.  Has a known history of known placenta previa.  Also known history of Crohn's disease, Hashimoto's thyroiditis and common variable immunodeficiency.  Patient was in the hospital today receiving an iron transfusion when symptoms began.  Otherwise denies any falls trauma or recent intercourse.  Has been feeling well.  On exam patient is nontoxic-appearing.  Vital signs currently stable.  HEENT is normocephalic and atraumatic with moist mucous membranes, clear sclera and conjunctive.  Neck is supple full range of motion.  Heart is regular rate.  Lungs are clear.  Abdomen is gravid.  Nontender to palpation.  Intact pulses.  Awake alert oriented.  Assessment and plan 28-year-old female  at 32 weeks  gestation with known placenta previous currently bleeding. Discussed with Dr. Olvera, will transfer to [place]. Place IVs, check basic blood work, monitor closely.     Bedside u/s shows FHTs 130s-160s, gyn exam deferred given known placenta previa and bleeding    ED Course     Per chart review from 510 patient received RhoGAM on 510 after an episode of spotting.  Patient is Rh-.    09:29  Conversation via TT with ob/gyn, no ob in-house. Transfer to [place]. Pt agreeable. Dr. Samuel speaking with PACS for transfer. No additional requests for treatment from ob/gyn at this time        Critical Care Time  Procedures

## 2024-05-25 NOTE — CONSULTS
Consultation - Maternal Fetal Medicine   Antony Rodriguez 28 y.o. female MRN: [id number]  Unit/Bed#: LD TRIAGE 4 Encounter: [id number]  Admit date: 2024.  Today's date: 24    Assessment & Plan   Ms. Rodriguez is a 28 y.o. year-old  at 32w4d, Hospital Day: 1, admitted for vaginal bleeding in the setting of placenta previa.  By issue:    Rh negative state in antepartum period, third trimester  Assessment & Plan  Received Rhogam at 28w  Recommend additional dose of rhogam on admission    32 weeks gestation of pregnancy  Assessment & Plan  Up to date with prenatal care  O neg, GBS unk  Abnormal 1hr GTT with Nml 3hr GTT    Placenta previa in third trimester  Assessment & Plan  Present with second episode of vaginal bleeding in the setting of a known placenta previa.  Given nature and amount of bleeding, recommendation is for admission, possibly until delivery at 33j8d-14x8k (or sooner if fetal distress is present)  Inpatient admission  Labs: T&S, CBC, RPR, Coagulation studies; plan to follow up  NICU consult  Continuous monitoring  NPO    Crohn's disease of colon without complication (HCC)  Assessment & Plan  Weekly home Humira for crohn's disease    CVID (common variable immunodeficiency) (HCC)  Assessment & Plan  Weekly hizentra    Iron deficiency anemia  Assessment & Plan  Receives twice weekly venofer infusions  Continue while inpatient.     Hypothyroidism due to Hashimoto's thyroiditis  Assessment & Plan  Continue home levothyroxine    Asthma, moderate persistent  Assessment & Plan  Takes Budesonide inhaler  Avoid hemabate if patient were to hemorrhage           Chief Complaint   Patient presents with    Vaginal Bleeding     Bright red bleeding        Physician Requesting Consult: Diana Khalil DO  Reason for Consult / Principal Problem: Bleeding  Subspeciality: Perinatology    Antony Rodriguez is a 28 y.o. year-old  with an RAJIV of Estimated Date of Delivery: 24 at 32w4d, [facility]  Day: 1, admitted for vaginal bleeding in the setting of known placenta previa. She was receiving her iron infusions at [facility] outpatient infusion center. After her infusion, she noted vaginal bleeding and bled onto her pants. She endorses min to moderate amount of blood. This is her second vaginal bleed episode. She endorses irregular cramping. Denies no other LOF or decreased FM.     I did a transvaginal US which showed she's about 0.5-1cm dilated, >4cm CL. FHT is reactive, order rhogam, collect GBS, NICU and MFM consul    Other obstetric review of symptoms:  Contractions: intermittent, not currently .    Leakage of fluid: None.    Bleeding: moderate dark red blood   Fetal movement: present.      Review of Systems   Constitutional:  Negative for chills and fever.   HENT:  Negative for ear pain and sore throat.    Eyes:  Negative for pain and visual disturbance.   Respiratory:  Negative for cough and shortness of breath.    Cardiovascular:  Negative for chest pain and palpitations.   Gastrointestinal:  Positive for abdominal pain. Negative for nausea and vomiting.        Irregular contractions   Genitourinary:  Positive for vaginal bleeding. Negative for dysuria and hematuria.   Musculoskeletal:  Negative for arthralgias and back pain.   Skin:  Negative for color change and rash.   Neurological:  Negative for seizures and syncope.   All other systems reviewed and are negative.    Other history is as follows:    Historical Information   OB History    Para Term  AB Living   1 0 0 0 0 0   SAB IAB Ectopic Multiple Live Births   0 0 0 0 0      # Outcome Date GA Lbr Jonnathan/2nd Weight Sex Type Anes PTL Lv   1 Current               Obstetric Comments   Menarche 16     Gynecologic history: Patient's last menstrual period was 10/10/2023 (exact date).     Past Medical History:   Diagnosis Date    Anemia     Anxiety     Asthma     Colitis, chronic, ulcerative (HCC)     Disease of thyroid gland     Endometriosis     GERD  (gastroesophageal reflux disease)     Hashimoto's disease     Heartburn     Hyperthyroidism     Immune deficiency disorder (HCC)     Irregular menses 08/26/2022    Migraine     Palpitations 12/20/2022    Pneumonia     Rash     Ulcerative colitis (HCC)     crohn's disease, takes Humara weekly    Urinary tract infection     Urticaria     Varicella     disease as child and vaccinated     Past Surgical History:   Procedure Laterality Date    ADENOIDECTOMY      COLONOSCOPY      COLPOSCOPY      EAR SURGERY Bilateral     Tubes    MEDIPORT INSERTION, SINGLE      x2    MOLE REMOVAL       Social History   Social History     Substance and Sexual Activity   Alcohol Use Yes    Comment: Occasional     Social History     Substance and Sexual Activity   Drug Use Never     Social History     Tobacco Use   Smoking Status Never   Smokeless Tobacco Never     Family History: non-contributory    Meds/Allergies   Prior to Admission Medications   Prescriptions Last Dose Informant Patient Reported? Taking?   Adalimumab 40 MG/0.4ML PNKT 5/24/2024 Self No Yes   Sig: Inject 40 mg under the skin every 7 days Maintenance dose.   Immune Globulin, Human, (HIZENTRA SC)  Self Yes No   Sig: Inject 1 Dose under the skin once a week   Immune Globulin, Human, (Hizentra) 10 GM/50ML SOLN Past Week Self No Yes   Sig: Inject 14 g under the skin once a week   Multiple Vitamin (MULTIVITAMIN) tablet  Self Yes No   Sig: Take 1 tablet by mouth daily   Prenatal-FeFum-FA-DHA w/o A (PRENATAL + DHA PO) 5/24/2024  Yes Yes   Sig: Take by mouth in the morning   Synthroid 100 MCG tablet 5/25/2024 Self No Yes   Sig: Take 1 tablet 5 days a week and 2 tabs 2 days a week   albuterol (PROVENTIL HFA,VENTOLIN HFA) 90 mcg/act inhaler  Self No No   Sig: Inhale 2 puffs every 4 (four) hours as needed for wheezing   aspirin 81 mg chewable tablet 5/24/2024  No Yes   Sig: Chew 2 tablets (162 mg total) daily at bedtime   budesonide (Pulmicort Flexhaler) 90 MCG/ACT inhaler  Self No No    Sig: Inhale 1 puff 2 (two) times a day as needed (wheezing) Rinse mouth after use.   cholecalciferol (VITAMIN D3) 1,000 units tablet 5/24/2024 Self Yes Yes   Sig: Take 1,000 Units by mouth daily   levocetirizine (XYZAL) 5 MG tablet 5/24/2024 Self No Yes   Sig: Take 1 tablet (5 mg total) by mouth every evening   omeprazole (PriLOSEC) 40 MG capsule 5/25/2024 Self No Yes   Sig: Take 1 capsule (40 mg total) by mouth daily      Facility-Administered Medications: None     Medication Administration - last 24 hours from 05/24/2024 1342 to 05/25/2024 1342         Date/Time Order Dose Route Action Action by     05/25/2024 1321 EDT docusate sodium (COLACE) capsule 100 mg 100 mg Oral Given Melissa Varghese RN     05/25/2024 1322 EDT betamethasone acetate-betamethasone sodium phosphate (CELESTONE) injection 12 mg 12 mg Intramuscular Given Melissa Varghese RN     05/25/2024 1255 EDT lactated ringers bolus 1,000 mL 1,000 mL Intravenous New Bag Melissa Varghese RN          Current Facility-Administered Medications   Medication Dose Route Frequency    acetaminophen (TYLENOL) tablet 650 mg  650 mg Oral Q4H PRN    betamethasone acetate-betamethasone sodium phosphate (CELESTONE) injection 12 mg  12 mg Intramuscular Q24H    calcium carbonate (TUMS) chewable tablet 1,000 mg  1,000 mg Oral Daily PRN    docusate sodium (COLACE) capsule 100 mg  100 mg Oral BID    lactated ringers bolus 1,000 mL  1,000 mL Intravenous Once    lactated ringers infusion  100 mL/hr Intravenous Continuous    ondansetron (ZOFRAN) injection 4 mg  4 mg Intravenous Q8H PRN    Rho(D) immune globulin (RHOGAM ULTRA-FILTERED PLUS) IM injection 300 mcg  300 mcg Intramuscular Once    simethicone (MYLICON) chewable tablet 80 mg  80 mg Oral 4x Daily PRN     Facility-Administered Medications Ordered in Other Encounters   Medication Dose Route Frequency    [MAR Hold] alteplase (CATHFLO) injection 2 mg  2 mg Intracatheter Q1MIN PRN     Allergies   Allergen Reactions     "Bee Venom Anaphylaxis     breathing  breathing      Cinnamon - Food Allergy Anaphylaxis     Lab test 0.  Per pt she has tolerated many times by accident, although has not had formal food challenge by allergy    Albuterol Tachycardia       Objective    Patient Vitals for the past 24 hrs:   BP Pulse Resp SpO2 Height Weight   05/25/24 1115 133/78 84 18 100 % 5' 6\" (1.676 m) 125 kg (275 lb)     Vitals: Blood pressure 133/78, pulse 84, resp. rate 18, height 5' 6\" (1.676 m), weight 125 kg (275 lb), last menstrual period 10/10/2023, SpO2 100%.Body mass index is 44.39 kg/m².    Physical Exam  Vitals reviewed.   Constitutional:       Appearance: Normal appearance.   Cardiovascular:      Pulses: Normal pulses.   Pulmonary:      Effort: Pulmonary effort is normal. No respiratory distress.   Abdominal:      Palpations: Abdomen is soft.      Tenderness: There is no abdominal tenderness.   Genitourinary:     General: Normal vulva.      Comments: Normal appearing external genitalia, nulliparous cervix is 1cm dilated visually, moderate dark blood in vault  Skin:     General: Skin is warm and dry.   Neurological:      Mental Status: She is alert.   Psychiatric:         Mood and Affect: Mood normal.         Behavior: Behavior normal.       Prenatal Labs: I have personally reviewed pertinent reports.  , Blood Type: No results found for: \"ABO\"  , D (Rh type): No results found for: \"RH\"  , Antibody Screen: No results found for: \"ANTIBODYSCR\" , HCT/HGB:   Lab Results   Component Value Date    HCT 32.5 (L) 05/25/2024    HGB 10.3 (L) 05/25/2024      , Platelets: No results found for: \"PLATELETS\"   , 1 hour Glucola: No results found for: \"GLUCOLA\", 3 hour GTT: No results found for: \"KHAZSPX1UZ\", Rubella: No results found for: \"RUBELLAIGGQT\"     , VDRL/RPR: No results found for: \"RPR\"   , Hep B: No results found for: \"HEPBSAG\"  , HIV: No results found for: \"HIVAGAB\"  , Group B Strep:  No results found for: \"STREPGRPB\"       Results from last " 7 days   Lab Units 05/25/24  1314 05/25/24  0950   WBC Thousand/uL 9.34 10.39*   TOTAL NEUT ABS Thousands/µL  --  7.55   HEMOGLOBIN g/dL 10.3* 10.7*   MCV fL 89 89   PLATELETS Thousands/uL 189 202     Results from last 7 days   Lab Units 05/25/24  0950   SEGS PCT % 73   MONO PCT % 9   EOS PCT % 0     Results from last 7 days   Lab Units 05/25/24  0950   POTASSIUM mmol/L 3.8   CHLORIDE mmol/L 107   CO2 mmol/L 20*   BUN mg/dL 6   CREATININE mg/dL 0.50*   EGFR ml/min/1.73sq m 134     Results from last 7 days   Lab Units 05/25/24  0950   AST U/L 17   ALT U/L 11   ALK PHOS U/L 110*     Results from last 7 days   Lab Units 05/25/24  1314 05/25/24  0950   PLATELETS Thousands/uL 189 202   INR  0.90  --    PROTIME seconds 12.8  --    PTT seconds 24  --                            Fetal data:  Nonstress test: date 05/25/24 continuous  Baseline:  130 bpm  Variability: moderate  Accelerations: present, 15x15  Decelerations: absent  Contractions: absent  Assessment: reactive  Plan:  continuous monitoring    M ultrasound report key findings:     Fetus # 1 of 1  Vertex presentation  Fetal growth appeared normal  Placenta Location = Posterior  Placenta previa     MEASUREMENTS (* Included In Average GA)     AC             27.05 cm        31 weeks 1 day * (67%)  BPD             7.45 cm        29 weeks 6 days* (23%)  HC             28.92 cm        31 weeks 6 days* (54%)  Femur           5.60 cm        29 weeks 4 days* (13%)     HC/AC           1.07 [0.99 - 1.21]                 (44%)  FL/AC             21 [20 - 24]  FL/BPD            75 [71 - 87]  EFW [place] 4   1600 grams - 3 lbs 8 oz                 (44%)    Imaging, EKG, Pathology, and Other Studies: I have personally reviewed pertinent reports.            Pat Abdul MD  5/25/2024  1:42 PM

## 2024-05-25 NOTE — ASSESSMENT & PLAN NOTE
Routine postpartum care  Encourage ambulation  Encourage familial bonding  Lactation support as needed  Pain: Motrin/Tylenol around the clock, oxycodone if needed  Pre-delivery Hgb 11.3 --> 9.8  Voiding spontaneously, s/p arreola  DVT Ppx: ambulation, SCDs, Lovenox 40mg BID

## 2024-05-25 NOTE — ED PROVIDER NOTES
History  Chief Complaint   Patient presents with    Vaginal Bleeding - Pregnant     Pt was at infusion (iron) and had BRB. Pt 32 weeks pregnant       28 year old female G1Po 32 weeks pregnant with PMHx Crohn's disease, Hashimoto's disease, common variable immunodeficiency, migraines, anxiety, and anemia  pesents to the ED for evaluation of vaginal bleeding.  Patient was receiving iron infusion upstairs when she had the urge to urinate, went to the restroom and noted vaginal bleeding and abdominal cramping.  Patient denies passing clots, gush of fluids.  Patient reports good fetal movement.  Patient denies recent falls, shortness of breath, dizziness, chest pain.      History provided by:  Patient      Prior to Admission Medications   Prescriptions Last Dose Informant Patient Reported? Taking?   Adalimumab 40 MG/0.4ML PNKT  Self No No   Sig: Inject 40 mg under the skin every 7 days Maintenance dose.   Immune Globulin, Human, (Hizentra) 10 GM/50ML SOLN  Self No No   Sig: Inject 14 g under the skin once a week   Prenatal-FeFum-FA-DHA w/o A (PRENATAL + DHA PO)   Yes No   Sig: Take by mouth in the morning   Synthroid 100 MCG tablet 5/25/2024 Self No Yes   Sig: Take 1 tablet 5 days a week and 2 tabs 2 days a week   albuterol (PROVENTIL HFA,VENTOLIN HFA) 90 mcg/act inhaler  Self No No   Sig: Inhale 2 puffs every 4 (four) hours as needed for wheezing   aspirin 81 mg chewable tablet   No No   Sig: Chew 2 tablets (162 mg total) daily at bedtime   budesonide (Pulmicort Flexhaler) 90 MCG/ACT inhaler  Self No No   Sig: Inhale 1 puff 2 (two) times a day as needed (wheezing) Rinse mouth after use.   cholecalciferol (VITAMIN D3) 1,000 units tablet  Self Yes No   Sig: Take 1,000 Units by mouth daily   omeprazole (PriLOSEC) 40 MG capsule 5/25/2024 Self No Yes   Sig: Take 1 capsule (40 mg total) by mouth daily      Facility-Administered Medications: None       Past Medical History:   Diagnosis Date    Anemia     Anxiety     Asthma      Colitis, chronic, ulcerative (HCC)     Disease of thyroid gland     Endometriosis     GERD (gastroesophageal reflux disease)     Hashimoto's disease     Heartburn     Hyperthyroidism     Immune deficiency disorder (HCC)     Irregular menses 2022    Migraine     Palpitations 2022    Pneumonia     Rash     Ulcerative colitis (HCC)     crohn's disease, takes Humara weekly    Urinary tract infection     Urticaria     Varicella     disease as child and vaccinated       Past Surgical History:   Procedure Laterality Date    ADENOIDECTOMY      COLONOSCOPY      COLPOSCOPY      EAR SURGERY Bilateral     Tubes    MEDIPORT INSERTION, SINGLE      x2    MOLE REMOVAL      PA  DELIVERY ONLY N/A 2024    Procedure:  SECTION ();  Surgeon: Diana Khalil DO;  Location: [facility] ;  Service: Obstetrics       Family History   Problem Relation Age of Onset    Diabetes Mother     Migraines Mother     DEDE disease Mother     Asthma Mother     Allergies Mother     Diabetes type II Mother     Infertility Mother         And father    Bipolar disorder Father     Anxiety disorder Father     Hypertension Father     Allergies Father     Hypertension Maternal Grandmother      I have reviewed and agree with the history as documented.    E-Cigarette/Vaping    E-Cigarette Use Never User      E-Cigarette/Vaping Substances    Nicotine No     THC No     CBD No     Flavoring No     Other No     Unknown No      Social History     Tobacco Use    Smoking status: Never    Smokeless tobacco: Never   Vaping Use    Vaping status: Never Used   Substance Use Topics    Alcohol use: Yes     Comment: Occasional    Drug use: Never        Review of Systems   Constitutional:  Negative for chills and fever.   HENT:  Negative for ear pain and sore throat.    Eyes:  Negative for pain and visual disturbance.   Respiratory:  Negative for cough and shortness of breath.    Cardiovascular:  Negative for chest pain and palpitations.    Gastrointestinal:  Positive for abdominal pain. Negative for vomiting.   Genitourinary:  Positive for vaginal bleeding. Negative for dysuria and hematuria.   Musculoskeletal:  Negative for arthralgias and back pain.   Skin:  Negative for color change and rash.   Neurological:  Negative for seizures and syncope.   All other systems reviewed and are negative.      Physical Exam  ED Triage Vitals [05/25/24 0919]   Temperature Pulse Respirations Blood Pressure SpO2   (!) 97.2 °F (36.2 °C) 99 17 146/64 98 %      Temp Source Heart Rate Source Patient Position - Orthostatic VS BP Location FiO2 (%)   Oral Monitor -- Right arm --      Pain Score       No Pain             Orthostatic Vital Signs  Vitals:    05/25/24 0919   BP: 146/64   Pulse: 99       Physical Exam  Vitals and nursing note reviewed.   Constitutional:       General: She is not in acute distress.     Appearance: Normal appearance. She is not ill-appearing, toxic-appearing or diaphoretic.      Comments: Laying on stretcher comfortably not in acute distress   HENT:      Head: Normocephalic and atraumatic.      Right Ear: External ear normal.      Left Ear: External ear normal.      Nose: Nose normal.      Mouth/Throat:      Mouth: Mucous membranes are moist.      Pharynx: No oropharyngeal exudate or posterior oropharyngeal erythema.   Eyes:      General: No scleral icterus.        Right eye: No discharge.         Left eye: No discharge.      Extraocular Movements: Extraocular movements intact.      Conjunctiva/sclera: Conjunctivae normal.   Cardiovascular:      Rate and Rhythm: Normal rate and regular rhythm.      Pulses: Normal pulses.      Heart sounds: No murmur heard.  Pulmonary:      Effort: Pulmonary effort is normal.      Breath sounds: Normal breath sounds. No wheezing.   Chest:      Chest wall: No tenderness.   Abdominal:      General: Abdomen is flat. There is no distension.      Palpations: Abdomen is soft.      Tenderness: There is no abdominal  tenderness. There is no guarding or rebound.      Comments: Gravid abdomen   Genitourinary:     Comments: Bloodstained noted on underwear  Musculoskeletal:         General: No deformity or signs of injury. Normal range of motion.      Cervical back: Normal range of motion and neck supple. No rigidity or tenderness.      Right lower leg: No edema.      Left lower leg: No edema.   Skin:     General: Skin is warm and dry.   Neurological:      General: No focal deficit present.      Mental Status: She is alert and oriented to person, place, and time.      Motor: No weakness.         ED Medications  Medications - No data to display    Diagnostic Studies  Results Reviewed       Procedure Component Value Units Date/Time    Comprehensive metabolic panel [[id number]]  (Abnormal) Collected: 05/25/24 0950    Lab Status: Final result Specimen: Blood from Arm, Left Updated: 05/25/24 1020     Sodium 135 mmol/L      Potassium 3.8 mmol/L      Chloride 107 mmol/L      CO2 20 mmol/L      ANION GAP 8 mmol/L      BUN 6 mg/dL      Creatinine 0.50 mg/dL      Glucose 82 mg/dL      Calcium 8.7 mg/dL      Corrected Calcium 9.3 mg/dL      AST 17 U/L      ALT 11 U/L      Alkaline Phosphatase 110 U/L      Total Protein 6.7 g/dL      Albumin 3.2 g/dL      Total Bilirubin 0.30 mg/dL      eGFR 134 ml/min/1.73sq m     Narrative:      National Kidney Disease Foundation guidelines for Chronic Kidney Disease (CKD):     Stage 1 with normal or high GFR (GFR > 90 mL/min/1.73 square meters)    Stage 2 Mild CKD (GFR = 60-89 mL/min/1.73 square meters)    Stage 3A Moderate CKD (GFR = 45-59 mL/min/1.73 square meters)    Stage 3B Moderate CKD (GFR = 30-44 mL/min/1.73 square meters)    Stage 4 Severe CKD (GFR = 15-29 mL/min/1.73 square meters)    Stage 5 End Stage CKD (GFR <15 mL/min/1.73 square meters)  Note: GFR calculation is accurate only with a steady state creatinine    CBC and differential [[id number]]  (Abnormal) Collected: 05/25/24 0950    Lab Status:  Final result Specimen: Blood from Arm, Left Updated: 05/25/24 1000     WBC 10.39 Thousand/uL      RBC 3.73 Million/uL      Hemoglobin 10.7 g/dL      Hematocrit 33.1 %      MCV 89 fL      MCH 28.7 pg      MCHC 32.3 g/dL      RDW 21.2 %      MPV 11.1 fL      Platelets 202 Thousands/uL      nRBC 0 /100 WBCs      Segmented % 73 %      Immature Grans % 2 %      Lymphocytes % 16 %      Monocytes % 9 %      Eosinophils Relative 0 %      Basophils Relative 0 %      Absolute Neutrophils 7.55 Thousands/µL      Absolute Immature Grans 0.20 Thousand/uL      Absolute Lymphocytes 1.66 Thousands/µL      Absolute Monocytes 0.93 Thousand/µL      Eosinophils Absolute 0.02 Thousand/µL      Basophils Absolute 0.03 Thousands/µL                    US bedside procedure   Final Result by Interface, Externalimages (05/25 0929)            Procedures  POC Pelvic US    Date/Time: 5/25/2024 9:31 AM    Performed by: Joselo Samuel DO  Authorized by: Joselo Samuel DO    Patient location:  Bedside  Other Assisting Provider: Yes (comment)    Procedure details:     Exam Type:  Diagnostic    Indications: evaluate for IUP and pregnant with vaginal bleeding      Assessment for: confirm intrauterine pregnancy and evaluate fetal viability      Technique:  Transabdominal obstetric (HCG+) exam    Views obtained: uterus (transverse and sagittal)      Image availability:  Images available in PACS, still images obtained and video obtained  Uterine findings:     Intrauterine pregnancy: identified      Single gestation: identified      Fetal heart rate: identified      Fetal heart rate (bpm):  140  Interpretation:     Ultrasound impressions: normal      Pregnancy findings: intrauterine pregnancy (IUP)      Estimated gestational age (weeks):  32        ED Course  ED Course as of 05/27/24 2108   Sat May 25, 2024   0934 Order for PACS placed   0945 PACS called and spoke with [facility] and Dr. Mccauley who accepted pt   0950 Called PACS again. Waiting for PACS to give ETA   1010  Messaged Dr. Olvera, GYN, to update. Still no ETA from PACS. Called PACS again - still no ETA.   1020 Called PACS again. ETA 10:30  - Pt stable VS   1030 PACS arrived, pt stable for transport.                              SBIRT 22yo+      Flowsheet Row Most Recent Value   Initial Alcohol Screen: US AUDIT-C     1. How often do you have a drink containing alcohol? 0 Filed at: 05/25/2024 0919   2. How many drinks containing alcohol do you have on a typical day you are drinking?  0 Filed at: 05/25/2024 0919   3a. Male UNDER 65: How often do you have five or more drinks on one occasion? 0 Filed at: 05/25/2024 0919   3b. FEMALE Any Age, or MALE 65+: How often do you have 4 or more drinks on one occassion? 0 Filed at: 05/25/2024 0919   Audit-C Score 0 Filed at: 05/25/2024 0919   DEE DEE: How many times in the past year have you...    Used an illegal drug or used a prescription medication for non-medical reasons? Never Filed at: 05/25/2024 0919                  Medical Decision Making  28 year old female G1Po 32 weeks pregnant with PMHx Crohn's disease, Hashimoto's disease, common variable immunodeficiency, migraines, anxiety, and anemia  pesents to the ED for evaluation of vaginal bleeding.    DDX: placental abruption, placenta previa, fetal distress  Will evaluate with CBC, CMP, OB ultrasound  Labs unremarkable  OB ultrasound confirmed intrauterine pregnancy, fetal heart rate between 140s and 170s  PACs order placed spoke with Dr. Mccauley at [facility] who accepts patient  Several calls were made to PACS as no ETA was given until 45 minutes later  Patient was frequently reevaluated, vaginal bleeding controlled at this time, patient still reports mild abdominal cramping this however patient stable for transfer.      Amount and/or Complexity of Data Reviewed  External Data Reviewed: labs.  Labs: ordered.          Disposition  Final diagnoses:   Vaginal bleeding during pregnancy     Time reflects when diagnosis was  documented in both MDM as applicable and the Disposition within this note       Time User Action Codes Description Comment    5/25/2024  9:45 AM Joselo Samuel Add [O46.90] Vaginal bleeding during pregnancy           ED Disposition       ED Disposition   Transfer to Another Facility-In Network    Condition   --    Date/Time   Sat May 25, 2024 6385    Comment   Antony Rodriguez should be transferred out to [facility].               MD Documentation      Flowsheet Row Most Recent Value   Patient Condition The patient has been stabilized such that within reasonable medical probability, no material deterioration of the patient condition or the condition of the unborn child(marialuisa) is likely to result from the transfer   Reason for Transfer Level of Care needed not available at this facility   Benefits of Transfer Specialized equipment and/or services available at the receiving facility (Include comment)________________________  [OB specialty, labor and delivery unit]   Risks of Transfer Loss of IV, Potential deterioration of medical condition, Increased discomfort during transfer, Possible worsening of condition or death during transfer   Accepting Physician Dr. Manda Mccauley   Accepting Facility Name, [facility]    (Name & Tel number) El[facility] [phone]   Transported by (Company and Unit #) PACS   Sending MD Joselo Samuel DO   Provider Certification General risk, such as traffic hazards, adverse weather conditions, rough terrain or turbulence, possible failure of equipment (including vehicle or aircraft), or consequences of actions of persons outside the control of the transport personnel, Unanticipated needs of medical equipment and personnel during transport, Risk of worsening condition, The possibility of a transport vehicle being unavailable          RN Documentation      Flowsheet Row Most Recent Value   Accepting Facility Name, [facility]     (Name & Tel number) [facility] [phone]   Report Given to Dr. Manda Mccauley   Transport Mode Ambulance   Transported by (Company and Unit #) PACS   Level of Care Advanced life support   Copies of Medical Records Sent Progress note, Nursing note, History and Physical   Patient Belongings Disposition Sent with patient   Transfer Date 05/25/24   Transfer Time 0952          Follow-up Information    None         Discharge Medication List as of 5/25/2024 10:54 AM        CONTINUE these medications which have NOT CHANGED    Details   omeprazole (PriLOSEC) 40 MG capsule Take 1 capsule (40 mg total) by mouth daily, Starting Thu 4/4/2024, Normal      Synthroid 100 MCG tablet Take 1 tablet 5 days a week and 2 tabs 2 days a week, Normal      Adalimumab 40 MG/0.4ML PNKT Inject 40 mg under the skin every 7 days Maintenance dose., Starting Thu 4/11/2024, Normal      albuterol (PROVENTIL HFA,VENTOLIN HFA) 90 mcg/act inhaler Inhale 2 puffs every 4 (four) hours as needed for wheezing, Starting Wed 9/27/2023, Normal      aspirin 81 mg chewable tablet Chew 2 tablets (162 mg total) daily at bedtime, Starting Fri 4/26/2024, Until Thu 7/25/2024, Normal      budesonide (Pulmicort Flexhaler) 90 MCG/ACT inhaler Inhale 1 puff 2 (two) times a day as needed (wheezing) Rinse mouth after use., Starting Wed 3/6/2024, Normal      cholecalciferol (VITAMIN D3) 1,000 units tablet Take 1,000 Units by mouth daily, Historical Med      Immune Globulin, Human, (Hizentra) 10 GM/50ML SOLN Inject 14 g under the skin once a week, Starting Thu 3/7/2024, Normal      Prenatal-FeFum-FA-DHA w/o A (PRENATAL + DHA PO) Take by mouth in the morning, Historical Med      Immune Globulin, Human, (HIZENTRA SC) Inject 1 Dose under the skin once a week, Historical Med      levocetirizine (XYZAL) 5 MG tablet Take 1 tablet (5 mg total) by mouth every evening, Starting Thu 4/4/2024, Normal      Multiple Vitamin (MULTIVITAMIN) tablet Take 1 tablet by mouth daily, Historical Med            No discharge procedures on file.    PDMP Review         Value Time User    PDMP Reviewed  Yes 9/19/2023  6:11 PM Ivette Hendrix DO             ED Provider  Attending physically available and evaluated Antony Rodriguez. I managed the patient along with the ED Attending.    Electronically Signed by           Joselo Samuel DO  05/27/24 8480

## 2024-05-25 NOTE — CONSULTS
NICU Prenatal Consult   Antony Rodriguez 28 y.o. female MRN: [id number]  Unit/Bed#: -01 Encounter: [id number]    Consulting Physician: Diana Khalil,       A NICU Prenatal Consult has been requested by Dr Khalil due to placenta previa .     Antony Rodriguez is a 28 y.o. , with an RAJIV of 2024  at 32w4d GA   Antony is being admitted for vaginal bleeding in the setting of placenta previa. This is her second episode of vaginal bleeding     Antony is expecting a son , to be named Markel. Estimated fetal weight is 1600  (@30 3/7 weeks gestation). She intends to breastfeed, agrees to [facility] as a bridge    Along with Rupesh Hardy , her  and maternal grandmother   was present for the consultation.     Prenatal Care:Yes, description good , up to date with prenatal care  with Dr Khalil  O neg, Received Rhogam at 28w   GBS unk  Abnormal 1hr GTT with Nml 3hr GTT   BTM 2024 @  Prenatal Labs:  Lab Results   Component Value Date/Time    ABO Grouping O 2024 01:14 PM    Rh Factor Negative 2024 01:14 PM    Hepatitis B Surface Ag Non-reactive 2024 04:11 PM    Hepatitis C Ab Non-reactive 2024 04:11 PM    Rubella IgG Quant 35.6 2024 04:11 PM       Unknown labs at this time: GBS unknown    Pregnancy complications:   Patient Active Problem List   Diagnosis    Chronic migraine without aura without status migrainosus, not intractable    Asthma, moderate persistent    Chronic rhinitis    Hypothyroidism due to Hashimoto's thyroiditis    IBS (irritable bowel syndrome)    Iron deficiency anemia    Unequal leg length    Vitamin D deficiency    GERD (gastroesophageal reflux disease)    ADHD (attention deficit hyperactivity disorder), combined type    CVID (common variable immunodeficiency) (HCC)    Endometriosis    Crohn's disease of colon without complication (HCC)    Placenta previa in third trimester    Iron deficiency anemia during pregnancy    32 weeks gestation of pregnancy     Rh negative state in antepartum period, third trimester     Maternal medical history:   Past Medical History:   Diagnosis Date    Anemia     Anxiety     Asthma     Colitis, chronic, ulcerative (HCC)     Disease of thyroid gland     Endometriosis     GERD (gastroesophageal reflux disease)     Hashimoto's disease     Heartburn     Hyperthyroidism     Immune deficiency disorder (HCC)     Irregular menses 2022    Migraine     Palpitations 2022    Pneumonia     Rash     Ulcerative colitis (HCC)     crohn's disease, takes Humara weekly    Urinary tract infection     Urticaria     Varicella     disease as child and vaccinated     Medications at home:   Medications Prior to Admission:     Adalimumab 40 MG/0.4ML PNKT    aspirin 81 mg chewable tablet    cholecalciferol (VITAMIN D3) 1,000 units tablet    Immune Globulin, Human, (Hizentra) 10 GM/50ML SOLN    levocetirizine (XYZAL) 5 MG tablet    omeprazole (PriLOSEC) 40 MG capsule    Prenatal-FeFum-FA-DHA w/o A (PRENATAL + DHA PO)    Synthroid 100 MCG tablet    albuterol (PROVENTIL HFA,VENTOLIN HFA) 90 mcg/act inhaler    budesonide (Pulmicort Flexhaler) 90 MCG/ACT inhaler    Immune Globulin, Human, (HIZENTRA SC)    Multiple Vitamin (MULTIVITAMIN) tablet  Maternal social history:  Social History     Tobacco Use    Smoking status: Never    Smokeless tobacco: Never   Substance Use Topics    Alcohol use: Yes     Comment: Occasional     Maternal  medications:  steroids: BTM      I spoke with Antony Rodriguez today regarding the upcoming birth of her son.  We discussed the baby's possible medical problems and the specialized care needed to address these problems.  This discussion included, but was not limited to:       Attendance of physician/LISA, nursing, and/or respiratory therapist in the delivery and delivery room management , Risk of feedings problems and potential need for IV fluids or gavage tube feeds, Risk of hypoglycemia requiring formula feeding  if breastmilk is unavailable or IV dextrose infusion, Risk of hypothermia and need for radiant warmer and/or isolette, Risk of immature cardiorespiratory control and need for monitoring and/or caffeine , Risk of jaundice requiring phototherapy, Risk of NEC and advantages of breast milk , Risk of respiratory distress and possible need for support (oxygen, CPAP, intubation, and/or surfactant), and Risk of sepsis and possible need for lab tests and IV antibiotics      All of Antony's questions were answered to the best of my ability, and she was encouraged to contact us with any further questions.      Thank you for including us in the care of Antony Rodriguez. Please reach out to the on-call Neonatologist via Constant Contact for any further questions that may arise.    I spent 20 minutes in consultation with Antony Rodriguez, of which 100 % was in direct communication.    AB Stringer  - Medicine

## 2024-05-26 ENCOUNTER — ANESTHESIA (OUTPATIENT)
Dept: LABOR AND DELIVERY | Facility: HOSPITAL | Age: 29
End: 2024-05-26
Payer: COMMERCIAL

## 2024-05-26 ENCOUNTER — ANESTHESIA EVENT (OUTPATIENT)
Dept: LABOR AND DELIVERY | Facility: HOSPITAL | Age: 29
End: 2024-05-26
Payer: COMMERCIAL

## 2024-05-26 PROBLEM — Z98.891 STATUS POST PRIMARY LOW TRANSVERSE CESAREAN SECTION: Status: ACTIVE | Noted: 2024-03-08

## 2024-05-26 PROBLEM — O44.00: Status: ACTIVE | Noted: 2024-05-26

## 2024-05-26 PROBLEM — R03.0 ELEVATED BP WITHOUT DIAGNOSIS OF HYPERTENSION: Status: ACTIVE | Noted: 2024-05-26

## 2024-05-26 LAB
APTT PPP: 23 SECONDS (ref 23–37)
BASE EXCESS BLDCOA CALC-SCNC: -8.1 MMOL/L (ref 3–11)
BASE EXCESS BLDCOV CALC-SCNC: -7.6 MMOL/L (ref 1–9)
BASOPHILS # BLD AUTO: 0.02 THOUSANDS/ÂΜL (ref 0–0.1)
BASOPHILS NFR BLD AUTO: 0 % (ref 0–1)
EOSINOPHIL # BLD AUTO: 0 THOUSAND/ÂΜL (ref 0–0.61)
EOSINOPHIL NFR BLD AUTO: 0 % (ref 0–6)
ERYTHROCYTE [DISTWIDTH] IN BLOOD BY AUTOMATED COUNT: 21.2 % (ref 11.6–15.1)
ERYTHROCYTE [DISTWIDTH] IN BLOOD BY AUTOMATED COUNT: 21.6 % (ref 11.6–15.1)
FIBRINOGEN PPP-MCNC: 574 MG/DL (ref 207–520)
HCO3 BLDCOA-SCNC: 18.6 MMOL/L (ref 17.3–27.3)
HCO3 BLDCOV-SCNC: 20.1 MMOL/L (ref 12.2–28.6)
HCT VFR BLD AUTO: 30.7 % (ref 34.8–46.1)
HCT VFR BLD AUTO: 34.8 % (ref 34.8–46.1)
HGB BLD-MCNC: 11.3 G/DL (ref 11.5–15.4)
HGB BLD-MCNC: 9.8 G/DL (ref 11.5–15.4)
IMM GRANULOCYTES # BLD AUTO: 0.16 THOUSAND/UL (ref 0–0.2)
IMM GRANULOCYTES NFR BLD AUTO: 1 % (ref 0–2)
INR PPP: 0.88 (ref 0.84–1.19)
LYMPHOCYTES # BLD AUTO: 1.37 THOUSANDS/ÂΜL (ref 0.6–4.47)
LYMPHOCYTES NFR BLD AUTO: 9 % (ref 14–44)
MCH RBC QN AUTO: 28.7 PG (ref 26.8–34.3)
MCH RBC QN AUTO: 28.9 PG (ref 26.8–34.3)
MCHC RBC AUTO-ENTMCNC: 31.9 G/DL (ref 31.4–37.4)
MCHC RBC AUTO-ENTMCNC: 32.5 G/DL (ref 31.4–37.4)
MCV RBC AUTO: 89 FL (ref 82–98)
MCV RBC AUTO: 90 FL (ref 82–98)
MONOCYTES # BLD AUTO: 0.53 THOUSAND/ÂΜL (ref 0.17–1.22)
MONOCYTES NFR BLD AUTO: 4 % (ref 4–12)
NEUTROPHILS # BLD AUTO: 13.17 THOUSANDS/ÂΜL (ref 1.85–7.62)
NEUTS SEG NFR BLD AUTO: 86 % (ref 43–75)
NRBC BLD AUTO-RTO: 0 /100 WBCS
O2 CT VFR BLDCOA CALC: 6.6 ML/DL
OXYHGB MFR BLDCOA: 31.2 %
OXYHGB MFR BLDCOV: 26.1 %
PCO2 BLDCOA: 42.7 MM[HG] (ref 30–60)
PCO2 BLDCOV: 49.3 MM HG (ref 27–43)
PH BLDCOA: 7.26 [PH] (ref 7.23–7.43)
PH BLDCOV: 7.23 [PH] (ref 7.19–7.49)
PLATELET # BLD AUTO: 200 THOUSANDS/UL (ref 149–390)
PLATELET # BLD AUTO: 219 THOUSANDS/UL (ref 149–390)
PMV BLD AUTO: 11.4 FL (ref 8.9–12.7)
PMV BLD AUTO: 11.5 FL (ref 8.9–12.7)
PO2 BLDCOA: 15.7 MM HG (ref 5–25)
PO2 BLDCOV: 14.5 MM HG (ref 15–45)
PROTHROMBIN TIME: 12.5 SECONDS (ref 11.6–14.5)
RBC # BLD AUTO: 3.41 MILLION/UL (ref 3.81–5.12)
RBC # BLD AUTO: 3.91 MILLION/UL (ref 3.81–5.12)
SAO2 % BLDCOV: 5.5 ML/DL
WBC # BLD AUTO: 15.25 THOUSAND/UL (ref 4.31–10.16)
WBC # BLD AUTO: 17.09 THOUSAND/UL (ref 4.31–10.16)

## 2024-05-26 PROCEDURE — 85610 PROTHROMBIN TIME: CPT

## 2024-05-26 PROCEDURE — 59510 CESAREAN DELIVERY: CPT | Performed by: OBSTETRICS & GYNECOLOGY

## 2024-05-26 PROCEDURE — NC001 PR NO CHARGE: Performed by: OBSTETRICS & GYNECOLOGY

## 2024-05-26 PROCEDURE — 85027 COMPLETE CBC AUTOMATED: CPT

## 2024-05-26 PROCEDURE — 85025 COMPLETE CBC W/AUTO DIFF WBC: CPT

## 2024-05-26 PROCEDURE — 85384 FIBRINOGEN ACTIVITY: CPT

## 2024-05-26 PROCEDURE — 85730 THROMBOPLASTIN TIME PARTIAL: CPT

## 2024-05-26 PROCEDURE — 82805 BLOOD GASES W/O2 SATURATION: CPT | Performed by: OBSTETRICS & GYNECOLOGY

## 2024-05-26 RX ORDER — OXYTOCIN/RINGER'S LACTATE 30/500 ML
62.5 PLASTIC BAG, INJECTION (ML) INTRAVENOUS ONCE
Status: COMPLETED | OUTPATIENT
Start: 2024-05-26 | End: 2024-05-26

## 2024-05-26 RX ORDER — NALOXONE HYDROCHLORIDE 0.4 MG/ML
0.1 INJECTION, SOLUTION INTRAMUSCULAR; INTRAVENOUS; SUBCUTANEOUS
Status: DISCONTINUED | OUTPATIENT
Start: 2024-05-26 | End: 2024-05-27

## 2024-05-26 RX ORDER — LEVOTHYROXINE SODIUM 0.1 MG/1
200 TABLET ORAL
Status: DISCONTINUED | OUTPATIENT
Start: 2024-05-26 | End: 2024-05-29 | Stop reason: HOSPADM

## 2024-05-26 RX ORDER — METOCLOPRAMIDE HYDROCHLORIDE 5 MG/ML
5 INJECTION INTRAMUSCULAR; INTRAVENOUS EVERY 6 HOURS PRN
Status: DISCONTINUED | OUTPATIENT
Start: 2024-05-26 | End: 2024-05-27

## 2024-05-26 RX ORDER — IBUPROFEN 600 MG/1
600 TABLET ORAL EVERY 6 HOURS
Status: DISCONTINUED | OUTPATIENT
Start: 2024-05-28 | End: 2024-05-29 | Stop reason: HOSPADM

## 2024-05-26 RX ORDER — KETOROLAC TROMETHAMINE 30 MG/ML
INJECTION, SOLUTION INTRAMUSCULAR; INTRAVENOUS AS NEEDED
Status: DISCONTINUED | OUTPATIENT
Start: 2024-05-26 | End: 2024-05-26

## 2024-05-26 RX ORDER — FENTANYL CITRATE/PF 50 MCG/ML
25 SYRINGE (ML) INJECTION
Status: DISCONTINUED | OUTPATIENT
Start: 2024-05-26 | End: 2024-05-27

## 2024-05-26 RX ORDER — METOCLOPRAMIDE HYDROCHLORIDE 5 MG/ML
10 INJECTION INTRAMUSCULAR; INTRAVENOUS ONCE AS NEEDED
Status: DISCONTINUED | OUTPATIENT
Start: 2024-05-26 | End: 2024-05-26

## 2024-05-26 RX ORDER — ONDANSETRON 2 MG/ML
4 INJECTION INTRAMUSCULAR; INTRAVENOUS EVERY 8 HOURS PRN
Status: DISCONTINUED | OUTPATIENT
Start: 2024-05-27 | End: 2024-05-26

## 2024-05-26 RX ORDER — ONDANSETRON 2 MG/ML
4 INJECTION INTRAMUSCULAR; INTRAVENOUS EVERY 8 HOURS PRN
Status: DISCONTINUED | OUTPATIENT
Start: 2024-05-27 | End: 2024-05-29 | Stop reason: HOSPADM

## 2024-05-26 RX ORDER — OXYTOCIN/RINGER'S LACTATE 30/500 ML
PLASTIC BAG, INJECTION (ML) INTRAVENOUS CONTINUOUS PRN
Status: DISCONTINUED | OUTPATIENT
Start: 2024-05-26 | End: 2024-05-26

## 2024-05-26 RX ORDER — LEVOTHYROXINE SODIUM 0.1 MG/1
100 TABLET ORAL
Status: DISCONTINUED | OUTPATIENT
Start: 2024-05-27 | End: 2024-05-29 | Stop reason: HOSPADM

## 2024-05-26 RX ORDER — DIPHENHYDRAMINE HCL 25 MG
25 TABLET ORAL EVERY 6 HOURS PRN
Status: DISCONTINUED | OUTPATIENT
Start: 2024-05-26 | End: 2024-05-29 | Stop reason: HOSPADM

## 2024-05-26 RX ORDER — ONDANSETRON 2 MG/ML
4 INJECTION INTRAMUSCULAR; INTRAVENOUS ONCE AS NEEDED
Status: DISCONTINUED | OUTPATIENT
Start: 2024-05-26 | End: 2024-05-26

## 2024-05-26 RX ORDER — NALBUPHINE HYDROCHLORIDE 10 MG/ML
5 INJECTION, SOLUTION INTRAMUSCULAR; INTRAVENOUS; SUBCUTANEOUS
Status: DISCONTINUED | OUTPATIENT
Start: 2024-05-26 | End: 2024-05-27

## 2024-05-26 RX ORDER — MORPHINE SULFATE 0.5 MG/ML
INJECTION, SOLUTION EPIDURAL; INTRATHECAL; INTRAVENOUS AS NEEDED
Status: DISCONTINUED | OUTPATIENT
Start: 2024-05-26 | End: 2024-05-26

## 2024-05-26 RX ORDER — SIMETHICONE 80 MG
80 TABLET,CHEWABLE ORAL 4 TIMES DAILY PRN
Status: DISCONTINUED | OUTPATIENT
Start: 2024-05-26 | End: 2024-05-29 | Stop reason: HOSPADM

## 2024-05-26 RX ORDER — BUPIVACAINE HYDROCHLORIDE 7.5 MG/ML
INJECTION, SOLUTION INTRASPINAL AS NEEDED
Status: DISCONTINUED | OUTPATIENT
Start: 2024-05-26 | End: 2024-05-26

## 2024-05-26 RX ORDER — ONDANSETRON 2 MG/ML
4 INJECTION INTRAMUSCULAR; INTRAVENOUS EVERY 6 HOURS PRN
Status: DISCONTINUED | OUTPATIENT
Start: 2024-05-26 | End: 2024-05-27

## 2024-05-26 RX ORDER — KETOROLAC TROMETHAMINE 30 MG/ML
30 INJECTION, SOLUTION INTRAMUSCULAR; INTRAVENOUS EVERY 6 HOURS SCHEDULED
Status: COMPLETED | OUTPATIENT
Start: 2024-05-26 | End: 2024-05-27

## 2024-05-26 RX ORDER — OXYCODONE HYDROCHLORIDE 5 MG/1
5 TABLET ORAL EVERY 4 HOURS PRN
Status: DISCONTINUED | OUTPATIENT
Start: 2024-05-27 | End: 2024-05-29 | Stop reason: HOSPADM

## 2024-05-26 RX ORDER — SODIUM CHLORIDE, SODIUM LACTATE, POTASSIUM CHLORIDE, CALCIUM CHLORIDE 600; 310; 30; 20 MG/100ML; MG/100ML; MG/100ML; MG/100ML
125 INJECTION, SOLUTION INTRAVENOUS CONTINUOUS
Status: DISCONTINUED | OUTPATIENT
Start: 2024-05-26 | End: 2024-05-29 | Stop reason: HOSPADM

## 2024-05-26 RX ORDER — ENOXAPARIN SODIUM 100 MG/ML
40 INJECTION SUBCUTANEOUS EVERY 12 HOURS
Status: DISCONTINUED | OUTPATIENT
Start: 2024-05-27 | End: 2024-05-26

## 2024-05-26 RX ORDER — OXYCODONE HYDROCHLORIDE 10 MG/1
10 TABLET ORAL EVERY 4 HOURS PRN
Status: DISCONTINUED | OUTPATIENT
Start: 2024-05-27 | End: 2024-05-29 | Stop reason: HOSPADM

## 2024-05-26 RX ORDER — HUMAN IMMUNOGLOBULIN G 0.2 G/ML
11 LIQUID SUBCUTANEOUS WEEKLY
Status: DISCONTINUED | OUTPATIENT
Start: 2024-05-28 | End: 2024-05-28 | Stop reason: RX

## 2024-05-26 RX ORDER — CALCIUM CARBONATE 500 MG/1
1000 TABLET, CHEWABLE ORAL DAILY PRN
Status: DISCONTINUED | OUTPATIENT
Start: 2024-05-26 | End: 2024-05-29 | Stop reason: HOSPADM

## 2024-05-26 RX ORDER — SODIUM CHLORIDE, SODIUM LACTATE, POTASSIUM CHLORIDE, CALCIUM CHLORIDE 600; 310; 30; 20 MG/100ML; MG/100ML; MG/100ML; MG/100ML
INJECTION, SOLUTION INTRAVENOUS CONTINUOUS PRN
Status: DISCONTINUED | OUTPATIENT
Start: 2024-05-26 | End: 2024-05-26

## 2024-05-26 RX ORDER — ACETAMINOPHEN 325 MG/1
650 TABLET ORAL EVERY 6 HOURS SCHEDULED
Status: COMPLETED | OUTPATIENT
Start: 2024-05-26 | End: 2024-05-28

## 2024-05-26 RX ORDER — CEFAZOLIN SODIUM 1 G/50ML
1000 SOLUTION INTRAVENOUS EVERY 8 HOURS
Status: DISCONTINUED | OUTPATIENT
Start: 2024-05-26 | End: 2024-05-26

## 2024-05-26 RX ORDER — ONDANSETRON 2 MG/ML
INJECTION INTRAMUSCULAR; INTRAVENOUS AS NEEDED
Status: DISCONTINUED | OUTPATIENT
Start: 2024-05-26 | End: 2024-05-26

## 2024-05-26 RX ORDER — ONDANSETRON 2 MG/ML
4 INJECTION INTRAMUSCULAR; INTRAVENOUS EVERY 8 HOURS PRN
Status: DISCONTINUED | OUTPATIENT
Start: 2024-05-26 | End: 2024-05-26

## 2024-05-26 RX ORDER — PROMETHAZINE HYDROCHLORIDE 25 MG/ML
12.5 INJECTION, SOLUTION INTRAMUSCULAR; INTRAVENOUS ONCE
Status: COMPLETED | OUTPATIENT
Start: 2024-05-26 | End: 2024-05-26

## 2024-05-26 RX ORDER — CEFAZOLIN SODIUM 2 G/50ML
2000 SOLUTION INTRAVENOUS EVERY 8 HOURS
Status: DISCONTINUED | OUTPATIENT
Start: 2024-05-26 | End: 2024-05-26

## 2024-05-26 RX ORDER — HYDROMORPHONE HCL/PF 1 MG/ML
0.5 SYRINGE (ML) INJECTION EVERY 2 HOUR PRN
Status: DISCONTINUED | OUTPATIENT
Start: 2024-05-26 | End: 2024-05-29 | Stop reason: HOSPADM

## 2024-05-26 RX ORDER — DOCUSATE SODIUM 100 MG/1
100 CAPSULE, LIQUID FILLED ORAL 2 TIMES DAILY
Status: DISCONTINUED | OUTPATIENT
Start: 2024-05-26 | End: 2024-05-29 | Stop reason: HOSPADM

## 2024-05-26 RX ORDER — FENTANYL CITRATE 50 UG/ML
INJECTION, SOLUTION INTRAMUSCULAR; INTRAVENOUS AS NEEDED
Status: DISCONTINUED | OUTPATIENT
Start: 2024-05-26 | End: 2024-05-26

## 2024-05-26 RX ORDER — OXYCODONE HYDROCHLORIDE 5 MG/1
5 TABLET ORAL EVERY 4 HOURS PRN
Status: DISCONTINUED | OUTPATIENT
Start: 2024-05-26 | End: 2024-05-26 | Stop reason: SDUPTHER

## 2024-05-26 RX ORDER — HYDROMORPHONE HCL/PF 1 MG/ML
0.5 SYRINGE (ML) INJECTION
Status: DISCONTINUED | OUTPATIENT
Start: 2024-05-26 | End: 2024-05-26

## 2024-05-26 RX ORDER — ENOXAPARIN SODIUM 100 MG/ML
40 INJECTION SUBCUTANEOUS EVERY 12 HOURS
Status: DISCONTINUED | OUTPATIENT
Start: 2024-05-26 | End: 2024-05-29 | Stop reason: HOSPADM

## 2024-05-26 RX ADMIN — ENOXAPARIN SODIUM 40 MG: 40 INJECTION SUBCUTANEOUS at 20:28

## 2024-05-26 RX ADMIN — SODIUM CHLORIDE, SODIUM LACTATE, POTASSIUM CHLORIDE, AND CALCIUM CHLORIDE: .6; .31; .03; .02 INJECTION, SOLUTION INTRAVENOUS at 01:53

## 2024-05-26 RX ADMIN — AZITHROMYCIN MONOHYDRATE 500 MG: 500 INJECTION, POWDER, LYOPHILIZED, FOR SOLUTION INTRAVENOUS at 01:59

## 2024-05-26 RX ADMIN — KETOROLAC TROMETHAMINE 30 MG: 30 INJECTION, SOLUTION INTRAMUSCULAR; INTRAVENOUS at 10:03

## 2024-05-26 RX ADMIN — ACETAMINOPHEN 650 MG: 325 TABLET, FILM COATED ORAL at 12:01

## 2024-05-26 RX ADMIN — ONDANSETRON 4 MG: 2 INJECTION INTRAMUSCULAR; INTRAVENOUS at 02:26

## 2024-05-26 RX ADMIN — KETOROLAC TROMETHAMINE 30 MG: 30 INJECTION, SOLUTION INTRAMUSCULAR; INTRAVENOUS at 02:51

## 2024-05-26 RX ADMIN — CEFAZOLIN SODIUM 1000 MG: 1 SOLUTION INTRAVENOUS at 01:58

## 2024-05-26 RX ADMIN — IRON SUCROSE 200 MG: 20 INJECTION, SOLUTION INTRAVENOUS at 16:05

## 2024-05-26 RX ADMIN — SODIUM CHLORIDE, SODIUM LACTATE, POTASSIUM CHLORIDE, AND CALCIUM CHLORIDE 125 ML/HR: .6; .31; .03; .02 INJECTION, SOLUTION INTRAVENOUS at 09:02

## 2024-05-26 RX ADMIN — DOCUSATE SODIUM 100 MG: 100 CAPSULE, LIQUID FILLED ORAL at 18:10

## 2024-05-26 RX ADMIN — PHENYLEPHRINE HYDROCHLORIDE 50 MCG/MIN: 50 INJECTION INTRAVENOUS at 02:04

## 2024-05-26 RX ADMIN — FENTANYL CITRATE 15 MCG: 50 INJECTION INTRAMUSCULAR; INTRAVENOUS at 02:04

## 2024-05-26 RX ADMIN — OXYTOCIN 250 MILLI-UNITS/MIN: 10 INJECTION INTRAVENOUS at 02:23

## 2024-05-26 RX ADMIN — HYDROMORPHONE HYDROCHLORIDE 0.5 MG: 1 INJECTION, SOLUTION INTRAMUSCULAR; INTRAVENOUS; SUBCUTANEOUS at 20:28

## 2024-05-26 RX ADMIN — KETOROLAC TROMETHAMINE 30 MG: 30 INJECTION, SOLUTION INTRAMUSCULAR; INTRAVENOUS at 18:10

## 2024-05-26 RX ADMIN — ONDANSETRON 4 MG: 2 INJECTION INTRAMUSCULAR; INTRAVENOUS at 05:06

## 2024-05-26 RX ADMIN — ACETAMINOPHEN 650 MG: 325 TABLET, FILM COATED ORAL at 23:58

## 2024-05-26 RX ADMIN — KETOROLAC TROMETHAMINE 30 MG: 30 INJECTION, SOLUTION INTRAMUSCULAR; INTRAVENOUS at 23:58

## 2024-05-26 RX ADMIN — ACETAMINOPHEN 650 MG: 325 TABLET, FILM COATED ORAL at 18:10

## 2024-05-26 RX ADMIN — CEFAZOLIN SODIUM 2000 MG: 2 SOLUTION INTRAVENOUS at 01:58

## 2024-05-26 RX ADMIN — PROMETHAZINE HYDROCHLORIDE 12.5 MG: 25 INJECTION INTRAMUSCULAR; INTRAVENOUS at 09:55

## 2024-05-26 RX ADMIN — SODIUM CHLORIDE, SODIUM LACTATE, POTASSIUM CHLORIDE, AND CALCIUM CHLORIDE 125 ML/HR: .6; .31; .03; .02 INJECTION, SOLUTION INTRAVENOUS at 13:20

## 2024-05-26 RX ADMIN — PANTOPRAZOLE SODIUM 40 MG: 40 TABLET, DELAYED RELEASE ORAL at 07:21

## 2024-05-26 RX ADMIN — OXYTOCIN 62.5 MILLI-UNITS/MIN: 10 INJECTION INTRAVENOUS at 05:06

## 2024-05-26 RX ADMIN — NALBUPHINE HYDROCHLORIDE 5 MG: 10 INJECTION, SOLUTION INTRAMUSCULAR; INTRAVENOUS; SUBCUTANEOUS at 16:19

## 2024-05-26 RX ADMIN — NALBUPHINE HYDROCHLORIDE 5 MG: 10 INJECTION, SOLUTION INTRAMUSCULAR; INTRAVENOUS; SUBCUTANEOUS at 12:35

## 2024-05-26 RX ADMIN — MORPHINE SULFATE 0.15 MG: 0.5 INJECTION, SOLUTION EPIDURAL; INTRATHECAL; INTRAVENOUS at 02:04

## 2024-05-26 RX ADMIN — BUPIVACAINE HYDROCHLORIDE IN DEXTROSE 2 ML: 7.5 INJECTION, SOLUTION SUBARACHNOID at 02:04

## 2024-05-26 RX ADMIN — LEVOTHYROXINE SODIUM 200 MCG: 100 TABLET ORAL at 10:09

## 2024-05-26 RX ADMIN — METOCLOPRAMIDE 5 MG: 5 INJECTION, SOLUTION INTRAMUSCULAR; INTRAVENOUS at 07:31

## 2024-05-26 RX ADMIN — ACETAMINOPHEN 650 MG: 325 TABLET, FILM COATED ORAL at 06:08

## 2024-05-26 NOTE — UTILIZATION REVIEW
Initial Clinical Review  OBS 05-25-24 @ 1223 CONVERTED TO INPATIENT 05-26-23 @ 0654 FOR VAGINAL BLEEDING @ 32 4/7 DAYS AND THE NEED FOR DELIVERY D/R VAGINAL BLEEDING IN A KNOWN PLACENTA PREVIA.    Admission: Date/Time/Statement:   Admission Orders (From admission, onward)       Ordered        05/26/24 0654  INPATIENT ADMISSION  Once            05/25/24 1223  Place in Observation  Once                          Orders Placed This Encounter   Procedures    Place in Observation     Standing Status:   Standing     Number of Occurrences:   1     Order Specific Question:   Level of Care     Answer:   Med Surg [16]    INPATIENT ADMISSION     Standing Status:   Standing     Number of Occurrences:   1     Order Specific Question:   Level of Care     Answer:   Med Surg [16]     Order Specific Question:   Estimated length of stay     Answer:   More than 2 Midnights     Order Specific Question:   Certification     Answer:   I certify that inpatient services are medically necessary for this patient for a duration of greater than two midnights. See H&P and MD Progress Notes for additional information about the patient's course of treatment.         Chief Complaint   Patient presents with    Vaginal Bleeding     Bright red bleeding        Initial Presentation: 28 y.o. female     Anticipated Length of Stay/Certification Statement:     Date:    Day 2:     Admitting Vitals   Temperature Pulse Respirations Blood Pressure SpO2   05/25/24 1633 05/25/24 1115 05/25/24 1115 05/25/24 1115 05/25/24 1115   98.2 °F (36.8 °C) 84 18 133/78 100 %      Temp Source Heart Rate Source Patient Position - Orthostatic VS BP Location FiO2 (%)   05/25/24 1633 05/25/24 1115 05/25/24 1115 05/25/24 1115 --   Oral Monitor Lying Right arm       Pain Score       05/25/24 1115       3          Wt Readings from Last 1 Encounters:   05/25/24 125 kg (275 lb)     Additional Vital Signs:   Pertinent Labs/Diagnostic Test Results:   No orders to display         Results  from last 7 days   Lab Units 05/26/24  1325 05/26/24  0143 05/25/24  1314 05/25/24  0950   WBC Thousand/uL 17.09* 15.25* 9.34 10.39*   HEMOGLOBIN g/dL 9.8* 11.3* 10.3* 10.7*   HEMATOCRIT % 30.7* 34.8 32.5* 33.1*   PLATELETS Thousands/uL 200 219 189 202   TOTAL NEUT ABS Thousands/µL  --  13.17*  --  7.55         Results from last 7 days   Lab Units 05/25/24  0950   SODIUM mmol/L 135   POTASSIUM mmol/L 3.8   CHLORIDE mmol/L 107   CO2 mmol/L 20*   ANION GAP mmol/L 8   BUN mg/dL 6   CREATININE mg/dL 0.50*   EGFR ml/min/1.73sq m 134   CALCIUM mg/dL 8.7     Results from last 7 days   Lab Units 05/25/24  0950   AST U/L 17   ALT U/L 11   ALK PHOS U/L 110*   TOTAL PROTEIN g/dL 6.7   ALBUMIN g/dL 3.2*   TOTAL BILIRUBIN mg/dL 0.30         Results from last 7 days   Lab Units 05/25/24  0950   GLUCOSE RANDOM mg/dL 82         Results from last 7 days   Lab Units 05/26/24  0143 05/25/24  1314   PROTIME seconds 12.5 12.8   INR  0.88 0.90   PTT seconds 23 24     Results from last 7 days   Lab Units 05/26/24  0212   UNIT PRODUCT CODE  [id number]  [id number]   UNIT NUMBER  [id number]  [id number]   UNITABO  O  O   UNITRH  NEG  NEG   CROSSMATCH  Compatible  Compatible   UNIT DISPENSE STATUS  Crossmatched  Crossmatched   UNIT PRODUCT VOL ml 350  350     Results from last 7 days   Lab Units 05/25/24  1355   CLARITY UA  Clear   COLOR UA  Light Yellow   SPEC GRAV UA  1.012   PH UA  7.0   GLUCOSE UA mg/dl Negative   KETONES UA mg/dl Negative   BLOOD UA  Moderate*   PROTEIN UA mg/dl Negative   NITRITE UA  Negative   BILIRUBIN UA  Negative   UROBILINOGEN UA (BE) mg/dl <2.0   LEUKOCYTES UA  Negative   WBC UA /hpf 1-2   RBC UA /hpf None Seen   BACTERIA UA /hpf Moderate*   EPITHELIAL CELLS WET PREP /hpf Occasional       Past Medical History:   Diagnosis Date    Anemia     Anxiety     Asthma     Colitis, chronic, ulcerative (HCC)     Disease of thyroid gland     Endometriosis     GERD (gastroesophageal reflux disease)     Hashimoto's  disease     Heartburn     Hyperthyroidism     Immune deficiency disorder (HCC)     Irregular menses 2022    Migraine     Palpitations 2022    Pneumonia     Rash     Ulcerative colitis (HCC)     crohn's disease, takes Humara weekly    Urinary tract infection     Urticaria     Varicella     disease as child and vaccinated     Present on Admission:   Asthma, moderate persistent   CVID (common variable immunodeficiency) (HCC)   Crohn's disease of colon without complication (HCC)   Hypothyroidism due to Hashimoto's thyroiditis   Rh negative state in antepartum period, third trimester   Iron deficiency anemia      Admitting Diagnosis: Vaginal bleeding during pregnancy, antepartum [O46.90]  Encounter for  delivery without indication [O82]  Age/Sex: 28 y.o. female  Admission Orders:  Scheduled Medications:  acetaminophen, 650 mg, Oral, Q6H NAKIA  [START ON 2024] adalimumab, 40 mg, Subcutaneous, Q7 Days  docusate sodium, 100 mg, Oral, BID  docusate sodium, 100 mg, Oral, BID  [START ON 2024] enoxaparin, 40 mg, Subcutaneous, Q12H  fluticasone, 1 puff, Inhalation, Daily  [START ON 2024] Hizentra, 14 g, Subcutaneous, Weekly  ketorolac, 30 mg, Intravenous, Q6H NAKIA   Followed by  [START ON 2024] ibuprofen, 600 mg, Oral, Q6H  levothyroxine, 200 mcg, Oral, Once per day on    And  [START ON 2024] levothyroxine, 100 mcg, Oral, Once per day on   pantoprazole, 40 mg, Oral, Daily Before Breakfast  prenatal multivitamin, 1 tablet, Oral, Daily      Continuous IV Infusions:  lactated ringers, 125 mL/hr, Intravenous, Continuous      PRN Meds:  albuterol, 2 puff, Inhalation, Q4H PRN  calcium carbonate, 1,000 mg, Oral, Daily PRN  diphenhydrAMINE, 25 mg, Oral, Q6H PRN  fentaNYL, 25 mcg, Intravenous, Q5 Min PRN  HYDROmorphone, 0.5 mg, Intravenous, Q10 Min PRN  HYDROmorphone, 0.5 mg, Intravenous, Q2H PRN  metoclopramide, 5 mg, Intravenous, Q6H  PRN  nalbuphine, 5 mg, Intravenous, Q3H PRN  naloxone, 0.1 mg, Intravenous, Q3 min PRN  ondansetron, 4 mg, Intravenous, Q6H PRN  [START ON 5/27/2024] ondansetron, 4 mg, Intravenous, Q8H PRN  [START ON 5/27/2024] oxyCODONE, 10 mg, Oral, Q4H PRN  [START ON 5/27/2024] oxyCODONE, 5 mg, Oral, Q4H PRN  simethicone, 80 mg, Oral, 4x Daily PRN        IP CONSULT TO NEONATOLOGY  IP CONSULT TO PERINATOLOGY    Network Utilization Review Department  ATTENTION: Please call with any questions or concerns to [phone] and carefully listen to the prompts so that you are directed to the right person. All voicemails are confidential.   For Discharge needs, contact Care Management DC Support Team at [phone] opt. [phone]  Send all requests for admission clinical reviews, approved or denied determinations and any other requests to dedicated fax number below belonging to the [facility] where the patient is receiving treatment. List of dedicated fax numbers for the Facilities:  FACILITY NAME UR FAX NUMBER   ADMISSION DENIALS (Administrative/Medical Necessity) [phone]   DISCHARGE SUPPORT TEAM (NETWORK) [phone]   PARENT CHILD HEALTH (Maternity/NICU/Pediatrics) [phone]   [facility] [phone]   [facility] [phone]   [facility] [phone]   [facility] [phone]   [facility] [phone]   [facility] [phone]   [facility] [phone]   [facility] [phone]   [facility] [phone]   [facility] [phone]   [facility] [phone]   [facility] [phone]

## 2024-05-26 NOTE — QUICK NOTE
Present at the bedside due to patient complaint of 6 out of 10 back pain. She complains abdominal cramping corresponding with contractions on monitoring. FHT remains category I. Contractions are q3-5 mins. Discussed Aqua K pad, tylenol and fluid bolus. Will give nifedipine 10mg q8h for 48hrs. Will continue with continuous monitoring and NPO status. Care discussed with Dr. Khalil, Dr. Paredes, nursing and NICU.     Pat Abdul MD  OBGYN PGY-3  05/25/24 9:28 PM

## 2024-05-26 NOTE — ANESTHESIA PREPROCEDURE EVALUATION
Procedure:   SECTION () (Uterus)    Relevant Problems   ANESTHESIA (within normal limits)      CARDIO   (+) Chronic migraine without aura without status migrainosus, not intractable      ENDO   (+) Hypothyroidism due to Hashimoto's thyroiditis      GI/HEPATIC   (+) GERD (gastroesophageal reflux disease)      GYN   (+) 32 weeks gestation of pregnancy      HEMATOLOGY   (+) Iron deficiency anemia   (+) Iron deficiency anemia during pregnancy      NEURO/PSYCH   (+) Chronic migraine without aura without status migrainosus, not intractable      PULMONARY   (+) Asthma, moderate persistent      FEN/Gastrointestinal   (+) Crohn's disease of colon without complication (HCC)   (+) IBS (irritable bowel syndrome)      Other   (+) CVID (common variable immunodeficiency) (HCC)       TTE 1/3/2023    Left Ventricle: Left ventricular cavity size is normal. Wall thickness is normal. The left ventricular ejection fraction is 64% by biplane measurement. Systolic function is normal. Wall motion is normal. Diastolic function is normal for age.    Right Ventricle: Right ventricular cavity size is normal. Systolic function is normal.    No clinically significant valvular abnormalities     Normal transthoracic echocardiogram.      Physical Exam    Airway    Mallampati score: II  TM Distance: >3 FB  Neck ROM: full     Dental   No notable dental hx     Cardiovascular      Pulmonary      Other Findings  post-pubertal.      Anesthesia Plan  ASA Score- 2     Anesthesia Type- spinal with ASA Monitors.         Additional Monitors:     Airway Plan:            Plan Factors-Exercise tolerance (METS): >4 METS.    Chart reviewed.   Existing labs reviewed. Patient summary reviewed.    Patient is not a current smoker.              Induction-     Postoperative Plan-     Perioperative Resuscitation Plan - Level 1 - Full Code.       Informed Consent- Anesthetic plan and risks discussed with patient.  I personally reviewed this patient with  the CRNA. Discussed and agreed on the Anesthesia Plan with the CRNA..  /

## 2024-05-26 NOTE — PLAN OF CARE
Problem: PAIN - ADULT  Goal: Verbalizes/displays adequate comfort level or baseline comfort level  Description: Interventions:  - Encourage patient to monitor pain and request assistance  - Assess pain using appropriate pain scale  - Administer analgesics based on type and severity of pain and evaluate response  - Implement non-pharmacological measures as appropriate and evaluate response  - Consider cultural and social influences on pain and pain management  - Notify physician/advanced practitioner if interventions unsuccessful or patient reports new pain  Outcome: Progressing     Problem: INFECTION - ADULT  Goal: Absence or prevention of progression during hospitalization  Description: INTERVENTIONS:  - Assess and monitor for signs and symptoms of infection  - Monitor lab/diagnostic results  - Monitor all insertion sites, i.e. indwelling lines, tubes, and drains  - Monitor endotracheal if appropriate and nasal secretions for changes in amount and color  - [place] appropriate cooling/warming therapies per order  - Administer medications as ordered  - Instruct and encourage patient and family to use good hand hygiene technique  - Identify and instruct in appropriate isolation precautions for identified infection/condition  Outcome: Progressing  Goal: Absence of fever/infection during neutropenic period  Description: INTERVENTIONS:  - Monitor WBC    Outcome: Progressing     Problem: SAFETY ADULT  Goal: Patient will remain free of falls  Description: INTERVENTIONS:  - Educate patient/family on patient safety including physical limitations  - Instruct patient to call for assistance with activity   - Consult OT/PT to assist with strengthening/mobility   - Keep Call bell within reach  - Keep bed low and locked with side rails adjusted as appropriate  - Keep care items and personal belongings within reach  - Initiate and maintain comfort rounds  - Make Fall Risk Sign visible to staff    - Apply yellow socks and  bracelet for high fall risk patients  - Consider moving patient to room near nurses station  Outcome: Progressing  Goal: Maintain or return to baseline ADL function  Description: INTERVENTIONS:  -  Assess patient's ability to carry out ADLs; assess patient's baseline for ADL function and identify physical deficits which impact ability to perform ADLs (bathing, care of mouth/teeth, toileting, grooming, dressing, etc.)  - Assess/evaluate cause of self-care deficits   - Assess range of motion  - Assess patient's mobility; develop plan if impaired  - Assess patient's need for assistive devices and provide as appropriate  - Encourage maximum independence but intervene and supervise when necessary  - Involve family in performance of ADLs  - Assess for home care needs following discharge   - Consider OT consult to assist with ADL evaluation and planning for discharge  - Provide patient education as appropriate  Outcome: Progressing  Goal: Maintains/Returns to pre admission functional level  Description: INTERVENTIONS:  - Perform AM-PAC 6 Click Basic Mobility/ Daily Activity assessment daily.  - Set and communicate daily mobility goal to care team and patient/family/caregiver.   - Collaborate with rehabilitation services on mobility goals if consulted    - Out of bed for toileting  - Record patient progress and toleration of activity level   Outcome: Progressing     Problem: DISCHARGE PLANNING  Goal: Discharge to home or other facility with appropriate resources  Description: INTERVENTIONS:  - Identify barriers to discharge w/patient and caregiver  - Arrange for needed discharge resources and transportation as appropriate  - Identify discharge learning needs (meds, wound care, etc.)  - Arrange for interpretive services to assist at discharge as needed  - Refer to Case Management Department for coordinating discharge planning if the patient needs post-hospital services based on physician/advanced practitioner order or  complex needs related to functional status, cognitive ability, or social support system  Outcome: Progressing     Problem: Knowledge Deficit  Goal: Patient/family/caregiver demonstrates understanding of disease process, treatment plan, medications, and discharge instructions  Description: Complete learning assessment and assess knowledge base.  Interventions:  - Provide teaching at level of understanding  - Provide teaching via preferred learning methods  Outcome: Progressing     Problem: ANTEPARTUM  Goal: Maintain pregnancy as long as maternal and/or fetal condition is stable  Description: INTERVENTIONS:  - Maternal surveillance  - Fetal surveillance  - Monitor uterine activity  - Medications as ordered  - Bedrest  Outcome: Completed     Problem: POSTPARTUM  Goal: Experiences normal postpartum course  Description: INTERVENTIONS:  - Monitor maternal vital signs  - Assess uterine involution and lochia  Outcome: Progressing  Goal: Appropriate maternal -  bonding  Description: INTERVENTIONS:  - Identify family support  - Assess for appropriate maternal/infant bonding   -Encourage maternal/infant bonding opportunities  - Referral to  or  as needed  Outcome: Progressing  Goal: Establishment of infant feeding pattern  Description: INTERVENTIONS:  - Assess breast/bottle feeding  - Refer to lactation as needed  Outcome: Progressing  Goal: Incision(s), wounds(s) or drain site(s) healing without S/S of infection  Description: INTERVENTIONS  - Assess and document dressing, incision, wound bed, drain sites and surrounding tissue  - Provide patient and family education    Outcome: Progressing     Problem: COPING  Goal: Pt/Family able to verbalize concerns and demonstrate effective coping strategies  Description: INTERVENTIONS:  - Assist patient/family to identify coping skills, available support systems and cultural and spiritual values  - Provide emotional support, including active listening and  acknowledgement of concerns of patient and caregivers  - Reduce environmental stimuli, as able  - Provide patient education  - Assess for spiritual pain/suffering and initiate spiritual care, including notification of Pastoral Care or jonas based community as needed  - Assess effectiveness of coping strategies  Outcome: Progressing  Goal: Will report anxiety at manageable levels  Description: INTERVENTIONS:  - Administer medication as ordered  - Teach and encourage coping skills  - Provide emotional support  - Assess patient/family for anxiety and ability to cope  Outcome: Progressing     Problem: DECISION MAKING  Goal: Pt/Family able to effectively weigh alternatives and participate in decision making related to treatment and care  Description: INTERVENTIONS:  - Identify decision maker  - Determine when there are differences among patient's view, family's view, and healthcare provider's view of patient condition and care goals  - Facilitate patient/family articulation of goals for care  - Help patient/family identify pros/cons of alternative solutions  - Provide information as requested by patient/family  - Respect patient/family rights related to privacy and sharing information   - Serve as a liaison between patient, family and health care team  - Initiate consults as appropriate (Ethics Team, Palliative Care, Family Care Conference, etc.)  Outcome: Progressing     Problem: SPIRITUAL CARE  Goal: Pt/Family able to move forward in process of forgiving self, others and/or higher power  Description: INTERVENTIONS:  - Assist patient with any spiritual needs/requests such as communion, confession, anointing, etc  - Explore guilt and help patient/family identify possible spiritual/cultural beliefs and values  - Explore possibilities of making amends & reconciliation with self, others, and/or a greater power  - Guide patient/family in identifying painful feelings  - Help patient explore and identify spiritual beliefs,  cultural understandings or values that may help or hinder letting go of issue  - Help patient explore feelings of anger, bitterness, resentment, anxiety   Help patient/family identify and examine the situation in which these feelings are experienced  - Help patient/family identify destructive displacement of feelings onto other individuals  - Refer patient to formal counseling and/or to jonas community for further support as needed or per request  Outcome: Progressing  Goal: Patient feels balance and connection with others and/or higher power that empowers the self during times of loss, guilt and fear  Description: INTERVENTIONS:  - Create safety for patient through empathic presence and non-judgmental listening  - Encourage patient to explore his/her values, beliefs and/or spiritual images and practices  - Encourage use of breath work, imagery, meditation, relaxation, reiki to ease distress and provide healing  - Encourage use of cultural and spiritual celebrations and rituals  - Facilitate discussion that helps patient sort out spiritual concerns  - Help patient identify where meaning/hope/comfort & strength are in his/her life  - Refer patient to jonas community for assistance, as appropriate  - Respond to patient/family need for prayer/ritual/sacrament/ceremony  Outcome: Progressing

## 2024-05-26 NOTE — PROGRESS NOTES
Antony Rodriguez  [id number]  5/26/2024  7:16 AM    POST-OP CHECK     S:  Antony Rodriguez doing ok. Pain moderately controlled. Had x1 episode of vomiting. Denies chest pain, shortness of breath.    O:  Vitals:    05/26/24 0655   BP: 121/63   Pulse: 90   Resp: 16   Temp: 97.9 °F (36.6 °C)   SpO2: 94%       Physical Exam  Vitals reviewed.   Constitutional:       Appearance: Normal appearance.   HENT:      Head: Normocephalic and atraumatic.   Eyes:      Extraocular Movements: Extraocular movements intact.   Cardiovascular:      Rate and Rhythm: Normal rate.      Pulses: Normal pulses.   Pulmonary:      Effort: Pulmonary effort is normal. No respiratory distress.   Abdominal:      Palpations: Abdomen is soft.      Tenderness: There is no abdominal tenderness.      Comments: Fundus at umbilicus   Musculoskeletal:         General: Normal range of motion.      Cervical back: Normal range of motion.   Skin:     General: Skin is warm and dry.   Neurological:      Mental Status: She is alert.   Psychiatric:         Mood and Affect: Mood normal.         Behavior: Behavior normal.           A:  Antony Rodriguez is s/p 1LTCS for placenta previa and vaginal bleeding.      P:  Routine postop check  IV/PO pain meds as needed  Regular diet as tolerated  Antiemetics for nausea/vomiting prn  Follow up noon labs  UOP of 0.3cc/kg/hr, continue to monitor, plan to remove this afternoon  SCDs for DVT ppx, encourage ambulation as tolerated.    Pat Abdul MD  OBGYN PGY-3  05/26/24 7:16 AM

## 2024-05-26 NOTE — PROGRESS NOTES
"Present at the bedside due to increasing vaginal bleeding that patient described as a \"gush\". Bright red blood seen on pad. Given acute bleeding, speculum exam was performed. Two medium sized clots were expelled approximately the size of an orange. She continued to have bright red vaginal bleeding. The cervical os was unable to be visualized. Given patient's ongoing bleeding, urgent  section is recommended. Reviewed risks and benefits. Patient is s/p x1 dose of betamethasone. Will place additional IV, T&C for 2u and proceed to the OR for urgent 1LTCS. FHT remained category I throughout. Dr. Khalil was notified and en route.     Pat Abdul MD  OBGYN PGY-3  24 1:26 AM  "

## 2024-05-26 NOTE — ASSESSMENT & PLAN NOTE
Meeting criteria postpartum  CBC, CMP wnl  Continue to monitor blood pressures    Systolic (24hrs), Av , Min:115 , Max:140   Diastolic (24hrs), Av, Min:75, Max:87

## 2024-05-26 NOTE — PLAN OF CARE
Problem: PAIN - ADULT  Goal: Verbalizes/displays adequate comfort level or baseline comfort level  Description: Interventions:  - Encourage patient to monitor pain and request assistance  - Assess pain using appropriate pain scale  - Administer analgesics based on type and severity of pain and evaluate response  - Implement non-pharmacological measures as appropriate and evaluate response  - Consider cultural and social influences on pain and pain management  - Notify physician/advanced practitioner if interventions unsuccessful or patient reports new pain  Outcome: Progressing     Problem: INFECTION - ADULT  Goal: Absence or prevention of progression during hospitalization  Description: INTERVENTIONS:  - Assess and monitor for signs and symptoms of infection  - Monitor lab/diagnostic results  - Monitor all insertion sites, i.e. indwelling lines, tubes, and drains  - Monitor endotracheal if appropriate and nasal secretions for changes in amount and color  - Stuart appropriate cooling/warming therapies per order  - Administer medications as ordered  - Instruct and encourage patient and family to use good hand hygiene technique  - Identify and instruct in appropriate isolation precautions for identified infection/condition  Outcome: Progressing  Goal: Absence of fever/infection during neutropenic period  Description: INTERVENTIONS:  - Monitor WBC    Outcome: Progressing     Problem: SAFETY ADULT  Goal: Patient will remain free of falls  Description: INTERVENTIONS:  - Educate patient/family on patient safety including physical limitations  - Instruct patient to call for assistance with activity   - Consult OT/PT to assist with strengthening/mobility   - Keep Call bell within reach  - Keep bed low and locked with side rails adjusted as appropriate  - Keep care items and personal belongings within reach  - Initiate and maintain comfort rounds  - Make Fall Risk Sign visible to staff  - Offer Toileting every  Hours,  in advance of need  - Initiate/Maintain alarm  - Obtain necessary fall risk management equipment:   - Apply yellow socks and bracelet for high fall risk patients  - Consider moving patient to room near nurses station  Outcome: Progressing  Goal: Maintain or return to baseline ADL function  Description: INTERVENTIONS:  -  Assess patient's ability to carry out ADLs; assess patient's baseline for ADL function and identify physical deficits which impact ability to perform ADLs (bathing, care of mouth/teeth, toileting, grooming, dressing, etc.)  - Assess/evaluate cause of self-care deficits   - Assess range of motion  - Assess patient's mobility; develop plan if impaired  - Assess patient's need for assistive devices and provide as appropriate  - Encourage maximum independence but intervene and supervise when necessary  - Involve family in performance of ADLs  - Assess for home care needs following discharge   - Consider OT consult to assist with ADL evaluation and planning for discharge  - Provide patient education as appropriate  Outcome: Progressing  Goal: Maintains/Returns to pre admission functional level  Description: INTERVENTIONS:  - Perform AM-PAC 6 Click Basic Mobility/ Daily Activity assessment daily.  - Set and communicate daily mobility goal to care team and patient/family/caregiver.   - Collaborate with rehabilitation services on mobility goals if consulted  - Perform Range of Motion  times a day.  - Reposition patient every  hours.  - Dangle patient  times a day  - Stand patient  times a day  - Ambulate patient  times a day  - Out of bed to chair  times a day   - Out of bed for meals times a day  - Out of bed for toileting  - Record patient progress and toleration of activity level   Outcome: Progressing     Problem: DISCHARGE PLANNING  Goal: Discharge to home or other facility with appropriate resources  Description: INTERVENTIONS:  - Identify barriers to discharge w/patient and caregiver  - Arrange for  needed discharge resources and transportation as appropriate  - Identify discharge learning needs (meds, wound care, etc.)  - Arrange for interpretive services to assist at discharge as needed  - Refer to Case Management Department for coordinating discharge planning if the patient needs post-hospital services based on physician/advanced practitioner order or complex needs related to functional status, cognitive ability, or social support system  Outcome: Progressing     Problem: Knowledge Deficit  Goal: Patient/family/caregiver demonstrates understanding of disease process, treatment plan, medications, and discharge instructions  Description: Complete learning assessment and assess knowledge base.  Interventions:  - Provide teaching at level of understanding  - Provide teaching via preferred learning methods  Outcome: Progressing     Problem: COPING  Goal: Pt/Family able to verbalize concerns and demonstrate effective coping strategies  Description: INTERVENTIONS:  - Assist patient/family to identify coping skills, available support systems and cultural and spiritual values  - Provide emotional support, including active listening and acknowledgement of concerns of patient and caregivers  - Reduce environmental stimuli, as able  - Provide patient education  - Assess for spiritual pain/suffering and initiate spiritual care, including notification of Pastoral Care or jonas based community as needed  - Assess effectiveness of coping strategies  Outcome: Progressing  Goal: Will report anxiety at manageable levels  Description: INTERVENTIONS:  - Administer medication as ordered  - Teach and encourage coping skills  - Provide emotional support  - Assess patient/family for anxiety and ability to cope  Outcome: Progressing     Problem: DECISION MAKING  Goal: Pt/Family able to effectively weigh alternatives and participate in decision making related to treatment and care  Description: INTERVENTIONS:  - Identify decision  maker  - Determine when there are differences among patient's view, family's view, and healthcare provider's view of patient condition and care goals  - Facilitate patient/family articulation of goals for care  - Help patient/family identify pros/cons of alternative solutions  - Provide information as requested by patient/family  - Respect patient/family rights related to privacy and sharing information   - Serve as a liaison between patient, family and health care team  - Initiate consults as appropriate (Ethics Team, Palliative Care, Family Care Conference, etc.)  Outcome: Progressing     Problem: SPIRITUAL CARE  Goal: Pt/Family able to move forward in process of forgiving self, others and/or higher power  Description: INTERVENTIONS:  - Assist patient with any spiritual needs/requests such as communion, confession, anointing, etc  - Explore guilt and help patient/family identify possible spiritual/cultural beliefs and values  - Explore possibilities of making amends & reconciliation with self, others, and/or a greater power  - Guide patient/family in identifying painful feelings  - Help patient explore and identify spiritual beliefs, cultural understandings or values that may help or hinder letting go of issue  - Help patient explore feelings of anger, bitterness, resentment, anxiety   Help patient/family identify and examine the situation in which these feelings are experienced  - Help patient/family identify destructive displacement of feelings onto other individuals  - Refer patient to formal counseling and/or to jonas community for further support as needed or per request  Outcome: Progressing  Goal: Patient feels balance and connection with others and/or higher power that empowers the self during times of loss, guilt and fear  Description: INTERVENTIONS:  - Create safety for patient through empathic presence and non-judgmental listening  - Encourage patient to explore his/her values, beliefs and/or spiritual  images and practices  - Encourage use of breath work, imagery, meditation, relaxation, reiki to ease distress and provide healing  - Encourage use of cultural and spiritual celebrations and rituals  - Facilitate discussion that helps patient sort out spiritual concerns  - Help patient identify where meaning/hope/comfort & strength are in his/her life  - Refer patient to jonas community for assistance, as appropriate  - Respond to patient/family need for prayer/ritual/sacrament/ceremony  Outcome: Progressing     Problem: POSTPARTUM  Goal: Experiences normal postpartum course  Description: INTERVENTIONS:  - Monitor maternal vital signs  - Assess uterine involution and lochia  Outcome: Progressing  Goal: Appropriate maternal -  bonding  Description: INTERVENTIONS:  - Identify family support  - Assess for appropriate maternal/infant bonding   -Encourage maternal/infant bonding opportunities  - Referral to  or  as needed  Outcome: Progressing  Goal: Establishment of infant feeding pattern  Description: INTERVENTIONS:  - Assess breast/bottle feeding  - Refer to lactation as needed  Outcome: Progressing  Goal: Incision(s), wounds(s) or drain site(s) healing without S/S of infection  Description: INTERVENTIONS  - Assess and document dressing, incision, wound bed, drain sites and surrounding tissue  - Provide patient and family education  - Perform skin care/dressing changes every   Outcome: Progressing

## 2024-05-26 NOTE — DISCHARGE SUMMARY
Discharge Summary - Antony Rodriguez 28 y.o. female MRN: [id number]    Unit/Bed#: LD PACU-01 Encounter: [id number]    Admission Date: 2024     Discharge Date: 24    Patient Active Problem List   Diagnosis    Chronic migraine without aura without status migrainosus, not intractable    Asthma, moderate persistent    Chronic rhinitis    Hypothyroidism due to Hashimoto's thyroiditis    IBS (irritable bowel syndrome)    Iron deficiency anemia    Unequal leg length    Vitamin D deficiency    GERD (gastroesophageal reflux disease)    ADHD (attention deficit hyperactivity disorder), combined type    CVID (common variable immunodeficiency) (HCC)    Endometriosis    Crohn's disease of colon without complication (HCC)    Status post primary low transverse  section    Iron deficiency anemia during pregnancy    32 weeks gestation of pregnancy    Rh negative state in antepartum period, third trimester    Placenta previa without hemorrhage, delivered, current hospitalization       OBGYN Practice: [facility] Course:   Antony Rodriguez is a 28 y.o.  who was admitted at 32w4d which initially presented for a second episode of vaginal bleeding in the setting of a placenta previa. She was admitted to antepartum for continuous monitoring. She received rhogam for Rh negative status. She also received one dose of betamethasone. Contractions increased and she received 10mg of nifedipine PO. Overnight she had a episode of heavy bright red bleeding with multiple clots. Decision was made to proceed to the OR for 1LTCS for placenta previa. She had an uncomplicated postpartum course.    Delivery Findings:  Antony delivered a viable male  on 2024 2:21 AM via , Low Transverse . The delivery was complicated by placenta previa.    Baby's Weight: 2310g; 5lbs 1.5oz    Apgar scores: 8 , 4 , and 10 at 1, 5, and 10 minutes, respectively  Anesthesia: Spinal,   QBL: 251cc     was  transferred to NICU. Patient tolerated the procedure well and was transferred to recovery in stable condition.     Her post-partum course was uncomplicated.  Her post-partum pain was well controlled with oral analgesics. On day of discharge she was ambulating, voiding spontaneously, tolerating oral intake and hemodynamically stable. Mom's blood type is O negative and  Rhogam was not given, as she received within 12hrs of delivery.    She was discharged home on postpartum day #3 without complications. Patient was instructed to follow up with her OB as an outpatient and was given appropriate warnings to call doctor or provider if she develops signs of infection or uncontrolled pain.    Discharge Problem List by Issue:   Gestational hypertension  Assessment & Plan  Meeting criteria postpartum  CBC, CMP wnl  Continue to monitor blood pressures    Systolic (24hrs), Av , Min:115 , Max:140   Diastolic (24hrs), Av, Min:75, Max:87        Rh negative state in antepartum period, third trimester  Assessment & Plan  Received Rhogam at 28w  Recommend additional dose of rhogam on admission     32 weeks gestation of pregnancy  Assessment & Plan  Delivered at 32w due to vaginal bleeding insetting of placenta previa    Crohn's disease of colon without complication (HCC)  Assessment & Plan  Weekly home Humira for crohn's disease    CVID (common variable immunodeficiency) ([facility])  Assessment & Plan  Weekly hizentra, dose adjusted to 11g per Dr. Durand note on  for postpartum    Iron deficiency anemia  Assessment & Plan  Can discontinue outpatient iron infusions.   Start iron supplement every other. Discussion precautions for constipation.     Hypothyroidism due to Hashimoto's thyroiditis  Assessment & Plan  Continue home levothyroxine    Asthma, moderate persistent  Assessment & Plan  Takes Budesonide inhaler  Avoid hemabate if patient were to hemorrhage    * Status post primary low transverse  section  Assessment &  Plan  Routine postpartum care  Encourage ambulation  Encourage familial bonding  Lactation support as needed  Pain: Motrin/Tylenol around the clock, oxycodone if needed  Pre-delivery Hgb 11.3 --> 9.8  Voiding spontaneously, s/p arreola  DVT Ppx: ambulation, SCDs, Lovenox 40mg BID         Disposition: Home    Planned Readmission: No    Discharge Medications:   Please see AVS    Discharge instructions :   -Do not place anything (no partner, tampons or douche) in your vagina for 6 weeks.  -You may walk for exercise for the first 6 weeks then gradually return to your usual activities.   -Please do not drive for 1 week if you have no stitches and for 2 weeks if you have stitches or underwent a  delivery.    -You may take baths or shower per your preference.   -Please look at your bust (breasts) in the mirror daily and call your doctor for redness or tenderness or increased warmth.   - If you have had a  section please look at your incision daily as well and call provider for increasing redness or steady drainage from the incision.   -Please call your doctor's office if temperature > 100.4*F or 38* C, worsening pain or a foul discharge.    Follow Up:  - Follow up in 1-2 weeks for postpartum visit    Pat Abdul MD  OBGYN PGY-3  24 8:20 PM

## 2024-05-26 NOTE — OP NOTE
OPERATIVE REPORT  PATIENT NAME: Antony Rodriguez    :  1995  MRN: [id number]  Pt Location: [facility] L&D OR ROOM 02    SURGERY DATE: 2024    Surgeons and Role:     * Diana Khalil DO - Primary     * Pat Abdul MD    Preop Diagnosis:  Placenta previa antepartum [O44.00]  Vaginal bleeding [N93.9]    Post-Op Diagnosis Codes:     * Placenta previa antepartum [O44.00]     * Vaginal bleeding [N93.9]    Procedure(s) (LRB):   SECTION () (N/A)    Specimen(s):  ID Type Source Tests Collected by Time Destination   A :  Cord Blood Cord BLOOD GAS, VENOUS, CORD, BLOOD GAS, ARTERIAL, CORD Diana Khalil,  2024 0139    B :  Tissue (Placenta on Hold) OB Only Placenta PLACENTA IN STORAGE Diana Khalil DO 2024 0206        Surgical QBL:  Surgical QBL (mL): 251 mL      Drains:  Urethral Catheter Non-latex;Straight-tip 16 Fr. (Active)   Site Assessment Clean;Skin intact 24   Collection Container Standard drainage bag 24   Number of days: 0       Anesthesia Type:   * No anesthesia type entered *    Operative Indications:  Placenta previa antepartum [O44.00]  Vaginal bleeding [N93.9]     Phil Group Classification System:  No Multiple pregnancy, No Transverse or oblique lie, No Breech lie, Gestational age is < 37 weeks +  is PHIL GROUP 10    Maternal Findings:  Normal uterus  Normal tubes and ovaries bilaterally  No adhesions     Findings:  Viable male weighing 5lbs 1.5oz;  Apgar scores of 8 at one minute, 4 at five minutes, and 9 at 10 minutes  Clear amniotic fluid  Normal placenta with 3-vessel cord inserted centrally    Arterial and Venous Gases:  Umbilical Cord Venous Blood Gas:  Results from last 7 days   Lab Units 24   PH COV  7.229   PCO2 COV mm HG 49.3*   HCO3 COV mmol/L 20.1   BASE EXC COV mmol/L -7.6*   O2 CT CD VB mL/dL 5.5   O2 HGB, VENOUS CORD % 26.1     Umbilical Cord Arterial Blood Gas:  Results from  last 7 days   Lab Units 24  0139   PH COA  7.258   PCO2 COA  42.7   PO2 COA mm HG 15.7   HCO3 COA mmol/L 18.6   BASE EXC COA mmol/L -8.1*   O2 CONTENT CORD ART ml/dl 6.6   O2 HGB, ARTERIAL CORD % 31.2       Complications:  None; patient tolerated the procedure well.           Disposition: PACU            Condition: stable    Brief Labor Course:   Antony is a 27 yo  at 32w4d which initially presented for a second episode of vaginal bleeding in the setting of a placenta previa. She was admitted to antepartum for continuous monitoring. She received rhogam for Rh negative status. She also received one dose of betamethasone. Contractions increased and she received 10mg of nifedipine PO. Overnight she had a episode of heavy bright red bleeding with multiple clots. Decision was made to proceed to the OR for 1LTCS for placenta previa.     Procedure Details   The patient was seen prior to the procedure. Risks, benefits, possible complications, alternate treatment options, and expected outcomes were discussed with the patient.  The patient agreed with the proposed plan and gave informed consent for a 1LTCS.      The patient was taken to the OB Operating Room where she received spinal anesthesia. For infection prophylaxis, she received 3g Ancef and 500mg of Azithromycin preoperatively. Fetal heart tones in the OR were assessed and noted to be category I (FHT 150s) and a Mirelse catheter and SCDs were placed. The abdomen was prepped with Chloraprep, the vagina was prepped with Chlorhexadine, and following appropriate drying time, the patient was draped in the usual sterile manner.   A Time Out was held and the above information confirmed.  The patient was identified as Antony Rodriguez and the procedure verified as a  Delivery for vaginal bleeding in the setting of placenta previa.    A Pfannenstiel incision was made and carried down through the underlying subcutaneous tissue to the fascia using a scalpel. The  rectus fascia was then nicked in the midline and dissected laterally using Quiros scissors. The superior edge of the  fascial incision was grasped with Kocher clamps bilaterally, tented upward and the underlying rectus muscles were dissected off sharply with Quiros scissors. This was repeated on the inferior edge of the fascia and dissected down to the pubic rami. The rectus muscles were  and the peritoneum was identified, entered, and extended longitudinally with blunt dissection. The bladder blade was inserted. A low transverse uterine incision was made with the scalpel and extended laterally with blunt dissection. The amnion was entered bluntly.      The fetal head was palpated, elevated, and delivered through the uterine incision followed by the body without difficulty. There was noted to be spontaneous cry and good tone. There was no apparent injury to the . The umbilical cord was doubly clamped and cut after 30 seconds to allow for delayed cord clamping. The infant was handed off to the  providers.  Arterial and venous cord gases, cord blood, and a segment of umbilical cord were obtained for evaluation.  The placenta delivered spontaneously with uterine fundal massage and appeared normal. The uterus was exteriorized and cleaned out with a moist lap sponge.     The uterine incision was closed with a running locked suture of 0 Monocryl. A second layer of the same suture was used to imbricate the first.  Hemostasis was noted to be excellent. The posterior culdesac was cleared of all clots and debris with suction. The uterus was returned to the abdomen. The paracolic gutters were inspected and cleared of all clots and debris with moist lap sponges. The peritoneum was closed with 2-0 plain gut in a pursestring fashion. The fascia was closed with a running suture of 0 Vicryl. Subcutaneous adipose tissue was closed with a running suture of 2-0 Plain gut.  The skin was closed with a subcuticular  running suture of 4-0 Monocryl.  Exofin was applied.      The patient appeared to tolerate the procedure very well.  Lap sponge, needle, and instrument counts were correct x2.  The patient was transferred to her postpartum recovery room in stable condition and her infant went to the NICU.    Attending Attestation: Dr. Diana Khalil DO was present for the entire procedure.    Pat Abdul MD  OB/GYN PGY-3  5/26/2024  3:05 AM

## 2024-05-26 NOTE — PLAN OF CARE
Problem: PAIN - ADULT  Goal: Verbalizes/displays adequate comfort level or baseline comfort level  Description: Interventions:  - Encourage patient to monitor pain and request assistance  - Assess pain using appropriate pain scale  - Administer analgesics based on type and severity of pain and evaluate response  - Implement non-pharmacological measures as appropriate and evaluate response  - Consider cultural and social influences on pain and pain management  - Notify physician/advanced practitioner if interventions unsuccessful or patient reports new pain  Outcome: Progressing     Problem: INFECTION - ADULT  Goal: Absence or prevention of progression during hospitalization  Description: INTERVENTIONS:  - Assess and monitor for signs and symptoms of infection  - Monitor lab/diagnostic results  - Monitor all insertion sites, i.e. indwelling lines, tubes, and drains  - Monitor endotracheal if appropriate and nasal secretions for changes in amount and color  - [place] appropriate cooling/warming therapies per order  - Administer medications as ordered  - Instruct and encourage patient and family to use good hand hygiene technique  - Identify and instruct in appropriate isolation precautions for identified infection/condition  Outcome: Progressing     Problem: SAFETY ADULT  Goal: Patient will remain free of falls  Description: INTERVENTIONS:  - Educate patient/family on patient safety including physical limitations  - Instruct patient to call for assistance with activity   - Consult OT/PT to assist with strengthening/mobility   - Keep Call bell within reach  - Keep bed low and locked with side rails adjusted as appropriate  - Keep care items and personal belongings within reach  - Initiate and maintain comfort rounds  - Make Fall Risk Sign visible to staff  - Apply yellow socks and bracelet for high fall risk patients  - Consider moving patient to room near nurses station  Outcome: Progressing  Goal: Maintain or  return to baseline ADL function  Description: INTERVENTIONS:  -  Assess patient's ability to carry out ADLs; assess patient's baseline for ADL function and identify physical deficits which impact ability to perform ADLs (bathing, care of mouth/teeth, toileting, grooming, dressing, etc.)  - Assess/evaluate cause of self-care deficits   - Assess range of motion  - Assess patient's mobility; develop plan if impaired  - Assess patient's need for assistive devices and provide as appropriate  - Encourage maximum independence but intervene and supervise when necessary  - Involve family in performance of ADLs  - Assess for home care needs following discharge   - Consider OT consult to assist with ADL evaluation and planning for discharge  - Provide patient education as appropriate  Outcome: Progressing  Goal: Maintains/Returns to pre admission functional level  Description: INTERVENTIONS:  - Perform AM-PAC 6 Click Basic Mobility/ Daily Activity assessment daily.  - Set and communicate daily mobility goal to care team and patient/family/caregiver.   - Out of bed for toileting  - Record patient progress and toleration of activity level   Outcome: Progressing     Problem: DISCHARGE PLANNING  Goal: Discharge to home or other facility with appropriate resources  Description: INTERVENTIONS:  - Identify barriers to discharge w/patient and caregiver  - Arrange for needed discharge resources and transportation as appropriate  - Identify discharge learning needs (meds, wound care, etc.)  - Arrange for interpretive services to assist at discharge as needed  - Refer to Case Management Department for coordinating discharge planning if the patient needs post-hospital services based on physician/advanced practitioner order or complex needs related to functional status, cognitive ability, or social support system  Outcome: Progressing     Problem: Knowledge Deficit  Goal: Patient/family/caregiver demonstrates understanding of disease  process, treatment plan, medications, and discharge instructions  Description: Complete learning assessment and assess knowledge base.  Interventions:  - Provide teaching at level of understanding  - Provide teaching via preferred learning methods  Outcome: Progressing     Problem: ANTEPARTUM  Goal: Maintain pregnancy as long as maternal and/or fetal condition is stable  Description: INTERVENTIONS:  - Maternal surveillance  - Fetal surveillance  - Monitor uterine activity  - Medications as ordered  - Bedrest  Outcome: Progressing     Problem: COPING  Goal: Pt/Family able to verbalize concerns and demonstrate effective coping strategies  Description: INTERVENTIONS:  - Assist patient/family to identify coping skills, available support systems and cultural and spiritual values  - Provide emotional support, including active listening and acknowledgement of concerns of patient and caregivers  - Reduce environmental stimuli, as able  - Provide patient education  - Assess for spiritual pain/suffering and initiate spiritual care, including notification of Pastoral Care or jonas based community as needed  - Assess effectiveness of coping strategies  Outcome: Progressing  Goal: Will report anxiety at manageable levels  Description: INTERVENTIONS:  - Administer medication as ordered  - Teach and encourage coping skills  - Provide emotional support  - Assess patient/family for anxiety and ability to cope  Outcome: Progressing     Problem: DECISION MAKING  Goal: Pt/Family able to effectively weigh alternatives and participate in decision making related to treatment and care  Description: INTERVENTIONS:  - Identify decision maker  - Determine when there are differences among patient's view, family's view, and healthcare provider's view of patient condition and care goals  - Facilitate patient/family articulation of goals for care  - Help patient/family identify pros/cons of alternative solutions  - Provide information as  requested by patient/family  - Respect patient/family rights related to privacy and sharing information   - Serve as a liaison between patient, family and health care team  - Initiate consults as appropriate (Ethics Team, Palliative Care, Family Care Conference, etc.)  Outcome: Progressing     Problem: SPIRITUAL CARE  Goal: Pt/Family able to move forward in process of forgiving self, others and/or higher power  Description: INTERVENTIONS:  - Assist patient with any spiritual needs/requests such as communion, confession, anointing, etc  - Explore guilt and help patient/family identify possible spiritual/cultural beliefs and values  - Explore possibilities of making amends & reconciliation with self, others, and/or a greater power  - Guide patient/family in identifying painful feelings  - Help patient explore and identify spiritual beliefs, cultural understandings or values that may help or hinder letting go of issue  - Help patient explore feelings of anger, bitterness, resentment, anxiety   Help patient/family identify and examine the situation in which these feelings are experienced  - Help patient/family identify destructive displacement of feelings onto other individuals  - Refer patient to formal counseling and/or to jonas community for further support as needed or per request  Outcome: Progressing  Goal: Patient feels balance and connection with others and/or higher power that empowers the self during times of loss, guilt and fear  Description: INTERVENTIONS:  - Create safety for patient through empathic presence and non-judgmental listening  - Encourage patient to explore his/her values, beliefs and/or spiritual images and practices  - Encourage use of breath work, imagery, meditation, relaxation, reiki to ease distress and provide healing  - Encourage use of cultural and spiritual celebrations and rituals  - Facilitate discussion that helps patient sort out spiritual concerns  - Help patient identify where  meaning/hope/comfort & strength are in his/her life  - Refer patient to jonas community for assistance, as appropriate  - Respond to patient/family need for prayer/ritual/sacrament/ceremony  Outcome: Progressing

## 2024-05-26 NOTE — ANESTHESIA PROCEDURE NOTES
Spinal Block    Patient location during procedure: OR  Start time: 5/26/2024 2:04 AM  Reason for block: procedure for pain and at surgeon's request  Staffing  Performed by: Donovan Botello DO  Authorized by: Donovan Botello DO    Preanesthetic Checklist  Completed: patient identified, IV checked, site marked, risks and benefits discussed, surgical consent, monitors and equipment checked, pre-op evaluation and timeout performed  Spinal Block  Patient position: sitting  Prep: ChloraPrep and site prepped and draped  Patient monitoring: frequent blood pressure checks, continuous pulse ox and heart rate  Approach: midline  Location: L3-4  Needle  Needle type: Pencan   Needle gauge: 24 G  Needle length: 4 in  Assessment  Sensory level: T4  Injection Assessment:  negative aspiration for heme, no paresthesia on injection and positive aspiration for clear CSF.  Post-procedure:  site cleaned

## 2024-05-26 NOTE — ANESTHESIA POSTPROCEDURE EVALUATION
Post-Op Assessment Note    CV Status:  Stable    Pain management: adequate       Mental Status:  Alert and awake   Hydration Status:  Euvolemic   PONV Controlled:  Controlled   Airway Patency:  Patent     Post Op Vitals Reviewed: Yes    No anethesia notable event occurred.    Staff: CRNA               BP   119/58   Temp   98.5   Pulse  105   Resp   16   SpO2   100

## 2024-05-26 NOTE — PROGRESS NOTES
"Obstetrics & Gynecology Progress Note  Antony Rodriguez 28 y.o. female MRN: [id number]  Unit/Bed#: -01 Encounter: [id number]      SUBJECTIVE:    Pain well controlled. She is tolerating PO intake. Voiding status: arreola in place, urine output adequate after fluid bolus given. Flatus: none. Bowel movement: none. Chest pain/shortness of breath: none. Calf pain: none.    OBJECTIVE:  Vitals:   /82 (BP Location: Left arm)   Pulse 71   Temp 98.2 °F (36.8 °C) (Oral)   Resp 18   Ht 5' 6\" (1.676 m)   Wt 125 kg (275 lb)   LMP 10/10/2023 (Exact Date)   SpO2 98%   Breastfeeding Unknown   BMI 44.39 kg/m²       Intake/Output Summary (Last 24 hours) at 5/26/2024 1554  Last data filed at 5/26/2024 1431  Gross per 24 hour   Intake 3000 ml   Output 1251 ml   Net 1749 ml       Invasive Devices       Peripheral Intravenous Line  Duration             Peripheral IV 05/25/24 Left Antecubital 1 day    Peripheral IV 05/26/24 Right;Ventral (anterior) Forearm <1 day              Drain  Duration             Urethral Catheter Non-latex;Straight-tip 16 Fr. <1 day                    Physical Exam:   GEN: appears well, alert and oriented x 3, pleasant and cooperative   CARDIAC: regular rate and rhythm  PULMONARY: lungs clear to auscultation  ABDOMEN: soft, no tenderness, no distention, fundus firm below umbilicus, Incision dressed in pressure dressing  EXTREMITIES: SCDs on and pumping, nontender    ASSESSMENT/PLAN:  Postop Day #0 s/p 1LTCS, stable. Continue routine postoperative care.  -Out of bed/ ambulation as tolerated   -Regular diet  -Pain control with PO meds  -anticipate arreola removal and void trial      Nina Kathleen MD  PGY 1, Obstetrics and Gynecology  5/26/2024  3:56 PM      "

## 2024-05-27 PROBLEM — Z98.891 STATUS POST PRIMARY LOW TRANSVERSE CESAREAN SECTION: Chronic | Status: ACTIVE | Noted: 2024-03-08

## 2024-05-27 LAB
ABO GROUP BLD: NORMAL
ALBUMIN SERPL BCP-MCNC: 2.8 G/DL (ref 3.5–5)
ALP SERPL-CCNC: 83 U/L (ref 34–104)
ALT SERPL W P-5'-P-CCNC: 9 U/L (ref 7–52)
ANION GAP SERPL CALCULATED.3IONS-SCNC: 6 MMOL/L (ref 4–13)
AST SERPL W P-5'-P-CCNC: 15 U/L (ref 13–39)
BACTERIA UR CULT: NORMAL
BILIRUB SERPL-MCNC: 0.25 MG/DL (ref 0.2–1)
BLD GP AB SCN SERPL QL: POSITIVE
BUN SERPL-MCNC: 8 MG/DL (ref 5–25)
CALCIUM ALBUM COR SERPL-MCNC: 9.4 MG/DL (ref 8.3–10.1)
CALCIUM SERPL-MCNC: 8.4 MG/DL (ref 8.4–10.2)
CHLORIDE SERPL-SCNC: 106 MMOL/L (ref 96–108)
CO2 SERPL-SCNC: 25 MMOL/L (ref 21–32)
CREAT SERPL-MCNC: 0.62 MG/DL (ref 0.6–1.3)
FETAL CELL SCN BLD QL ROSETTE: NEGATIVE
GFR SERPL CREATININE-BSD FRML MDRD: 124 ML/MIN/1.73SQ M
GLUCOSE SERPL-MCNC: 84 MG/DL (ref 65–140)
POTASSIUM SERPL-SCNC: 3.8 MMOL/L (ref 3.5–5.3)
PROT SERPL-MCNC: 5.8 G/DL (ref 6.4–8.4)
RH BLD: NEGATIVE
SODIUM SERPL-SCNC: 137 MMOL/L (ref 135–147)
TREPONEMA PALLIDUM IGG+IGM AB [PRESENCE] IN SERUM OR PLASMA BY IMMUNOASSAY: NORMAL

## 2024-05-27 PROCEDURE — 86900 BLOOD TYPING SEROLOGIC ABO: CPT

## 2024-05-27 PROCEDURE — 80053 COMPREHEN METABOLIC PANEL: CPT

## 2024-05-27 PROCEDURE — 86850 RBC ANTIBODY SCREEN: CPT

## 2024-05-27 PROCEDURE — 99024 POSTOP FOLLOW-UP VISIT: CPT | Performed by: OBSTETRICS & GYNECOLOGY

## 2024-05-27 PROCEDURE — 85461 HEMOGLOBIN FETAL: CPT

## 2024-05-27 PROCEDURE — 86901 BLOOD TYPING SEROLOGIC RH(D): CPT

## 2024-05-27 RX ADMIN — LEVOTHYROXINE SODIUM 100 MCG: 100 TABLET ORAL at 06:39

## 2024-05-27 RX ADMIN — ACETAMINOPHEN 650 MG: 325 TABLET, FILM COATED ORAL at 18:21

## 2024-05-27 RX ADMIN — KETOROLAC TROMETHAMINE 30 MG: 30 INJECTION, SOLUTION INTRAMUSCULAR; INTRAVENOUS at 06:39

## 2024-05-27 RX ADMIN — ACETAMINOPHEN 650 MG: 325 TABLET, FILM COATED ORAL at 06:39

## 2024-05-27 RX ADMIN — KETOROLAC TROMETHAMINE 30 MG: 30 INJECTION, SOLUTION INTRAMUSCULAR; INTRAVENOUS at 18:21

## 2024-05-27 RX ADMIN — ENOXAPARIN SODIUM 40 MG: 40 INJECTION SUBCUTANEOUS at 20:43

## 2024-05-27 RX ADMIN — NALBUPHINE HYDROCHLORIDE 5 MG: 10 INJECTION, SOLUTION INTRAMUSCULAR; INTRAVENOUS; SUBCUTANEOUS at 00:02

## 2024-05-27 RX ADMIN — ACETAMINOPHEN 650 MG: 325 TABLET, FILM COATED ORAL at 23:56

## 2024-05-27 RX ADMIN — KETOROLAC TROMETHAMINE 30 MG: 30 INJECTION, SOLUTION INTRAMUSCULAR; INTRAVENOUS at 12:42

## 2024-05-27 RX ADMIN — PANTOPRAZOLE SODIUM 40 MG: 40 TABLET, DELAYED RELEASE ORAL at 06:40

## 2024-05-27 RX ADMIN — DOCUSATE SODIUM 100 MG: 100 CAPSULE, LIQUID FILLED ORAL at 10:10

## 2024-05-27 RX ADMIN — ENOXAPARIN SODIUM 40 MG: 40 INJECTION SUBCUTANEOUS at 10:10

## 2024-05-27 RX ADMIN — ACETAMINOPHEN 650 MG: 325 TABLET, FILM COATED ORAL at 12:42

## 2024-05-27 RX ADMIN — DOCUSATE SODIUM 100 MG: 100 CAPSULE, LIQUID FILLED ORAL at 18:22

## 2024-05-27 RX ADMIN — Medication 1 TABLET: at 10:10

## 2024-05-27 RX ADMIN — IBUPROFEN 600 MG: 600 TABLET, FILM COATED ORAL at 23:56

## 2024-05-27 RX ADMIN — SIMETHICONE 80 MG: 80 TABLET, CHEWABLE ORAL at 23:55

## 2024-05-27 NOTE — LACTATION NOTE
Met with Antony who is pumping for her baby boy in NICU.     Mom provided with and discussed RSB, Hand expression and Increasing Supply for NICU baby handouts.     Reviewed pumping log and expectations for pumping output in the first week. Reviewed cycle pumping and appropriate pump settings, as well as pumping for 15-20 min 8-12 times per day.     Antony is complaining of pain with pumping. Nipples were measured and flange size was 25 mm. Stayed with her for a pumping session. We did massage mode and then decreased speed to 50 and started suction at 20. Antony reported that the right side was pinching. Provided her with a 28 mm flange for the right breast to see if she could tolerate the larger flange. Was able to increase suction to 25, using the 28 on the right side and the 25 on the left side. Mom's nipples are very sensitive. Provided her with lanolin and breast shells to use between pumping to see if this will help the nipple tenderness. Instructed her to call out to Lactation in the morning for additional support.      Encouraged  Mom to discuss putting baby to the breast with the NICU team when baby is medically stable to do so and to pump when visiting baby at bedside when possible.

## 2024-05-27 NOTE — PLAN OF CARE
Problem: PAIN - ADULT  Goal: Verbalizes/displays adequate comfort level or baseline comfort level  Description: Interventions:  - Encourage patient to monitor pain and request assistance  - Assess pain using appropriate pain scale  - Administer analgesics based on type and severity of pain and evaluate response  - Implement non-pharmacological measures as appropriate and evaluate response  - Consider cultural and social influences on pain and pain management  - Notify physician/advanced practitioner if interventions unsuccessful or patient reports new pain  Outcome: Progressing     Problem: INFECTION - ADULT  Goal: Absence or prevention of progression during hospitalization  Description: INTERVENTIONS:  - Assess and monitor for signs and symptoms of infection  - Monitor lab/diagnostic results  - Monitor all insertion sites, i.e. indwelling lines, tubes, and drains  - Monitor endotracheal if appropriate and nasal secretions for changes in amount and color  - [place] appropriate cooling/warming therapies per order  - Administer medications as ordered  - Instruct and encourage patient and family to use good hand hygiene technique  - Identify and instruct in appropriate isolation precautions for identified infection/condition  Outcome: Progressing  Goal: Absence of fever/infection during neutropenic period  Description: INTERVENTIONS:  - Monitor WBC    Outcome: Progressing     Problem: SAFETY ADULT  Goal: Patient will remain free of falls  Description: INTERVENTIONS:  - Educate patient/family on patient safety including physical limitations  - Instruct patient to call for assistance with activity   - Consult OT/PT to assist with strengthening/mobility   - Keep Call bell within reach  - Keep bed low and locked with side rails adjusted as appropriate  - Keep care items and personal belongings within reach  - Initiate and maintain comfort rounds  - Make Fall Risk Sign visible to staff  - Offer Toileting every  Hours,  in advance of need  - Initiate/Maintain alarm  - Obtain necessary fall risk management equipment:   - Apply yellow socks and bracelet for high fall risk patients  - Consider moving patient to room near nurses station  Outcome: Progressing  Goal: Maintain or return to baseline ADL function  Description: INTERVENTIONS:  -  Assess patient's ability to carry out ADLs; assess patient's baseline for ADL function and identify physical deficits which impact ability to perform ADLs (bathing, care of mouth/teeth, toileting, grooming, dressing, etc.)  - Assess/evaluate cause of self-care deficits   - Assess range of motion  - Assess patient's mobility; develop plan if impaired  - Assess patient's need for assistive devices and provide as appropriate  - Encourage maximum independence but intervene and supervise when necessary  - Involve family in performance of ADLs  - Assess for home care needs following discharge   - Consider OT consult to assist with ADL evaluation and planning for discharge  - Provide patient education as appropriate  Outcome: Progressing  Goal: Maintains/Returns to pre admission functional level  Description: INTERVENTIONS:  - Perform AM-PAC 6 Click Basic Mobility/ Daily Activity assessment daily.  - Set and communicate daily mobility goal to care team and patient/family/caregiver.   - Collaborate with rehabilitation services on mobility goals if consulted  - Perform Range of Motion  times a day.  - Reposition patient every  hours.  - Dangle patient  times a day  - Stand patient times a day  - Ambulate patient  times a day  - Out of bed to chair  times a day   - Out of bed for meals  times a day  - Out of bed for toileting  - Record patient progress and toleration of activity level   Outcome: Progressing     Problem: DISCHARGE PLANNING  Goal: Discharge to home or other facility with appropriate resources  Description: INTERVENTIONS:  - Identify barriers to discharge w/patient and caregiver  - Arrange for  needed discharge resources and transportation as appropriate  - Identify discharge learning needs (meds, wound care, etc.)  - Arrange for interpretive services to assist at discharge as needed  - Refer to Case Management Department for coordinating discharge planning if the patient needs post-hospital services based on physician/advanced practitioner order or complex needs related to functional status, cognitive ability, or social support system  Outcome: Progressing     Problem: Knowledge Deficit  Goal: Patient/family/caregiver demonstrates understanding of disease process, treatment plan, medications, and discharge instructions  Description: Complete learning assessment and assess knowledge base.  Interventions:  - Provide teaching at level of understanding  - Provide teaching via preferred learning methods  Outcome: Progressing     Problem: COPING  Goal: Pt/Family able to verbalize concerns and demonstrate effective coping strategies  Description: INTERVENTIONS:  - Assist patient/family to identify coping skills, available support systems and cultural and spiritual values  - Provide emotional support, including active listening and acknowledgement of concerns of patient and caregivers  - Reduce environmental stimuli, as able  - Provide patient education  - Assess for spiritual pain/suffering and initiate spiritual care, including notification of Pastoral Care or jonas based community as needed  - Assess effectiveness of coping strategies  Outcome: Progressing  Goal: Will report anxiety at manageable levels  Description: INTERVENTIONS:  - Administer medication as ordered  - Teach and encourage coping skills  - Provide emotional support  - Assess patient/family for anxiety and ability to cope  Outcome: Progressing     Problem: DECISION MAKING  Goal: Pt/Family able to effectively weigh alternatives and participate in decision making related to treatment and care  Description: INTERVENTIONS:  - Identify decision  maker  - Determine when there are differences among patient's view, family's view, and healthcare provider's view of patient condition and care goals  - Facilitate patient/family articulation of goals for care  - Help patient/family identify pros/cons of alternative solutions  - Provide information as requested by patient/family  - Respect patient/family rights related to privacy and sharing information   - Serve as a liaison between patient, family and health care team  - Initiate consults as appropriate (Ethics Team, Palliative Care, Family Care Conference, etc.)  Outcome: Progressing     Problem: SPIRITUAL CARE  Goal: Pt/Family able to move forward in process of forgiving self, others and/or higher power  Description: INTERVENTIONS:  - Assist patient with any spiritual needs/requests such as communion, confession, anointing, etc  - Explore guilt and help patient/family identify possible spiritual/cultural beliefs and values  - Explore possibilities of making amends & reconciliation with self, others, and/or a greater power  - Guide patient/family in identifying painful feelings  - Help patient explore and identify spiritual beliefs, cultural understandings or values that may help or hinder letting go of issue  - Help patient explore feelings of anger, bitterness, resentment, anxiety   Help patient/family identify and examine the situation in which these feelings are experienced  - Help patient/family identify destructive displacement of feelings onto other individuals  - Refer patient to formal counseling and/or to jonas community for further support as needed or per request  Outcome: Progressing  Goal: Patient feels balance and connection with others and/or higher power that empowers the self during times of loss, guilt and fear  Description: INTERVENTIONS:  - Create safety for patient through empathic presence and non-judgmental listening  - Encourage patient to explore his/her values, beliefs and/or spiritual  images and practices  - Encourage use of breath work, imagery, meditation, relaxation, reiki to ease distress and provide healing  - Encourage use of cultural and spiritual celebrations and rituals  - Facilitate discussion that helps patient sort out spiritual concerns  - Help patient identify where meaning/hope/comfort & strength are in his/her life  - Refer patient to jonas community for assistance, as appropriate  - Respond to patient/family need for prayer/ritual/sacrament/ceremony  Outcome: Progressing     Problem: POSTPARTUM  Goal: Experiences normal postpartum course  Description: INTERVENTIONS:  - Monitor maternal vital signs  - Assess uterine involution and lochia  Outcome: Progressing  Goal: Appropriate maternal -  bonding  Description: INTERVENTIONS:  - Identify family support  - Assess for appropriate maternal/infant bonding   -Encourage maternal/infant bonding opportunities  - Referral to  or  as needed  Outcome: Progressing  Goal: Establishment of infant feeding pattern  Description: INTERVENTIONS:  - Assess breast/bottle feeding  - Refer to lactation as needed  Outcome: Progressing  Goal: Incision(s), wounds(s) or drain site(s) healing without S/S of infection  Description: INTERVENTIONS  - Assess and document dressing, incision, wound bed, drain sites and surrounding tissue  - Provide patient and family education  - Perform skin care/dressing changes every   Outcome: Progressing

## 2024-05-27 NOTE — LACTATION NOTE
Antony is pumping her her baby in NICU, she is not yet able to do skin to skin or latch baby.    Encouraged 8-12 milk removals in 24 hours, ideally q2-3hrs during the day and at least 1-2 times over night not going longer than 5hrs between nighttime milk removals.    Discussed flange sizing and recommended smaller flanges.    Encouraged Antony to call for assistance as needed.

## 2024-05-27 NOTE — PLAN OF CARE
Problem: PAIN - ADULT  Goal: Verbalizes/displays adequate comfort level or baseline comfort level  Description: Interventions:  - Encourage patient to monitor pain and request assistance  - Assess pain using appropriate pain scale  - Administer analgesics based on type and severity of pain and evaluate response  - Implement non-pharmacological measures as appropriate and evaluate response  - Consider cultural and social influences on pain and pain management  - Notify physician/advanced practitioner if interventions unsuccessful or patient reports new pain  Outcome: Progressing     Problem: INFECTION - ADULT  Goal: Absence or prevention of progression during hospitalization  Description: INTERVENTIONS:  - Assess and monitor for signs and symptoms of infection  - Monitor lab/diagnostic results  - Monitor all insertion sites, i.e. indwelling lines, tubes, and drains  - Monitor endotracheal if appropriate and nasal secretions for changes in amount and color  - [place] appropriate cooling/warming therapies per order  - Administer medications as ordered  - Instruct and encourage patient and family to use good hand hygiene technique  - Identify and instruct in appropriate isolation precautions for identified infection/condition  Outcome: Progressing  Goal: Absence of fever/infection during neutropenic period  Description: INTERVENTIONS:  - Monitor WBC    Outcome: Progressing     Problem: SAFETY ADULT  Goal: Patient will remain free of falls  Description: INTERVENTIONS:  - Educate patient/family on patient safety including physical limitations  - Instruct patient to call for assistance with activity   - Consult OT/PT to assist with strengthening/mobility   - Keep Call bell within reach  - Keep bed low and locked with side rails adjusted as appropriate  - Keep care items and personal belongings within reach  - Initiate and maintain comfort rounds  - Make Fall Risk Sign visible to staff    Outcome: Progressing  Goal:  Maintain or return to baseline ADL function  Description: INTERVENTIONS:  -  Assess patient's ability to carry out ADLs; assess patient's baseline for ADL function and identify physical deficits which impact ability to perform ADLs (bathing, care of mouth/teeth, toileting, grooming, dressing, etc.)  - Assess/evaluate cause of self-care deficits   - Assess range of motion  - Assess patient's mobility; develop plan if impaired  - Assess patient's need for assistive devices and provide as appropriate  - Encourage maximum independence but intervene and supervise when necessary  - Involve family in performance of ADLs  - Assess for home care needs following discharge   - Consider OT consult to assist with ADL evaluation and planning for discharge  - Provide patient education as appropriate  Outcome: Progressing  Goal: Maintains/Returns to pre admission functional level  Description: INTERVENTIONS:  - Perform AM-PAC 6 Click Basic Mobility/ Daily Activity assessment daily.  - Set and communicate daily mobility goal to care team and patient/family/caregiver.   - Collaborate with rehabilitation services on mobility goals if consulted    - Out of bed for toileting  - Record patient progress and toleration of activity level   Outcome: Progressing     Problem: DISCHARGE PLANNING  Goal: Discharge to home or other facility with appropriate resources  Description: INTERVENTIONS:  - Identify barriers to discharge w/patient and caregiver  - Arrange for needed discharge resources and transportation as appropriate  - Identify discharge learning needs (meds, wound care, etc.)  - Arrange for interpretive services to assist at discharge as needed  - Refer to Case Management Department for coordinating discharge planning if the patient needs post-hospital services based on physician/advanced practitioner order or complex needs related to functional status, cognitive ability, or social support system  Outcome: Progressing     Problem:  Knowledge Deficit  Goal: Patient/family/caregiver demonstrates understanding of disease process, treatment plan, medications, and discharge instructions  Description: Complete learning assessment and assess knowledge base.  Interventions:  - Provide teaching at level of understanding  - Provide teaching via preferred learning methods  Outcome: Progressing     Problem: COPING  Goal: Pt/Family able to verbalize concerns and demonstrate effective coping strategies  Description: INTERVENTIONS:  - Assist patient/family to identify coping skills, available support systems and cultural and spiritual values  - Provide emotional support, including active listening and acknowledgement of concerns of patient and caregivers  - Reduce environmental stimuli, as able  - Provide patient education  - Assess for spiritual pain/suffering and initiate spiritual care, including notification of Pastoral Care or [facility] based community as needed  - Assess effectiveness of coping strategies  Outcome: Progressing  Goal: Will report anxiety at manageable levels  Description: INTERVENTIONS:  - Administer medication as ordered  - Teach and encourage coping skills  - Provide emotional support  - Assess patient/family for anxiety and ability to cope  Outcome: Progressing     Problem: DECISION MAKING  Goal: Pt/Family able to effectively weigh alternatives and participate in decision making related to treatment and care  Description: INTERVENTIONS:  - Identify decision maker  - Determine when there are differences among patient's view, family's view, and healthcare provider's view of patient condition and care goals  - Facilitate patient/family articulation of goals for care  - Help patient/family identify pros/cons of alternative solutions  - Provide information as requested by patient/family  - Respect patient/family rights related to privacy and sharing information   - Serve as a liaison between patient, family and health care team  - Initiate  consults as appropriate (Ethics Team, Palliative Care, Family Care Conference, etc.)  Outcome: Progressing     Problem: SPIRITUAL CARE  Goal: Pt/Family able to move forward in process of forgiving self, others and/or higher power  Description: INTERVENTIONS:  - Assist patient with any spiritual needs/requests such as communion, confession, anointing, etc  - Explore guilt and help patient/family identify possible spiritual/cultural beliefs and values  - Explore possibilities of making amends & reconciliation with self, others, and/or a greater power  - Guide patient/family in identifying painful feelings  - Help patient explore and identify spiritual beliefs, cultural understandings or values that may help or hinder letting go of issue  - Help patient explore feelings of anger, bitterness, resentment, anxiety   Help patient/family identify and examine the situation in which these feelings are experienced  - Help patient/family identify destructive displacement of feelings onto other individuals  - Refer patient to formal counseling and/or to [facility] community for further support as needed or per request  Outcome: Progressing  Goal: Patient feels balance and connection with others and/or higher power that empowers the self during times of loss, guilt and fear  Description: INTERVENTIONS:  - Create safety for patient through empathic presence and non-judgmental listening  - Encourage patient to explore his/her values, beliefs and/or spiritual images and practices  - Encourage use of breath work, imagery, meditation, relaxation, reiki to ease distress and provide healing  - Encourage use of cultural and spiritual celebrations and rituals  - Facilitate discussion that helps patient sort out spiritual concerns  - Help patient identify where meaning/hope/comfort & strength are in his/her life  - Refer patient to [facility] community for assistance, as appropriate  - Respond to patient/family need for  prayer/ritual/sacrament/ceremony  Outcome: Progressing     Problem: POSTPARTUM  Goal: Experiences normal postpartum course  Description: INTERVENTIONS:  - Monitor maternal vital signs  - Assess uterine involution and lochia  Outcome: Progressing  Goal: Appropriate maternal -  bonding  Description: INTERVENTIONS:  - Identify family support  - Assess for appropriate maternal/infant bonding   -Encourage maternal/infant bonding opportunities  - Referral to  or  as needed  Outcome: Progressing  Goal: Establishment of infant feeding pattern  Description: INTERVENTIONS:  - Assess breast/bottle feeding  - Refer to lactation as needed  Outcome: Progressing  Goal: Incision(s), wounds(s) or drain site(s) healing without S/S of infection  Description: INTERVENTIONS  - Assess and document dressing, incision, wound bed, drain sites and surrounding tissue  - Provide patient and family education  - Perform skin care/dressing changes every day  Outcome: Progressing

## 2024-05-27 NOTE — PLAN OF CARE
Problem: PAIN - ADULT  Goal: Verbalizes/displays adequate comfort level or baseline comfort level  Description: Interventions:  - Encourage patient to monitor pain and request assistance  - Assess pain using appropriate pain scale  - Administer analgesics based on type and severity of pain and evaluate response  - Implement non-pharmacological measures as appropriate and evaluate response  - Consider cultural and social influences on pain and pain management  - Notify physician/advanced practitioner if interventions unsuccessful or patient reports new pain  5/26/2024 2325 by Kathryn Baugh RN  Outcome: Progressing  5/26/2024 2325 by Kathryn Baugh RN  Outcome: Progressing     Problem: INFECTION - ADULT  Goal: Absence or prevention of progression during hospitalization  Description: INTERVENTIONS:  - Assess and monitor for signs and symptoms of infection  - Monitor lab/diagnostic results  - Monitor all insertion sites, i.e. indwelling lines, tubes, and drains  - Monitor endotracheal if appropriate and nasal secretions for changes in amount and color  - [place] appropriate cooling/warming therapies per order  - Administer medications as ordered  - Instruct and encourage patient and family to use good hand hygiene technique  - Identify and instruct in appropriate isolation precautions for identified infection/condition  5/26/2024 2325 by Kathyrn Baugh RN  Outcome: Progressing  5/26/2024 2325 by Kathryn Baugh RN  Outcome: Progressing  Goal: Absence of fever/infection during neutropenic period  Description: INTERVENTIONS:  - Monitor WBC    5/26/2024 2325 by Kathryn Baugh RN  Outcome: Progressing  5/26/2024 2325 by Kathryn Baugh RN  Outcome: Progressing     Problem: SAFETY ADULT  Goal: Patient will remain free of falls  Description: INTERVENTIONS:  - Educate patient/family on patient safety including physical limitations  - Instruct patient to call for assistance with activity   - Consult OT/PT  to assist with strengthening/mobility   - Keep Call bell within reach  - Keep bed low and locked with side rails adjusted as appropriate  - Keep care items and personal belongings within reach  - Initiate and maintain comfort rounds  - Make Fall Risk Sign visible to staff  - Offer Toileting every  Hours, in advance of need  - Initiate/Maintain alarm  - Obtain necessary fall risk management equipment:   - Apply yellow socks and bracelet for high fall risk patients  - Consider moving patient to room near nurses station  5/26/2024 2325 by Kathryn Baugh RN  Outcome: Progressing  5/26/2024 2325 by Kathryn Baugh RN  Outcome: Progressing  Goal: Maintain or return to baseline ADL function  Description: INTERVENTIONS:  -  Assess patient's ability to carry out ADLs; assess patient's baseline for ADL function and identify physical deficits which impact ability to perform ADLs (bathing, care of mouth/teeth, toileting, grooming, dressing, etc.)  - Assess/evaluate cause of self-care deficits   - Assess range of motion  - Assess patient's mobility; develop plan if impaired  - Assess patient's need for assistive devices and provide as appropriate  - Encourage maximum independence but intervene and supervise when necessary  - Involve family in performance of ADLs  - Assess for home care needs following discharge   - Consider OT consult to assist with ADL evaluation and planning for discharge  - Provide patient education as appropriate  5/26/2024 2325 by Kathryn Baugh RN  Outcome: Progressing  5/26/2024 2325 by Kathryn Baugh RN  Outcome: Progressing  Goal: Maintains/Returns to pre admission functional level  Description: INTERVENTIONS:  - Perform AM-PAC 6 Click Basic Mobility/ Daily Activity assessment daily.  - Set and communicate daily mobility goal to care team and patient/family/caregiver.   - Collaborate with rehabilitation services on mobility goals if consulted  - Perform Range of Motion  times a day.  -  Reposition patient every  hours.  - Dangle patient  times a day  - Stand patient  times a day  - Ambulate patient  times a day  - Out of bed to chair  times a day   - Out of bed for meals  times a day  - Out of bed for toileting  - Record patient progress and toleration of activity level   5/26/2024 2325 by Kathryn Baugh RN  Outcome: Progressing  5/26/2024 2325 by Kathryn Baugh RN  Outcome: Progressing     Problem: DISCHARGE PLANNING  Goal: Discharge to home or other facility with appropriate resources  Description: INTERVENTIONS:  - Identify barriers to discharge w/patient and caregiver  - Arrange for needed discharge resources and transportation as appropriate  - Identify discharge learning needs (meds, wound care, etc.)  - Arrange for interpretive services to assist at discharge as needed  - Refer to Case Management Department for coordinating discharge planning if the patient needs post-hospital services based on physician/advanced practitioner order or complex needs related to functional status, cognitive ability, or social support system  5/26/2024 2325 by Kathryn aBugh RN  Outcome: Progressing  5/26/2024 2325 by Kathryn Baugh RN  Outcome: Progressing     Problem: Knowledge Deficit  Goal: Patient/family/caregiver demonstrates understanding of disease process, treatment plan, medications, and discharge instructions  Description: Complete learning assessment and assess knowledge base.  Interventions:  - Provide teaching at level of understanding  - Provide teaching via preferred learning methods  5/26/2024 2325 by Kathryn Baugh RN  Outcome: Progressing  5/26/2024 2325 by Kathryn Baugh RN  Outcome: Progressing     Problem: COPING  Goal: Pt/Family able to verbalize concerns and demonstrate effective coping strategies  Description: INTERVENTIONS:  - Assist patient/family to identify coping skills, available support systems and cultural and spiritual values  - Provide emotional support,  including active listening and acknowledgement of concerns of patient and caregivers  - Reduce environmental stimuli, as able  - Provide patient education  - Assess for spiritual pain/suffering and initiate spiritual care, including notification of Pastoral Care or [facility] based community as needed  - Assess effectiveness of coping strategies  5/26/2024 2325 by Kathryn Baugh RN  Outcome: Progressing  5/26/2024 2325 by Kathryn Baugh RN  Outcome: Progressing  Goal: Will report anxiety at manageable levels  Description: INTERVENTIONS:  - Administer medication as ordered  - Teach and encourage coping skills  - Provide emotional support  - Assess patient/family for anxiety and ability to cope  5/26/2024 2325 by Kathryn Baugh RN  Outcome: Progressing  5/26/2024 2325 by Kathryn Baugh RN  Outcome: Progressing     Problem: DECISION MAKING  Goal: Pt/Family able to effectively weigh alternatives and participate in decision making related to treatment and care  Description: INTERVENTIONS:  - Identify decision maker  - Determine when there are differences among patient's view, family's view, and healthcare provider's view of patient condition and care goals  - Facilitate patient/family articulation of goals for care  - Help patient/family identify pros/cons of alternative solutions  - Provide information as requested by patient/family  - Respect patient/family rights related to privacy and sharing information   - Serve as a liaison between patient, family and health care team  - Initiate consults as appropriate (Ethics Team, Palliative Care, Family Care Conference, etc.)  5/26/2024 2325 by Kathryn Baugh RN  Outcome: Progressing  5/26/2024 2325 by Kathryn Baugh RN  Outcome: Progressing     Problem: SPIRITUAL CARE  Goal: Pt/Family able to move forward in process of forgiving self, others and/or higher power  Description: INTERVENTIONS:  - Assist patient with any spiritual needs/requests such as communion,  confession, anointing, etc  - Explore guilt and help patient/family identify possible spiritual/cultural beliefs and values  - Explore possibilities of making amends & reconciliation with self, others, and/or a greater power  - Guide patient/family in identifying painful feelings  - Help patient explore and identify spiritual beliefs, cultural understandings or values that may help or hinder letting go of issue  - Help patient explore feelings of anger, bitterness, resentment, anxiety   Help patient/family identify and examine the situation in which these feelings are experienced  - Help patient/family identify destructive displacement of feelings onto other individuals  - Refer patient to formal counseling and/or to [facility] for further support as needed or per request  5/26/2024 2325 by Kathryn Baugh RN  Outcome: Progressing  5/26/2024 2325 by Kahtryn Baugh RN  Outcome: Progressing  Goal: Patient feels balance and connection with others and/or higher power that empowers the self during times of loss, guilt and fear  Description: INTERVENTIONS:  - Create safety for patient through empathic presence and non-judgmental listening  - Encourage patient to explore his/her values, beliefs and/or spiritual images and practices  - Encourage use of breath work, imagery, meditation, relaxation, reiki to ease distress and provide healing  - Encourage use of cultural and spiritual celebrations and rituals  - Facilitate discussion that helps patient sort out spiritual concerns  - Help patient identify where meaning/hope/comfort & strength are in his/her life  - Refer patient to [facility] for assistance, as appropriate  - Respond to patient/family need for prayer/ritual/sacrament/ceremony  5/26/2024 2325 by Kathryn Baugh RN  Outcome: Progressing  5/26/2024 2325 by Kathryn Baugh RN  Outcome: Progressing     Problem: POSTPARTUM  Goal: Experiences normal postpartum course  Description: INTERVENTIONS:  -  Monitor maternal vital signs  - Assess uterine involution and lochia  2024 by Kathryn Baugh RN  Outcome: Progressing  2024 by Kathryn Baugh RN  Outcome: Progressing  Goal: Appropriate maternal -  bonding  Description: INTERVENTIONS:  - Identify family support  - Assess for appropriate maternal/infant bonding   -Encourage maternal/infant bonding opportunities  - Referral to  or  as needed  2024 by Kathryn Baugh RN  Outcome: Progressing  2024 by Kathryn Baugh RN  Outcome: Progressing  Goal: Establishment of infant feeding pattern  Description: INTERVENTIONS:  - Assess breast/bottle feeding  - Refer to lactation as needed  2024 by Kathryn Baugh RN  Outcome: Progressing  2024 by Kathryn Baugh RN  Outcome: Progressing  Goal: Incision(s), wounds(s) or drain site(s) healing without S/S of infection  Description: INTERVENTIONS  - Assess and document dressing, incision, wound bed, drain sites and surrounding tissue  - Provide patient and family education  - Perform skin care/dressing changes every   2024 by Kathryn Baugh RN  Outcome: Progressing  2024 by Kathryn Baugh RN  Outcome: Progressing

## 2024-05-27 NOTE — PROGRESS NOTES
Progress Note - OB/GYN   Antony Rodriguez 28 y.o. female MRN: [id number]  Unit/Bed#: -01 Encounter: [id number]      Assessment/Plan    Antony Rodriguez is a 28 y.o.  who is PPD 1 s/p 1LTCS at 32w5d for vaginal bleeding in setting of placenta previa. Baby in NICU due to prematurity    Elevated BP without diagnosis of hypertension  Assessment & Plan  Isolated elevated BP prior to delivery  Continue to monitor blood pressures    Rh negative state in antepartum period, third trimester  Assessment & Plan  Received Rhogam at 28w  Recommend additional dose of rhogam on admission     32 weeks gestation of pregnancy  Assessment & Plan  Delivered at 32w due to vaginal bleeding insetting of placenta previa    Crohn's disease of colon without complication ([facility])  Assessment & Plan  Weekly home Humira for crohn's disease    CVID (common variable immunodeficiency) ([facility])  Assessment & Plan  Weekly hizentra, dose adjusted to 11g per Dr. Durand note on  for postpartum    Iron deficiency anemia  Assessment & Plan  Receives twice weekly venofer infusions  Continue while inpatient.     Hypothyroidism due to Hashimoto's thyroiditis  Assessment & Plan  Continue home levothyroxine    Asthma, moderate persistent  Assessment & Plan  Takes Budesonide inhaler  Avoid hemabate if patient were to hemorrhage    * Status post primary low transverse  section  Assessment & Plan  Routine postpartum care  Encourage ambulation  Encourage familial bonding  Lactation support as needed  Pain: Motrin/Tylenol around the clock, oxycodone if needed  Pre-delivery Hgb 11.3 --> 9.8  Voiding spontaneously, s/p arreola  DVT Ppx: ambulation, SCDs, Lovenox 40mg BID           Disposition: Anticipate discharge home postpartum Day #3-4  Barriers to discharge: couplet care      Subjective/Objective     Subjective: Postpartum state    Pain: no  Tolerating PO: yes  Voiding: yes  Flatus: yes  BM: no  Ambulating: yes  Breastfeeding: Using breast  pump  Chest pain: no  Shortness of breath: no  Leg pain: no  Lochia: minimal    Objective:     Vitals:  Vitals:    05/26/24 2349 05/27/24 0354 05/27/24 0420 05/27/24 0443   BP: 116/70 152/90 165/97 140/83   BP Location: Right arm Right arm     Pulse: 77 82     Resp: 20 19     Temp: 98.1 °F (36.7 °C) 98.1 °F (36.7 °C)     TempSrc: Oral Oral     SpO2: 96% 95%     Weight:       Height:           Physical Exam:   GEN: appears well, alert and oriented x 3, pleasant and cooperative   CV: Regular rate  RESP: non labored breathing  ABDOMEN: soft, no tenderness, no distention, Uterine fundus firm and non-tender, -1 cm below the umbilicus, incision c/d/i  EXTREMITIES: non-tender  NEURO Alert and oriented to person, place, and time.       Lab Results   Component Value Date    WBC 17.09 (H) 05/26/2024    HGB 9.8 (L) 05/26/2024    HCT 30.7 (L) 05/26/2024    MCV 90 05/26/2024     05/26/2024         Leslie Puri DO  Obstetrics & Gynecology  05/27/24

## 2024-05-28 PROBLEM — O13.9 GESTATIONAL HYPERTENSION: Status: ACTIVE | Noted: 2024-05-26

## 2024-05-28 LAB
ABO GROUP BLD BPU: NORMAL
ABO GROUP BLD BPU: NORMAL
BPU ID: NORMAL
BPU ID: NORMAL
CROSSMATCH: NORMAL
CROSSMATCH: NORMAL
UNIT DISPENSE STATUS: NORMAL
UNIT DISPENSE STATUS: NORMAL
UNIT PRODUCT CODE: NORMAL
UNIT PRODUCT CODE: NORMAL
UNIT PRODUCT VOLUME: 350 ML
UNIT PRODUCT VOLUME: 350 ML
UNIT RH: NORMAL
UNIT RH: NORMAL

## 2024-05-28 PROCEDURE — 99024 POSTOP FOLLOW-UP VISIT: CPT | Performed by: OBSTETRICS & GYNECOLOGY

## 2024-05-28 RX ADMIN — OXYCODONE HYDROCHLORIDE 10 MG: 10 TABLET ORAL at 22:03

## 2024-05-28 RX ADMIN — ACETAMINOPHEN 650 MG: 325 TABLET, FILM COATED ORAL at 23:45

## 2024-05-28 RX ADMIN — ACETAMINOPHEN 650 MG: 325 TABLET, FILM COATED ORAL at 18:27

## 2024-05-28 RX ADMIN — IBUPROFEN 600 MG: 600 TABLET, FILM COATED ORAL at 23:45

## 2024-05-28 RX ADMIN — ENOXAPARIN SODIUM 40 MG: 40 INJECTION SUBCUTANEOUS at 22:03

## 2024-05-28 RX ADMIN — Medication 1 TABLET: at 10:01

## 2024-05-28 RX ADMIN — ACETAMINOPHEN 650 MG: 325 TABLET, FILM COATED ORAL at 12:09

## 2024-05-28 RX ADMIN — PANTOPRAZOLE SODIUM 40 MG: 40 TABLET, DELAYED RELEASE ORAL at 06:21

## 2024-05-28 RX ADMIN — DOCUSATE SODIUM 100 MG: 100 CAPSULE, LIQUID FILLED ORAL at 10:01

## 2024-05-28 RX ADMIN — IBUPROFEN 600 MG: 600 TABLET, FILM COATED ORAL at 18:27

## 2024-05-28 RX ADMIN — LEVOTHYROXINE SODIUM 100 MCG: 100 TABLET ORAL at 06:22

## 2024-05-28 RX ADMIN — ACETAMINOPHEN 650 MG: 325 TABLET, FILM COATED ORAL at 06:22

## 2024-05-28 RX ADMIN — IBUPROFEN 600 MG: 600 TABLET, FILM COATED ORAL at 06:22

## 2024-05-28 RX ADMIN — ENOXAPARIN SODIUM 40 MG: 40 INJECTION SUBCUTANEOUS at 10:02

## 2024-05-28 RX ADMIN — IBUPROFEN 600 MG: 600 TABLET, FILM COATED ORAL at 12:09

## 2024-05-28 NOTE — PROGRESS NOTES
Progress Note - OB/GYN   Antony Rodriguez 28 y.o. female MRN: [id number]  Unit/Bed#: -01 Encounter: [id number]      Assessment/Plan    Antony Rodriguez is a 28 y.o.  who is PPD 2 s/p LTCS at 32w5d     Gestational hypertension  Assessment & Plan  Meeting criteria postpartum  CBC, CMP wnl  Continue to monitor blood pressures    Systolic (24hrs), Av , Min:115 , Max:140   Diastolic (24hrs), Av, Min:75, Max:87        Rh negative state in antepartum period, third trimester  Assessment & Plan  Received Rhogam at 28w  Recommend additional dose of rhogam on admission     32 weeks gestation of pregnancy  Assessment & Plan  Delivered at 32w due to vaginal bleeding insetting of placenta previa    Crohn's disease of colon without complication (HCC)  Assessment & Plan  Weekly home Humira for crohn's disease    CVID (common variable immunodeficiency) (HCC)  Assessment & Plan  Weekly hizentra, dose adjusted to 11g per Dr. Durand note on  for postpartum    Iron deficiency anemia  Assessment & Plan  Receives twice weekly venofer infusions  Continue while inpatient.     Hypothyroidism due to Hashimoto's thyroiditis  Assessment & Plan  Continue home levothyroxine    Asthma, moderate persistent  Assessment & Plan  Takes Budesonide inhaler  Avoid hemabate if patient were to hemorrhage    * Status post primary low transverse  section  Assessment & Plan  Routine postpartum care  Encourage ambulation  Encourage familial bonding  Lactation support as needed  Pain: Motrin/Tylenol around the clock, oxycodone if needed  Pre-delivery Hgb 11.3 --> 9.8  Voiding spontaneously, s/p arreola  DVT Ppx: ambulation, SCDs, Lovenox 40mg BID           Disposition: Anticipate discharge home postpartum Day #3  Barriers to discharge: ongoing couplet care      Subjective/Objective     Subjective: Postpartum state    Pain: no  Tolerating PO: yes  Voiding: yes  Flatus: yes  BM: yes  Ambulating: yes  Breastfeeding: Bottle feeding and  Using breast pump  Chest pain: no  Shortness of breath: no  Leg pain: no  Lochia: minimal    Objective:     Vitals:  Vitals:    05/27/24 1234 05/27/24 2030 05/27/24 2352 05/28/24 0355   BP: 138/83 131/87 140/75 133/85   BP Location:  Right arm Right arm Right arm   Pulse: 82 86 77 69   Resp: 20 18 18 18   Temp: 98.6 °F (37 °C) 98 °F (36.7 °C) 98.1 °F (36.7 °C) 98.3 °F (36.8 °C)   TempSrc: Oral Oral Oral Oral   SpO2:  98% 98% 97%   Weight:       Height:           Physical Exam:   GEN: appears well, alert and oriented x 3, pleasant and cooperative   CV: Regular rate and rhythm  RESP: non labored breathing, lungs clear to auscultation  ABDOMEN: soft, no tenderness, no distention, Uterine fundus firm and non-tender, -1 cm below the umbilicus, incision c/d/i  EXTREMITIES: non-tender  NEURO Alert and oriented to person, place, and time.       Lab Results   Component Value Date    WBC 17.09 (H) 05/26/2024    HGB 9.8 (L) 05/26/2024    HCT 30.7 (L) 05/26/2024    MCV 90 05/26/2024     05/26/2024         Nina Kathleen MD  Obstetrics & Gynecology  05/28/24

## 2024-05-28 NOTE — LACTATION NOTE
05/28/24 0900   Lactation Consultation   Reason for Consult 20 m;5 min;10 minute   Lactation Consultant Total Time 35   Risk Factors NICU infant   Maternal Information   Has mother  before? No   Breasts/Nipples   Date Pumping Initiated 05/26/24   Left Breast Soft   Right Breast Soft   Left Nipple Everted   Right Nipple Everted   Intervention Breast pump;Hand expression   Breastfeeding Status Yes   Breastfeeding Progress Pumping only   Reasons for not Breastfeeding Infant medical condition   Other OB Lactation Tools   Feeding Devices Pump;Other (Comment);Lanolin  (oral swab)   Breast Pump   Pump 2   Patient Follow-Up   Lactation Consult Status 2   Follow-Up Type Inpatient;Call as needed   Other OB Lactation Documentation    Additional Problem Noted Mom pumping for baby in NICU. drops collected. Demonstration of how to cycle through a pumping session. Mom was having pain on right nipple with pump, decreased suction and pain subsided. Educ on different flanges. Encouraged hands on pumping and hand expression. Demonstration with teach back of hand expression, effective for drops.  Enc mom to call for further assistance     Information on hand expression given. Discussed benefits of knowing how to manually express breast including stimulating milk supply, softening nipple for latch and evacuating breast in the event of engorgement.    Pumping:   - When pumping, begin in stimulation mode (high cycle, low vacuum) until milk begins to express. Change pump to expression mode (low cycle, high vacuum). Use hands on pumping techniques to assist with milk transfer. When milk stops expressing, change back to stimulation mode. When milk begins to flow, change to expression mode. You may cycle pump up to three times in a pumping session.  Instructions given on pumping.  Discussed when to start, frequency, different pumps available versus manual expression.  Encouraged parents to call for assistance, questions, and  concerns about breastfeeding.  Extension provided.

## 2024-05-28 NOTE — PLAN OF CARE
Problem: PAIN - ADULT  Goal: Verbalizes/displays adequate comfort level or baseline comfort level  Description: Interventions:  - Encourage patient to monitor pain and request assistance  - Assess pain using appropriate pain scale  - Administer analgesics based on type and severity of pain and evaluate response  - Implement non-pharmacological measures as appropriate and evaluate response  - Consider cultural and social influences on pain and pain management  - Notify physician/advanced practitioner if interventions unsuccessful or patient reports new pain  Outcome: Progressing     Problem: INFECTION - ADULT  Goal: Absence or prevention of progression during hospitalization  Description: INTERVENTIONS:  - Assess and monitor for signs and symptoms of infection  - Monitor lab/diagnostic results  - Monitor all insertion sites, i.e. indwelling lines, tubes, and drains  - Monitor endotracheal if appropriate and nasal secretions for changes in amount and color  - [place] appropriate cooling/warming therapies per order  - Administer medications as ordered  - Instruct and encourage patient and family to use good hand hygiene technique  - Identify and instruct in appropriate isolation precautions for identified infection/condition  Outcome: Progressing  Goal: Absence of fever/infection during neutropenic period  Description: INTERVENTIONS:  - Monitor WBC    Outcome: Progressing     Problem: SAFETY ADULT  Goal: Patient will remain free of falls  Description: INTERVENTIONS:  - Educate patient/family on patient safety including physical limitations  - Instruct patient to call for assistance with activity   - Consult OT/PT to assist with strengthening/mobility   - Keep Call bell within reach  - Keep bed low and locked with side rails adjusted as appropriate  - Keep care items and personal belongings within reach  - Initiate and maintain comfort rounds  - Make Fall Risk Sign visible to staff  - Offer Toileting every 2-3  Hours, in advance of need  - Initiate/Maintain alarm  - Obtain necessary fall risk management equipment:   - Apply yellow socks and bracelet for high fall risk patients  - Consider moving patient to room near nurses station  Outcome: Progressing  Goal: Maintain or return to baseline ADL function  Description: INTERVENTIONS:  -  Assess patient's ability to carry out ADLs; assess patient's baseline for ADL function and identify physical deficits which impact ability to perform ADLs (bathing, care of mouth/teeth, toileting, grooming, dressing, etc.)  - Assess/evaluate cause of self-care deficits   - Assess range of motion  - Assess patient's mobility; develop plan if impaired  - Assess patient's need for assistive devices and provide as appropriate  - Encourage maximum independence but intervene and supervise when necessary  - Involve family in performance of ADLs  - Assess for home care needs following discharge   - Consider OT consult to assist with ADL evaluation and planning for discharge  - Provide patient education as appropriate  Outcome: Progressing  Goal: Maintains/Returns to pre admission functional level  Description: INTERVENTIONS:  - Perform AM-PAC 6 Click Basic Mobility/ Daily Activity assessment daily.  - Set and communicate daily mobility goal to care team and patient/family/caregiver.   - Collaborate with rehabilitation services on mobility goals if consulted  - Out of bed for toileting  - Record patient progress and toleration of activity level   Outcome: Progressing     Problem: DISCHARGE PLANNING  Goal: Discharge to home or other facility with appropriate resources  Description: INTERVENTIONS:  - Identify barriers to discharge w/patient and caregiver  - Arrange for needed discharge resources and transportation as appropriate  - Identify discharge learning needs (meds, wound care, etc.)  - Arrange for interpretive services to assist at discharge as needed  - Refer to Case Management Department for  coordinating discharge planning if the patient needs post-hospital services based on physician/advanced practitioner order or complex needs related to functional status, cognitive ability, or social support system  Outcome: Progressing     Problem: Knowledge Deficit  Goal: Patient/family/caregiver demonstrates understanding of disease process, treatment plan, medications, and discharge instructions  Description: Complete learning assessment and assess knowledge base.  Interventions:  - Provide teaching at level of understanding  - Provide teaching via preferred learning methods  Outcome: Progressing     Problem: COPING  Goal: Pt/Family able to verbalize concerns and demonstrate effective coping strategies  Description: INTERVENTIONS:  - Assist patient/family to identify coping skills, available support systems and cultural and spiritual values  - Provide emotional support, including active listening and acknowledgement of concerns of patient and caregivers  - Reduce environmental stimuli, as able  - Provide patient education  - Assess for spiritual pain/suffering and initiate spiritual care, including notification of Pastoral Care or jonas based community as needed  - Assess effectiveness of coping strategies  Outcome: Progressing  Goal: Will report anxiety at manageable levels  Description: INTERVENTIONS:  - Administer medication as ordered  - Teach and encourage coping skills  - Provide emotional support  - Assess patient/family for anxiety and ability to cope  Outcome: Progressing     Problem: DECISION MAKING  Goal: Pt/Family able to effectively weigh alternatives and participate in decision making related to treatment and care  Description: INTERVENTIONS:  - Identify decision maker  - Determine when there are differences among patient's view, family's view, and healthcare provider's view of patient condition and care goals  - Facilitate patient/family articulation of goals for care  - Help patient/family  identify pros/cons of alternative solutions  - Provide information as requested by patient/family  - Respect patient/family rights related to privacy and sharing information   - Serve as a liaison between patient, family and health care team  - Initiate consults as appropriate (Ethics Team, Palliative Care, Family Care Conference, etc.)  Outcome: Progressing     Problem: SPIRITUAL CARE  Goal: Pt/Family able to move forward in process of forgiving self, others and/or higher power  Description: INTERVENTIONS:  - Assist patient with any spiritual needs/requests such as communion, confession, anointing, etc  - Explore guilt and help patient/family identify possible spiritual/cultural beliefs and values  - Explore possibilities of making amends & reconciliation with self, others, and/or a greater power  - Guide patient/family in identifying painful feelings  - Help patient explore and identify spiritual beliefs, cultural understandings or values that may help or hinder letting go of issue  - Help patient explore feelings of anger, bitterness, resentment, anxiety   Help patient/family identify and examine the situation in which these feelings are experienced  - Help patient/family identify destructive displacement of feelings onto other individuals  - Refer patient to formal counseling and/or to jonas community for further support as needed or per request  Outcome: Progressing  Goal: Patient feels balance and connection with others and/or higher power that empowers the self during times of loss, guilt and fear  Description: INTERVENTIONS:  - Create safety for patient through empathic presence and non-judgmental listening  - Encourage patient to explore his/her values, beliefs and/or spiritual images and practices  - Encourage use of breath work, imagery, meditation, relaxation, reiki to ease distress and provide healing  - Encourage use of cultural and spiritual celebrations and rituals  - Facilitate discussion that helps  patient sort out spiritual concerns  - Help patient identify where meaning/hope/comfort & strength are in his/her life  - Refer patient to jonas community for assistance, as appropriate  - Respond to patient/family need for prayer/ritual/sacrament/ceremony  Outcome: Progressing     Problem: POSTPARTUM  Goal: Experiences normal postpartum course  Description: INTERVENTIONS:  - Monitor maternal vital signs  - Assess uterine involution and lochia  Outcome: Progressing  Goal: Appropriate maternal -  bonding  Description: INTERVENTIONS:  - Identify family support  - Assess for appropriate maternal/infant bonding   -Encourage maternal/infant bonding opportunities  - Referral to  or  as needed  Outcome: Progressing  Goal: Establishment of infant feeding pattern  Description: INTERVENTIONS:  - Assess breast/bottle feeding  - Refer to lactation as needed  Outcome: Progressing  Goal: Incision(s), wounds(s) or drain site(s) healing without S/S of infection  Description: INTERVENTIONS  - Assess and document dressing, incision, wound bed, drain sites and surrounding tissue  - Provide patient and family education  - Perform skin care/dressing changes   Outcome: Progressing

## 2024-05-28 NOTE — CASE MANAGEMENT
Case Management Progress Note    Patient name Antony Rodriguez  Location /-01 MRN [id number]  : 1995 Date 2024       LOS (days): 2  Geometric Mean LOS (GMLOS) (days):   Days to GMLOS:        OBJECTIVE:        Current admission status: Inpatient  Preferred Pharmacy:   [facility] [place] [place] [facility] [address] [facility] [address] [facility]  [place] [address]  Phone: [phone] Fax: [phone]    [facility] - [address]  [facility],  [address]  [facility] [address] [facility]  [address] [place]  [place] [address]  Phone: [phone] Fax: [phone]    [facility] [employer] - [address] [address]  Phone: [phone] Fax: [phone]    Primary Care Provider: Melony Baez DO    Primary Insurance: CAPITAL  Secondary Insurance:     PROGRESS NOTE:      CM met with MOB to introduce CM services, complete assessment, and provide CM contact info.    MOB had  present and verbalized agreement with personal interview with them present.    MOB reported the following:    Assessment:  Consult reason: NICU Admission  Gestational Age at Birth: 32 Weeks + 5 Days  MOB Name (& age if teen):   Antony Rodriguez  FOB Name (& age if teen MOB): Brian Rodriguez    Other Legal Guardian(s) for Baby:    n/a  Other Children:   First Baby  Housing Plan/Lives with: FOB     Insurance Coverage/Plan for Baby: MOB verbalizes that they will contact their insurance to add baby ASAP.   Support System: Family, Friends, and Spouse/Significant Other  Care Items: Crib/Bassinet (Safe Sleep Space)- having baby shower on   Method of Feeding: Breast Feeding  Breast Pump: Breast pump obtained prior to admission  Government Assistance Programs: None   Arrangements: MOB and Family  Current Employment/Schooling: MOB employed Full Time  Mental Health History and/or Treatment:   None reported   Substance Use  History and/or Treatment:   None reported  Urine Drug Screen Results: Not Applicable  Children & Youth History: None  Current Legal Issues: N/A  Domestic/Intimate Partner Violence History: Denies.  NICU Resources: NICU Packet Provided    Discharge Plan:  Pediatrician:   Mckay Finnegan  Prenatal/ Care:  [place]  Follow-Up Appointments Needed/Scheduled: TBD  Medications/DME/Other Referrals: TBD  Transportation Plan: MOB has a vehicle    Follow-Up Needed from Care Management:         CM provided NICU packet, no current needs.

## 2024-05-28 NOTE — PLAN OF CARE
Problem: PAIN - ADULT  Goal: Verbalizes/displays adequate comfort level or baseline comfort level  Description: Interventions:  - Encourage patient to monitor pain and request assistance  - Assess pain using appropriate pain scale  - Administer analgesics based on type and severity of pain and evaluate response  - Implement non-pharmacological measures as appropriate and evaluate response  - Consider cultural and social influences on pain and pain management  - Notify physician/advanced practitioner if interventions unsuccessful or patient reports new pain  Outcome: Progressing     Problem: INFECTION - ADULT  Goal: Absence or prevention of progression during hospitalization  Description: INTERVENTIONS:  - Assess and monitor for signs and symptoms of infection  - Monitor lab/diagnostic results  - Monitor all insertion sites, i.e. indwelling lines, tubes, and drains  - Monitor endotracheal if appropriate and nasal secretions for changes in amount and color  - [place] appropriate cooling/warming therapies per order  - Administer medications as ordered  - Instruct and encourage patient and family to use good hand hygiene technique  - Identify and instruct in appropriate isolation precautions for identified infection/condition  Outcome: Progressing  Goal: Absence of fever/infection during neutropenic period  Description: INTERVENTIONS:  - Monitor WBC    Outcome: Progressing     Problem: SAFETY ADULT  Goal: Patient will remain free of falls  Description: INTERVENTIONS:  - Educate patient/family on patient safety including physical limitations  - Instruct patient to call for assistance with activity   - Consult OT/PT to assist with strengthening/mobility   - Keep Call bell within reach  - Keep bed low and locked with side rails adjusted as appropriate  - Keep care items and personal belongings within reach  - Initiate and maintain comfort rounds  - Make Fall Risk Sign visible to staff  - Offer Toileting every  Hours,  in advance of need  - Initiate/Maintain alarm  - Obtain necessary fall risk management equipment:  - Apply yellow socks and bracelet for high fall risk patients  - Consider moving patient to room near nurses station  Outcome: Progressing  Goal: Maintain or return to baseline ADL function  Description: INTERVENTIONS:  -  Assess patient's ability to carry out ADLs; assess patient's baseline for ADL function and identify physical deficits which impact ability to perform ADLs (bathing, care of mouth/teeth, toileting, grooming, dressing, etc.)  - Assess/evaluate cause of self-care deficits   - Assess range of motion  - Assess patient's mobility; develop plan if impaired  - Assess patient's need for assistive devices and provide as appropriate  - Encourage maximum independence but intervene and supervise when necessary  - Involve family in performance of ADLs  - Assess for home care needs following discharge   - Consider OT consult to assist with ADL evaluation and planning for discharge  - Provide patient education as appropriate  Outcome: Progressing  Goal: Maintains/Returns to pre admission functional level  Description: INTERVENTIONS:  - Perform AM-PAC 6 Click Basic Mobility/ Daily Activity assessment daily.  - Set and communicate daily mobility goal to care team and patient/family/caregiver.   - Collaborate with rehabilitation services on mobility goals if consulted  - Perform Range of Motion  times a day.  - Reposition patient every  hours.  - Dangle patient times a day  - Stand patient  times a day  - Ambulate patient  times a day  - Out of bed to chair times a day   - Out of bed for meals  times a day  - Out of bed for toileting  - Record patient progress and toleration of activity level   Outcome: Progressing     Problem: DISCHARGE PLANNING  Goal: Discharge to home or other facility with appropriate resources  Description: INTERVENTIONS:  - Identify barriers to discharge w/patient and caregiver  - Arrange for  needed discharge resources and transportation as appropriate  - Identify discharge learning needs (meds, wound care, etc.)  - Arrange for interpretive services to assist at discharge as needed  - Refer to Case Management Department for coordinating discharge planning if the patient needs post-hospital services based on physician/advanced practitioner order or complex needs related to functional status, cognitive ability, or social support system  Outcome: Progressing     Problem: Knowledge Deficit  Goal: Patient/family/caregiver demonstrates understanding of disease process, treatment plan, medications, and discharge instructions  Description: Complete learning assessment and assess knowledge base.  Interventions:  - Provide teaching at level of understanding  - Provide teaching via preferred learning methods  Outcome: Progressing     Problem: COPING  Goal: Pt/Family able to verbalize concerns and demonstrate effective coping strategies  Description: INTERVENTIONS:  - Assist patient/family to identify coping skills, available support systems and cultural and spiritual values  - Provide emotional support, including active listening and acknowledgement of concerns of patient and caregivers  - Reduce environmental stimuli, as able  - Provide patient education  - Assess for spiritual pain/suffering and initiate spiritual care, including notification of Pastoral Care or [facility] based community as needed  - Assess effectiveness of coping strategies  Outcome: Progressing  Goal: Will report anxiety at manageable levels  Description: INTERVENTIONS:  - Administer medication as ordered  - Teach and encourage coping skills  - Provide emotional support  - Assess patient/family for anxiety and ability to cope  Outcome: Progressing     Problem: DECISION MAKING  Goal: Pt/Family able to effectively weigh alternatives and participate in decision making related to treatment and care  Description: INTERVENTIONS:  - Identify decision  maker  - Determine when there are differences among patient's view, family's view, and healthcare provider's view of patient condition and care goals  - Facilitate patient/family articulation of goals for care  - Help patient/family identify pros/cons of alternative solutions  - Provide information as requested by patient/family  - Respect patient/family rights related to privacy and sharing information   - Serve as a liaison between patient, family and health care team  - Initiate consults as appropriate (Ethics Team, Palliative Care, Family Care Conference, etc.)  Outcome: Progressing     Problem: SPIRITUAL CARE  Goal: Pt/Family able to move forward in process of forgiving self, others and/or higher power  Description: INTERVENTIONS:  - Assist patient with any spiritual needs/requests such as communion, confession, anointing, etc  - Explore guilt and help patient/family identify possible spiritual/cultural beliefs and values  - Explore possibilities of making amends & reconciliation with self, others, and/or a greater power  - Guide patient/family in identifying painful feelings  - Help patient explore and identify spiritual beliefs, cultural understandings or values that may help or hinder letting go of issue  - Help patient explore feelings of anger, bitterness, resentment, anxiety   Help patient/family identify and examine the situation in which these feelings are experienced  - Help patient/family identify destructive displacement of feelings onto other individuals  - Refer patient to formal counseling and/or to [facility] community for further support as needed or per request  Outcome: Progressing  Goal: Patient feels balance and connection with others and/or higher power that empowers the self during times of loss, guilt and fear  Description: INTERVENTIONS:  - Create safety for patient through empathic presence and non-judgmental listening  - Encourage patient to explore his/her values, beliefs and/or spiritual  images and practices  - Encourage use of breath work, imagery, meditation, relaxation, reiki to ease distress and provide healing  - Encourage use of cultural and spiritual celebrations and rituals  - Facilitate discussion that helps patient sort out spiritual concerns  - Help patient identify where meaning/hope/comfort & strength are in his/her life  - Refer patient to [facility] community for assistance, as appropriate  - Respond to patient/family need for prayer/ritual/sacrament/ceremony  Outcome: Progressing     Problem: POSTPARTUM  Goal: Experiences normal postpartum course  Description: INTERVENTIONS:  - Monitor maternal vital signs  - Assess uterine involution and lochia  Outcome: Progressing  Goal: Appropriate maternal -  bonding  Description: INTERVENTIONS:  - Identify family support  - Assess for appropriate maternal/infant bonding   -Encourage maternal/infant bonding opportunities  - Referral to  or  as needed  Outcome: Progressing  Goal: Establishment of infant feeding pattern  Description: INTERVENTIONS:  - Assess breast/bottle feeding  - Refer to lactation as needed  Outcome: Progressing  Goal: Incision(s), wounds(s) or drain site(s) healing without S/S of infection  Description: INTERVENTIONS  - Assess and document dressing, incision, wound bed, drain sites and surrounding tissue  - Provide patient and family education  - Perform skin care/dressing changes every   Outcome: Progressing

## 2024-05-29 VITALS
BODY MASS INDEX: 44.2 KG/M2 | RESPIRATION RATE: 16 BRPM | DIASTOLIC BLOOD PRESSURE: 78 MMHG | WEIGHT: 275 LBS | TEMPERATURE: 98.1 F | OXYGEN SATURATION: 96 % | HEIGHT: 66 IN | HEART RATE: 58 BPM | SYSTOLIC BLOOD PRESSURE: 141 MMHG

## 2024-05-29 PROCEDURE — 99024 POSTOP FOLLOW-UP VISIT: CPT | Performed by: OBSTETRICS & GYNECOLOGY

## 2024-05-29 RX ORDER — OXYCODONE HYDROCHLORIDE 5 MG/1
5 TABLET ORAL EVERY 6 HOURS PRN
Qty: 15 TABLET | Refills: 0 | Status: SHIPPED | OUTPATIENT
Start: 2024-05-29

## 2024-05-29 RX ORDER — IBUPROFEN 600 MG/1
600 TABLET ORAL EVERY 6 HOURS PRN
Qty: 60 TABLET | Refills: 0 | Status: SHIPPED | OUTPATIENT
Start: 2024-05-29

## 2024-05-29 RX ADMIN — IBUPROFEN 600 MG: 600 TABLET, FILM COATED ORAL at 14:22

## 2024-05-29 RX ADMIN — LEVOTHYROXINE SODIUM 100 MCG: 100 TABLET ORAL at 06:19

## 2024-05-29 RX ADMIN — ENOXAPARIN SODIUM 40 MG: 40 INJECTION SUBCUTANEOUS at 08:38

## 2024-05-29 RX ADMIN — IBUPROFEN 600 MG: 600 TABLET, FILM COATED ORAL at 06:19

## 2024-05-29 RX ADMIN — Medication 1 TABLET: at 08:38

## 2024-05-29 RX ADMIN — PANTOPRAZOLE SODIUM 40 MG: 40 TABLET, DELAYED RELEASE ORAL at 06:19

## 2024-05-29 NOTE — PLAN OF CARE
Problem: PAIN - ADULT  Goal: Verbalizes/displays adequate comfort level or baseline comfort level  Description: Interventions:  - Encourage patient to monitor pain and request assistance  - Assess pain using appropriate pain scale  - Administer analgesics based on type and severity of pain and evaluate response  - Implement non-pharmacological measures as appropriate and evaluate response  - Consider cultural and social influences on pain and pain management  - Notify physician/advanced practitioner if interventions unsuccessful or patient reports new pain  Outcome: Adequate for Discharge     Problem: INFECTION - ADULT  Goal: Absence or prevention of progression during hospitalization  Description: INTERVENTIONS:  - Assess and monitor for signs and symptoms of infection  - Monitor lab/diagnostic results  - Monitor all insertion sites, i.e. indwelling lines, tubes, and drains  - Monitor endotracheal if appropriate and nasal secretions for changes in amount and color  - [place] appropriate cooling/warming therapies per order  - Administer medications as ordered  - Instruct and encourage patient and family to use good hand hygiene technique  - Identify and instruct in appropriate isolation precautions for identified infection/condition  Outcome: Adequate for Discharge  Goal: Absence of fever/infection during neutropenic period  Description: INTERVENTIONS:  - Monitor WBC    Outcome: Adequate for Discharge     Problem: SAFETY ADULT  Goal: Patient will remain free of falls  Description: INTERVENTIONS:  - Educate patient/family on patient safety including physical limitations  - Instruct patient to call for assistance with activity   - Consult OT/PT to assist with strengthening/mobility   - Keep Call bell within reach  - Keep bed low and locked with side rails adjusted as appropriate  - Keep care items and personal belongings within reach  - Initiate and maintain comfort rounds    Outcome: Adequate for Discharge  Goal:  Maintain or return to baseline ADL function  Description: INTERVENTIONS:  -  Assess patient's ability to carry out ADLs; assess patient's baseline for ADL function and identify physical deficits which impact ability to perform ADLs (bathing, care of mouth/teeth, toileting, grooming, dressing, etc.)  - Assess/evaluate cause of self-care deficits   - Assess range of motion  - Assess patient's mobility; develop plan if impaired  - Assess patient's need for assistive devices and provide as appropriate  - Encourage maximum independence but intervene and supervise when necessary  - Involve family in performance of ADLs  - Assess for home care needs following discharge   - Consider OT consult to assist with ADL evaluation and planning for discharge  - Provide patient education as appropriate  Outcome: Adequate for Discharge  Goal: Maintains/Returns to pre admission functional level  Description: INTERVENTIONS:  - Perform AM-PAC 6 Click Basic Mobility/ Daily Activity assessment daily.  - Set and communicate daily mobility goal to care team and patient/family/caregiver.   - Collaborate with rehabilitation services on mobility goals if consulted    - Out of bed for toileting  - Record patient progress and toleration of activity level   Outcome: Adequate for Discharge     Problem: DISCHARGE PLANNING  Goal: Discharge to home or other facility with appropriate resources  Description: INTERVENTIONS:  - Identify barriers to discharge w/patient and caregiver  - Arrange for needed discharge resources and transportation as appropriate  - Identify discharge learning needs (meds, wound care, etc.)  - Arrange for interpretive services to assist at discharge as needed  - Refer to Case Management Department for coordinating discharge planning if the patient needs post-hospital services based on physician/advanced practitioner order or complex needs related to functional status, cognitive ability, or social support system  Outcome:  Adequate for Discharge     Problem: Knowledge Deficit  Goal: Patient/family/caregiver demonstrates understanding of disease process, treatment plan, medications, and discharge instructions  Description: Complete learning assessment and assess knowledge base.  Interventions:  - Provide teaching at level of understanding  - Provide teaching via preferred learning methods  Outcome: Adequate for Discharge     Problem: COPING  Goal: Pt/Family able to verbalize concerns and demonstrate effective coping strategies  Description: INTERVENTIONS:  - Assist patient/family to identify coping skills, available support systems and cultural and spiritual values  - Provide emotional support, including active listening and acknowledgement of concerns of patient and caregivers  - Reduce environmental stimuli, as able  - Provide patient education  - Assess for spiritual pain/suffering and initiate spiritual care, including notification of Pastoral Care or [facility] based community as needed  - Assess effectiveness of coping strategies  Outcome: Adequate for Discharge  Goal: Will report anxiety at manageable levels  Description: INTERVENTIONS:  - Administer medication as ordered  - Teach and encourage coping skills  - Provide emotional support  - Assess patient/family for anxiety and ability to cope  Outcome: Adequate for Discharge     Problem: DECISION MAKING  Goal: Pt/Family able to effectively weigh alternatives and participate in decision making related to treatment and care  Description: INTERVENTIONS:  - Identify decision maker  - Determine when there are differences among patient's view, family's view, and healthcare provider's view of patient condition and care goals  - Facilitate patient/family articulation of goals for care  - Help patient/family identify pros/cons of alternative solutions  - Provide information as requested by patient/family  - Respect patient/family rights related to privacy and sharing information   - Serve as  a liaison between patient, family and health care team  - Initiate consults as appropriate (Ethics Team, Palliative Care, Family Care Conference, etc.)  Outcome: Adequate for Discharge     Problem: SPIRITUAL CARE  Goal: Pt/Family able to move forward in process of forgiving self, others and/or higher power  Description: INTERVENTIONS:  - Assist patient with any spiritual needs/requests such as communion, confession, anointing, etc  - Explore guilt and help patient/family identify possible spiritual/cultural beliefs and values  - Explore possibilities of making amends & reconciliation with self, others, and/or a greater power  - Guide patient/family in identifying painful feelings  - Help patient explore and identify spiritual beliefs, cultural understandings or values that may help or hinder letting go of issue  - Help patient explore feelings of anger, bitterness, resentment, anxiety   Help patient/family identify and examine the situation in which these feelings are experienced  - Help patient/family identify destructive displacement of feelings onto other individuals  - Refer patient to formal counseling and/or to [facility] for further support as needed or per request  Outcome: Adequate for Discharge  Goal: Patient feels balance and connection with others and/or higher power that empowers the self during times of loss, guilt and fear  Description: INTERVENTIONS:  - Create safety for patient through empathic presence and non-judgmental listening  - Encourage patient to explore his/her values, beliefs and/or spiritual images and practices  - Encourage use of breath work, imagery, meditation, relaxation, reiki to ease distress and provide healing  - Encourage use of cultural and spiritual celebrations and rituals  - Facilitate discussion that helps patient sort out spiritual concerns  - Help patient identify where meaning/hope/comfort & strength are in his/her life  - Refer patient to [facility] for  assistance, as appropriate  - Respond to patient/family need for prayer/ritual/sacrament/ceremony  Outcome: Adequate for Discharge     Problem: POSTPARTUM  Goal: Experiences normal postpartum course  Description: INTERVENTIONS:  - Monitor maternal vital signs  - Assess uterine involution and lochia  Outcome: Adequate for Discharge  Goal: Appropriate maternal -  bonding  Description: INTERVENTIONS:  - Identify family support  - Assess for appropriate maternal/infant bonding   -Encourage maternal/infant bonding opportunities  - Referral to  or  as needed  Outcome: Adequate for Discharge  Goal: Establishment of infant feeding pattern  Description: INTERVENTIONS:  - Assess breast/bottle feeding  - Refer to lactation as needed  Outcome: Adequate for Discharge  Goal: Incision(s), wounds(s) or drain site(s) healing without S/S of infection  Description: INTERVENTIONS  - Assess and document dressing, incision, wound bed, drain sites and surrounding tissue  - Provide patient and family education  - Perform skin care/dressing changes every day  Outcome: Adequate for Discharge

## 2024-05-29 NOTE — LACTATION NOTE
CONSULT - LACTATION  Antony Rodriguez 28 y.o. female MRN: [id number]    [facility] AN L&D Room / Bed: / 321-01 Encounter: [id number]    Maternal Information     MOTHER:  N/A  Maternal Age: This patient's mother is not on file.  OB History: This patient's mother is not on file.  Previouse breast reduction surgery? No    Lactation history:   Has patient previously breast fed: No   How long had patient previously breast fed:     Previous breast feeding complications:     This patient's mother is not on file.    Birth information:  YOB: 1995   Time of birth:     Sex: female   Delivery type:     Birth Weight: No birth weight on file.   Percent of Weight Change: Birth weight not on file     Gestational Age: <None>   [unfilled]    Assessment     Breast and nipple assessment: large breast     Assessment:  baby in NICU       24 1000   Lactation Consultation   Reason for Consult 10 minutes;10 minute;5 min   Lactation Consultant Total Time 25   Risk Factors NICU infant   Breasts/Nipples   Date Pumping Initiated 24   Left Breast Soft   Right Breast Soft   Left Nipple Everted   Right Nipple Everted   Intervention Breast pump;Hand expression   Breastfeeding Status Yes   Breastfeeding Progress Pumping only   Reasons for not Breastfeeding Infant medical condition   Other OB Lactation Tools   Feeding Devices Pump;Syringe   Breast Pump   Pump 2;3  (Spectra Gold)   Patient Follow-Up   Lactation Consult Status 2   Follow-Up Type Inpatient;Call as needed   Other OB Lactation Documentation    Additional Problem Noted Mom pumping for baby in NICU. Mom collecting 2 mLs. Education on initial engorgement. Enc to keep pumping every 2-3 hours. Enc to call for further assistance.       Feeding recommendations:  pump every 2-3 hours    Discharge feeding plan for NICU Pumping     Set alarms to pump every 2 hrs during the day and every 3 hrs at night  Use massage, warmth, &  hand expression to stimulate glandular tissue prior to pumping.  May use nipple cream/butter/oil where tunnel and funnel meet on flange to assist movement of breast tissue inside the flange  Use breast compressions, hands on pumping techniques to assist in expressing milk    Cycle pumping - Begin the pump in stimulation mode. Once milk is visible in the tunnel of the flange, change pump setting to expression mode. Once milk is NOT seen in the tunnel, cycle back to stimulation mode.Continue cycle pumping until end of feeding.    Pump both breasts simultaneously  Use all 5 senses when pumping to increase milk transfer.  Store expressed milk or feed expressed milk via syringe, NG tube or paced bottle feeding method.   Continue to hand express between feeds to stimulate the breasts frequently    Review [employer] on youtube or scan QR code for [employer] video  When with baby, place baby skin to skin. Attempt to latch when medically cleared.         Milk Jose Pena 5/29/2024 10:04 AM

## 2024-05-29 NOTE — UTILIZATION REVIEW
"SEE INITIAL REVIEW AT BOTTOM    Continued Stay Review    Date: 2024                          Current Patient Class: inpatient  Current Level of Care: L&D    Assessment/Plan:   SURGERY DATE: 2024   Procedure(s) (LRB):   SECTION () (N/A)   Anesthesia Type:   * No anesthesia type entered *    Findings:  @ 0241 Viable male weighing 5lbs 1.5oz;  Apgar scores of 8 at one minute, 4 at five minutes, and 9 at 10 minutes    Vital Signs:     24 0655    BP: 121/63   Pulse: 90   Resp: 16   Temp: 97.9 °F (36.6 °C)   SpO2: 94%       Pertinent Labs/Diagnostic Results:       Results from last 7 days   Lab Units 24  1325 24  0143 24  1314 24  0950   WBC Thousand/uL 17.09* 15.25* 9.34 10.39*   HEMOGLOBIN g/dL 9.8* 11.3* 10.3* 10.7*   HEMATOCRIT % 30.7* 34.8 32.5* 33.1*   PLATELETS Thousands/uL 200 219 189 202   TOTAL NEUT ABS Thousands/µL  --  13.17*  --  7.55         Results from last 7 days   Lab Units 24  0647 24  0950   SODIUM mmol/L 137 135   POTASSIUM mmol/L 3.8 3.8   CHLORIDE mmol/L 106 107   CO2 mmol/L 25 20*   ANION GAP mmol/L 6 8   BUN mg/dL 8 6   CREATININE mg/dL 0.62 0.50*   EGFR ml/min/1.73sq m 124 134   CALCIUM mg/dL 8.4 8.7     Results from last 7 days   Lab Units 24  0647 24  0950   AST U/L 15 17   ALT U/L 9 11   ALK PHOS U/L 83 110*   TOTAL PROTEIN g/dL 5.8* 6.7   ALBUMIN g/dL 2.8* 3.2*   TOTAL BILIRUBIN mg/dL 0.25 0.30         Results from last 7 days   Lab Units 24  0647 24  0950   GLUCOSE RANDOM mg/dL 84 82             No results found for: \"BETA-HYDROXYBUTYRATE\"                           Results from last 7 days   Lab Units 24  0143 24  1314   PROTIME seconds 12.5 12.8   INR  0.88 0.90   PTT seconds 23 24                                     Results from last 7 days   Lab Units 24  0733   UNIT PRODUCT CODE  [id number]  [id number]   UNIT NUMBER  [id number]  [id number]   UNITABO  O  O "   UNITRH  NEG  NEG   CROSSMATCH  Compatible  Compatible   UNIT DISPENSE STATUS  Return to Inv  Return to Inv   UNIT PRODUCT VOL ml 350  350                         Results from last 7 days   Lab Units 05/25/24  1355   CLARITY UA  Clear   COLOR UA  Light Yellow   SPEC GRAV UA  1.012   PH UA  7.0   GLUCOSE UA mg/dl Negative   KETONES UA mg/dl Negative   BLOOD UA  Moderate*   PROTEIN UA mg/dl Negative   NITRITE UA  Negative   BILIRUBIN UA  Negative   UROBILINOGEN UA (BE) mg/dl <2.0   LEUKOCYTES UA  Negative   WBC UA /hpf 1-2   RBC UA /hpf None Seen   BACTERIA UA /hpf Moderate*   EPITHELIAL CELLS WET PREP /hpf Occasional                                 Results from last 7 days   Lab Units 05/25/24  1354   URINE CULTURE  20,000-29,000 cfu/ml                   Medications:   Scheduled Medications:  docusate sodium, 100 mg, Oral, BID  enoxaparin, 40 mg, Subcutaneous, Q12H  fluticasone, 1 puff, Inhalation, Daily  ibuprofen, 600 mg, Oral, Q6H  levothyroxine, 200 mcg, Oral, Once per day on Sunday Friday Saturday   And  levothyroxine, 100 mcg, Oral, Once per day on Monday Tuesday Wednesday Thursday  pantoprazole, 40 mg, Oral, Daily Before Breakfast  prenatal multivitamin, 1 tablet, Oral, Daily      Continuous IV Infusions:  lactated ringers, 125 mL/hr, Intravenous, Continuous  oxytocin (PITOCIN) 30 Units in lactated ringers 500 mL infusion  Freq: Continuous PRN Route: IV  Last Dose: 250 real-units/min (05/26/24 0223)  Start: 05/26/24 0223 End: 05/26/24 0258    PRN Meds:  albuterol, 2 puff, Inhalation, Q4H PRN  calcium carbonate, 1,000 mg, Oral, Daily PRN  diphenhydrAMINE, 25 mg, Oral, Q6H PRN  HYDROmorphone, 0.5 mg, Intravenous, Q2H PRN  ondansetron, 4 mg, Intravenous, Q8H PRN  oxyCODONE, 10 mg, Oral, Q4H PRN  oxyCODONE, 5 mg, Oral, Q4H PRN  simethicone, 80 mg, Oral, 4x Daily PRN        Discharge Plan: D    Network Utilization Review Department  ATTENTION: Please call with any questions or concerns to [phone] and  carefully listen to the prompts so that you are directed to the right person. All voicemails are confidential.   For Discharge needs, contact Care Management DC Support Team at [phone] opt. 2  Send all requests for admission clinical reviews, approved or denied determinations and any other requests to dedicated fax number below belonging to the campus where the patient is receiving treatment. List of dedicated fax numbers for the Facilities:  FACILITY NAME UR FAX NUMBER   ADMISSION DENIALS (Administrative/Medical Necessity) [phone]   DISCHARGE SUPPORT TEAM (NETWORK) [phone]   PARENT CHILD HEALTH (Maternity/NICU/Pediatrics) [phone]   [facility] [phone]   [facility] [phone]   [facility] [phone]   [facility] [phone]   [facility] [phone]   [facility] [phone]   [facility] [phone]   [facility] [phone]   [facility] [phone]   [facility] [phone]   [facility] [phone]   [facility] [phone]

## 2024-05-29 NOTE — PROGRESS NOTES
Patient seen and assessed at 08:40 2024, late entry of note due to patient care.     Progress Note - OB/GYN  Antony Rodriguez 28 y.o. female MRN: [id number]  Unit/Bed#: -01 Encounter: [id number]    Assessment and Plan     Antony Rodriguez is a patient of: [facility]. She is POD# 3 s/p  primary  section, low transverse incision  Recovering well and is stable       Gestational hypertension  Assessment & Plan  Meeting criteria postpartum  CBC, CMP wnl  Continue to monitor blood pressures    Systolic (24hrs), Av , Min:115 , Max:140   Diastolic (24hrs), Av, Min:75, Max:87        Rh negative state in antepartum period, third trimester  Assessment & Plan  Received Rhogam at 28w  Recommend additional dose of rhogam on admission     32 weeks gestation of pregnancy  Assessment & Plan  Delivered at 32w due to vaginal bleeding insetting of placenta previa    Crohn's disease of colon without complication (HCC)  Assessment & Plan  Weekly home Humira for crohn's disease    CVID (common variable immunodeficiency) (HCC)  Assessment & Plan  Weekly hizentra, dose adjusted to 11g per Dr. Durand note on  for postpartum    Iron deficiency anemia  Assessment & Plan  Can discontinue outpatient iron infusions.   Start iron supplement every other. Discussion precautions for constipation.     Hypothyroidism due to Hashimoto's thyroiditis  Assessment & Plan  Continue home levothyroxine    Asthma, moderate persistent  Assessment & Plan  Takes Budesonide inhaler  Avoid hemabate if patient were to hemorrhage    * Status post primary low transverse  section  Assessment & Plan  Routine postpartum care  Encourage ambulation  Encourage familial bonding  Lactation support as needed  Pain: Motrin/Tylenol around the clock, oxycodone if needed  Pre-delivery Hgb 11.3 --> 9.8  Voiding spontaneously, s/p arreola  DVT Ppx: ambulation, SCDs, Lovenox 40mg BID        Disposition    - Anticipate discharge home on  "PPD# 3      Subjective/Objective     Chief Complaint: Postpartum State     Subjective:    Antony Rodriguez is PPD/POD#3 s/p  primary  section, low transverse incision. She reports that she is feeling more emotional lately, though denies any SI/HI and has good support. Understandably there was abrupt change in her delivery plan due to vaginal bleeding that was persistent in the setting of a placenta previa prompting delivery at 32.5wks. We discussed her mood and precautions for postpartum depression. Patient safe for discharge and with good support system.     Pain is not well controlled. She notes increased pain with walking and activity. She states that she is trying not to utilize narcotics and is taking tylenol and motrin scheduled. She is recovering well and is stable.     Breastfeeding:  no - pumping, baby in NICU    Tolerating PO: yes  Nausea or Vomiting: no  Voiding: yes  Flatus: yes; has had bowel movement.   Ambulating: yes  Chest pain: no  Shortness of breath: no  Leg pain: no  Lochia: appropriate     Vitals:   /78 (BP Location: Right arm)   Pulse 58   Temp 98.1 °F (36.7 °C) (Oral)   Resp 16   Ht 5' 6\" (1.676 m)   Wt 125 kg (275 lb)   LMP 10/10/2023 (Exact Date)   SpO2 96%   Breastfeeding Yes   BMI 44.39 kg/m²     No intake or output data in the 24 hours ending 24 1346    Invasive Devices       None                   Physical Exam:   GEN: Antony Rodriguez appears well, alert and oriented x 3, pleasant and cooperative   CARDIO: RRR, no murmurs or rubs  RESP:  CTAB, no wheezes or rales  ABDOMEN: soft, no tenderness, no distention, fundus firm 3cm below umbilicus, Incision C/D/I  EXTREMITIES: SCDs on, non tender, no erythema,       Labs:     Hemoglobin   Date Value Ref Range Status   2024 9.8 (L) 11.5 - 15.4 g/dL Final   2024 11.3 (L) 11.5 - 15.4 g/dL Final     WBC   Date Value Ref Range Status   2024 17.09 (H) 4.31 - 10.16 Thousand/uL Final   2024 15.25 (H) " 4.31 - 10.16 Thousand/uL Final     Platelets   Date Value Ref Range Status   05/26/2024 200 149 - 390 Thousands/uL Final   05/26/2024 219 149 - 390 Thousands/uL Final     Creatinine   Date Value Ref Range Status   05/27/2024 0.62 0.60 - 1.30 mg/dL Final     Comment:     Standardized to IDMS reference method   05/25/2024 0.50 (L) 0.60 - 1.30 mg/dL Final     Comment:     Standardized to IDMS reference method   06/14/2022 0.88 0.44 - 1.03 mg/dL Final   10/27/2021 0.78 0.44 - 1.03 mg/dL Final     AST   Date Value Ref Range Status   05/27/2024 15 13 - 39 U/L Final   05/25/2024 17 13 - 39 U/L Final   08/04/2022 28 0 - 40 IU/L Final   11/20/2018 14 10 - 35 U/L Final   03/08/2018 18 10 - 35 U/L Final     ASPAR AMINOTRANSFERASE   Date Value Ref Range Status   06/14/2022 23 15 - 41 U/L Final   10/27/2021 19 15 - 41 U/L Final     ALT   Date Value Ref Range Status   05/27/2024 9 7 - 52 U/L Final     Comment:     Specimen collection should occur prior to Sulfasalazine administration due to the potential for falsely depressed results.    05/25/2024 11 7 - 52 U/L Final     Comment:     Specimen collection should occur prior to Sulfasalazine administration due to the potential for falsely depressed results.    08/04/2022 38 (H) 0 - 32 IU/L Final   11/20/2018 8 (L) 10 - 35 U/L Final   03/08/2018 12 10 - 35 U/L Final     ALANINE AMINOTRANSFERASE   Date Value Ref Range Status   06/14/2022 21 14 - 54 U/L Final   10/27/2021 11 (L) 14 - 54 U/L Final          Cassie Alicea MD  5/29/2024  1:46 PM

## 2024-05-30 ENCOUNTER — TELEPHONE (OUTPATIENT)
Age: 29
End: 2024-05-30

## 2024-05-30 ENCOUNTER — TELEPHONE (OUTPATIENT)
Dept: OBGYN CLINIC | Facility: CLINIC | Age: 29
End: 2024-05-30

## 2024-05-30 NOTE — UTILIZATION REVIEW
"NOTIFICATION OF INPATIENT ADMISSION   MATERNITY/DELIVERY AUTHORIZATION REQUEST   SERVICING FACILITY:   [facility]  Parent Child Health - L&D, [place], NICU  [address] [place] [address]  Tax ID: [id number]  NPI: [id number]   ATTENDING PROVIDER:  Attending Name and NPI#: Diana Khalil Do [[id number]]  Address: [address] [place] [address]  Phone: [phone]   ADMISSION INFORMATION:  Place of Service: Inpatient [facility]  Place of Service Code: 21  Inpatient Admission Date/Time: 24  1:55 AM  Discharge Date/Time: 2024  4:50 PM  Admitting Diagnosis Code/Description:  Vaginal bleeding during pregnancy, antepartum [O46.90]  Encounter for  delivery without indication [O82]     Mother: Antony Rodriguez 1995 Estimated Date of Delivery: 24  Delivering clinician: Diana Khalil   OB History          1    Para   1    Term   0       1    AB   0    Living   1         SAB   0    IAB   0    Ectopic   0    Multiple   0    Live Births   1           Obstetric Comments   Menarche 16             [place] Name & MRN:   Information for the patient's :  Michael, Baby Boy (Antony) [[id number]]   [place] Delivery Information:  Sex: male  Delivered 2024 2:21 AM by , Low Transverse; Gestational Age: 32w5d    [place] Measurements:  Weight: 5 lb 1.5 oz (2310 g);  Height: 42\"    APGAR 1 minute 5 minutes 10 minutes   Totals: 8 4 9      UTILIZATION REVIEW CONTACT:  Karissa Sparks Utilization   Network Utilization Review Department  Phone: [phone]  Fax [phone]  Email: [email or web address]  Contact for approvals/pending authorizations, clinical reviews, and discharge.     PHYSICIAN ADVISORY SERVICES:  Medical Necessity Denial & Srrp-nl-Onjd Review  Phone: [phone]  Fax: [phone]  Email: [email or web address]     DISCHARGE SUPPORT TEAM:  For Patients Discharge Needs & " Updates  Phone: [phone] opt. 2 Fax: [phone]  Email: [email or web address]        Initial Clinical Review  OBS 05-25-24 @ 1223 CONVERTED TO INPATIENT 05-26-23 @ 0654 FOR VAGINAL BLEEDING @ 32 4/7 DAYS AND THE NEED FOR DELIVERY D/R VAGINAL BLEEDING IN A KNOWN PLACENTA PREVIA.    Admission: Date/Time/Statement:   Admission Orders (From admission, onward)       Ordered        05/26/24 0654  INPATIENT ADMISSION  Once            05/25/24 1223  Place in Observation  Once                          Orders Placed This Encounter   Procedures    Place in Observation     Standing Status:   Standing     Number of Occurrences:   1     Order Specific Question:   Level of Care     Answer:   Med Surg [16]    INPATIENT ADMISSION     Standing Status:   Standing     Number of Occurrences:   1     Order Specific Question:   Level of Care     Answer:   Med Surg [16]     Order Specific Question:   Estimated length of stay     Answer:   More than 2 Midnights     Order Specific Question:   Certification     Answer:   I certify that inpatient services are medically necessary for this patient for a duration of greater than two midnights. See H&P and MD Progress Notes for additional information about the patient's course of treatment.         Chief Complaint   Patient presents with    Vaginal Bleeding     Bright red bleeding        Initial Presentation: 28 y.o. female     Anticipated Length of Stay/Certification Statement:     Date:    Day 2:     Admitting Vitals   Temperature Pulse Respirations Blood Pressure SpO2   05/25/24 1633 05/25/24 1115 05/25/24 1115 05/25/24 1115 05/25/24 1115   98.2 °F (36.8 °C) 84 18 133/78 100 %      Temp Source Heart Rate Source Patient Position - Orthostatic VS BP Location FiO2 (%)   05/25/24 1633 05/25/24 1115 05/25/24 1115 05/25/24 1115 --   Oral Monitor Lying Right arm       Pain Score       05/25/24 1115       3          Wt Readings from Last 1 Encounters:   05/25/24 125 kg (275 lb)      Additional Vital Signs:   Pertinent Labs/Diagnostic Test Results:   No orders to display         Results from last 7 days   Lab Units 05/26/24  1325 05/26/24  0143 05/25/24  1314 05/25/24  0950   WBC Thousand/uL 17.09* 15.25* 9.34 10.39*   HEMOGLOBIN g/dL 9.8* 11.3* 10.3* 10.7*   HEMATOCRIT % 30.7* 34.8 32.5* 33.1*   PLATELETS Thousands/uL 200 219 189 202   TOTAL NEUT ABS Thousands/µL  --  13.17*  --  7.55         Results from last 7 days   Lab Units 05/27/24  0647 05/25/24  0950   SODIUM mmol/L 137 135   POTASSIUM mmol/L 3.8 3.8   CHLORIDE mmol/L 106 107   CO2 mmol/L 25 20*   ANION GAP mmol/L 6 8   BUN mg/dL 8 6   CREATININE mg/dL 0.62 0.50*   EGFR ml/min/1.73sq m 124 134   CALCIUM mg/dL 8.4 8.7     Results from last 7 days   Lab Units 05/27/24  0647 05/25/24  0950   AST U/L 15 17   ALT U/L 9 11   ALK PHOS U/L 83 110*   TOTAL PROTEIN g/dL 5.8* 6.7   ALBUMIN g/dL 2.8* 3.2*   TOTAL BILIRUBIN mg/dL 0.25 0.30         Results from last 7 days   Lab Units 05/27/24  0647 05/25/24  0950   GLUCOSE RANDOM mg/dL 84 82         Results from last 7 days   Lab Units 05/26/24  0143 05/25/24  1314   PROTIME seconds 12.5 12.8   INR  0.88 0.90   PTT seconds 23 24     Results from last 7 days   Lab Units 05/28/24  0733   UNIT PRODUCT CODE  [id number]  [id number]   UNIT NUMBER  [id number]  [id number]   UNITABO  O  O   UNITRH  NEG  NEG   CROSSMATCH  Compatible  Compatible   UNIT DISPENSE STATUS  Return to Inv  Return to Inv   UNIT PRODUCT VOL ml 350  350     Results from last 7 days   Lab Units 05/25/24  1355   CLARITY UA  Clear   COLOR UA  Light Yellow   SPEC GRAV UA  1.012   PH UA  7.0   GLUCOSE UA mg/dl Negative   KETONES UA mg/dl Negative   BLOOD UA  Moderate*   PROTEIN UA mg/dl Negative   NITRITE UA  Negative   BILIRUBIN UA  Negative   UROBILINOGEN UA (BE) mg/dl <2.0   LEUKOCYTES UA  Negative   WBC UA /hpf 1-2   RBC UA /hpf None Seen   BACTERIA UA /hpf Moderate*   EPITHELIAL CELLS WET PREP /hpf Occasional        Past Medical History:   Diagnosis Date    Anemia     Anxiety     Asthma     Colitis, chronic, ulcerative (HCC)     Disease of thyroid gland     Endometriosis     GERD (gastroesophageal reflux disease)     Hashimoto's disease     Heartburn     Hypothyroidism     Immune deficiency disorder (HCC)     Irregular menses 2022    Migraine     Palpitations 2022    Pneumonia     Rash     Ulcerative colitis (HCC)     crohn's disease, takes Humara weekly    Urinary tract infection     Urticaria     Varicella     disease as child and vaccinated     Present on Admission:   Asthma, moderate persistent   CVID (common variable immunodeficiency) (HCC)   Crohn's disease of colon without complication (HCC)   Hypothyroidism due to Hashimoto's thyroiditis   Rh negative state in antepartum period, third trimester   Iron deficiency anemia   Gestational hypertension      Admitting Diagnosis: Vaginal bleeding during pregnancy, antepartum [O46.90]  Encounter for  delivery without indication [O82]  Age/Sex: 28 y.o. female  Admission Orders:  Scheduled Medications:  No current facility-administered medications for this encounter.    Continuous IV Infusions:  No current facility-administered medications for this encounter.    PRN Meds:  No current facility-administered medications for this encounter.      IP CONSULT TO PERINATOLOGY    Network Utilization Review Department  ATTENTION: Please call with any questions or concerns to [phone] and carefully listen to the prompts so that you are directed to the right person. All voicemails are confidential.   For Discharge needs, contact Care Management DC Support Team at [phone] opt. 2  Send all requests for admission clinical reviews, approved or denied determinations and any other requests to dedicated fax number below belonging to the campus where the patient is receiving treatment. List of dedicated fax numbers for the Facilities:  FACILITY NAME UR FAX NUMBER    ADMISSION DENIALS (Administrative/Medical Necessity) [phone]   DISCHARGE SUPPORT TEAM (NETWORK) [phone]   PARENT CHILD HEALTH (Maternity/NICU/Pediatrics) [phone]   [facility] [phone]   [facility] [phone]   [facility] [phone]   [facility] [phone]   [facility] [phone]   [facility] [phone]   [facility] [phone]   [facility] [phone]   [facility] [phone]   [facility] [phone]   [facility] [phone]   [facility] [phone]

## 2024-05-31 ENCOUNTER — PATIENT MESSAGE (OUTPATIENT)
Dept: OBGYN CLINIC | Facility: CLINIC | Age: 29
End: 2024-05-31

## 2024-05-31 ENCOUNTER — APPOINTMENT (OUTPATIENT)
Dept: PHYSICAL THERAPY | Age: 29
End: 2024-05-31
Payer: COMMERCIAL

## 2024-06-01 ENCOUNTER — HOSPITAL ENCOUNTER (OUTPATIENT)
Dept: INFUSION CENTER | Facility: HOSPITAL | Age: 29
Discharge: HOME | End: 2024-06-01
Attending: OBSTETRICS & GYNECOLOGY

## 2024-06-01 LAB — PLACENTA IN STORAGE: NORMAL

## 2024-06-03 NOTE — PATIENT COMMUNICATION
Placed call to the patient~    POSTPARTUM PHONE CALL ASSESSMENT    Date of Delivery: 2024  Delivering Provider:   Mode:   Delivery Notes/Complications: placenta previa   Do you still have bleeding/pain? If so, how much/how severe? Minimal bleeding, pain when walking around or does too much   Regular BMs/Urination? yes  Breastfeeding/Formula/Both? Pumping for now, unable to suck / swallow but will eventually breast feed once able to (baby in NICU, borna at 32 weeks)  How are you doing emotionally? Managing appropriately. Gets sad to leave the NICU at night.   EPDS Score: 10, has good support system.   Do you have any other questions or concerns for us or your provider? None at this time  Have you scheduled the pediatrician appointment with pediatrician? N/A, in NICU at this time  Do you have a postpartum visit scheduled? yes   Date scheduled: 2024 Provider:     Patient is agreeable to contact the office with any questions or concerns.

## 2024-06-07 ENCOUNTER — OFFICE VISIT (OUTPATIENT)
Dept: OBGYN CLINIC | Facility: CLINIC | Age: 29
End: 2024-06-07

## 2024-06-07 VITALS
WEIGHT: 254.6 LBS | BODY MASS INDEX: 40.92 KG/M2 | DIASTOLIC BLOOD PRESSURE: 80 MMHG | SYSTOLIC BLOOD PRESSURE: 122 MMHG | HEIGHT: 66 IN

## 2024-06-07 PROCEDURE — 99024 POSTOP FOLLOW-UP VISIT: CPT | Performed by: PHYSICIAN ASSISTANT

## 2024-06-07 NOTE — PATIENT INSTRUCTIONS
Keep incision clean and dry  Continue current restrictions.  Do not lift >10lbs  Do not submerge in water   Limit exercise to walking  Patient to call for concerns  Return for routine postpartum visit

## 2024-06-07 NOTE — PROGRESS NOTES
"Assessment/Plan:   Keep incision clean and dry  May remove glue over the next 2 weeks  Continue current restrictions.  Do not lift >10lbs  Do not submerge in water   Limit exercise to walking  Patient to call for concerns  RTO for routine postpartum visit     Diagnoses and all orders for this visit:    Postpartum care following  delivery          Subjective:     Patient ID: Antony Rodriguez is a 28 y.o. female.    Antony is a 27YO  WF presenting to the office for an incision check 12 days s/p 1LTCS at 32.5 weeks for placental abruption in the setting of a placenta previa. Patient is doing well today and has no complaints. She is healing well and denies any problems with her incision. She reports normal bowel and bladder function with normal appetite. She reports her vaginal bleeding is tapering off and she has not had any yet today. Baby Mark[place] is still in the NICU but is doing well. She is feeling well emotionally and has good support.          OB History          1    Para   1    Term   0       1    AB   0    Living   1         SAB   0    IAB   0    Ectopic   0    Multiple   0    Live Births   1           Obstetric Comments   Menarche 16                 Review of Systems   Constitutional:  Negative for fever.   Respiratory:  Negative for shortness of breath.    Cardiovascular:  Negative for chest pain.   Gastrointestinal:  Negative for abdominal pain, constipation, diarrhea, nausea and vomiting.   Genitourinary:  Positive for vaginal bleeding (minimal). Negative for difficulty urinating, dysuria, pelvic pain and vaginal pain.   Skin:  Negative for rash.   Psychiatric/Behavioral:  Negative for dysphoric mood. The patient is not nervous/anxious.          Objective:  Visit Vitals  /80 (BP Location: Right arm, Patient Position: Sitting, Cuff Size: Large)   Ht 5' 6\" (1.676 m)   Wt 115 kg (254 lb 9.6 oz)   LMP 10/10/2023 (Exact Date)   Breastfeeding Yes Comment: pumping, baby in NICU   BMI " 41.09 kg/m²   OB Status Recent pregnancy   Smoking Status Never   BSA 2.21 m²        Physical Exam  Vitals reviewed.   Constitutional:       Appearance: Normal appearance.   HENT:      Head: Normocephalic and atraumatic.   Pulmonary:      Effort: Pulmonary effort is normal.   Abdominal:      General: Abdomen is flat. A surgical scar is present. There is no distension.      Palpations: Abdomen is soft.      Tenderness: There is no abdominal tenderness.      Comments: Incision clean and dry. No warmth or surrounding erythema. No oozing, bleeding or drainage from incision. Residual glue overlying incision.    Skin:     General: Skin is warm and dry.      Findings: No lesion or rash.   Neurological:      General: No focal deficit present.      Mental Status: She is alert.   Psychiatric:         Mood and Affect: Mood normal.         Behavior: Behavior normal.         Thought Content: Thought content normal.

## 2024-06-17 ENCOUNTER — APPOINTMENT (OUTPATIENT)
Dept: LAB | Facility: AMBULARY SURGERY CENTER | Age: 29
End: 2024-06-17
Payer: COMMERCIAL

## 2024-06-17 ENCOUNTER — POSTPARTUM VISIT (OUTPATIENT)
Dept: OBGYN CLINIC | Facility: CLINIC | Age: 29
End: 2024-06-17

## 2024-06-17 VITALS
WEIGHT: 253 LBS | SYSTOLIC BLOOD PRESSURE: 120 MMHG | HEIGHT: 66 IN | BODY MASS INDEX: 40.66 KG/M2 | DIASTOLIC BLOOD PRESSURE: 74 MMHG

## 2024-06-17 DIAGNOSIS — E03.8 HYPOTHYROIDISM DUE TO HASHIMOTO'S THYROIDITIS: ICD-10-CM

## 2024-06-17 DIAGNOSIS — E06.3 HYPOTHYROIDISM DUE TO HASHIMOTO'S THYROIDITIS: ICD-10-CM

## 2024-06-17 PROBLEM — Z3A.32 32 WEEKS GESTATION OF PREGNANCY: Status: RESOLVED | Noted: 2024-04-25 | Resolved: 2024-06-17

## 2024-06-17 PROBLEM — O13.9 GESTATIONAL HYPERTENSION: Status: RESOLVED | Noted: 2024-05-26 | Resolved: 2024-06-17

## 2024-06-17 PROBLEM — O44.00: Status: RESOLVED | Noted: 2024-05-26 | Resolved: 2024-06-17

## 2024-06-17 PROBLEM — O99.019 IRON DEFICIENCY ANEMIA DURING PREGNANCY: Status: RESOLVED | Noted: 2024-04-25 | Resolved: 2024-06-17

## 2024-06-17 PROBLEM — Z67.91 RH NEGATIVE STATE IN ANTEPARTUM PERIOD, THIRD TRIMESTER: Status: RESOLVED | Noted: 2024-05-10 | Resolved: 2024-06-17

## 2024-06-17 PROBLEM — Z98.891 STATUS POST PRIMARY LOW TRANSVERSE CESAREAN SECTION: Chronic | Status: RESOLVED | Noted: 2024-03-08 | Resolved: 2024-06-17

## 2024-06-17 PROBLEM — D50.9 IRON DEFICIENCY ANEMIA DURING PREGNANCY: Status: RESOLVED | Noted: 2024-04-25 | Resolved: 2024-06-17

## 2024-06-17 PROBLEM — O26.893 RH NEGATIVE STATE IN ANTEPARTUM PERIOD, THIRD TRIMESTER: Status: RESOLVED | Noted: 2024-05-10 | Resolved: 2024-06-17

## 2024-06-17 LAB — TSH SERPL DL<=0.05 MIU/L-ACNC: 0.59 UIU/ML (ref 0.45–4.5)

## 2024-06-17 PROCEDURE — 84443 ASSAY THYROID STIM HORMONE: CPT

## 2024-06-17 PROCEDURE — 99024 POSTOP FOLLOW-UP VISIT: CPT | Performed by: OBSTETRICS & GYNECOLOGY

## 2024-06-17 NOTE — PATIENT INSTRUCTIONS
I placed an order for your TSH to be drawn 4-6 weeks post partum and I encourage you to followup with your primary care doctor for management of synthroid outside pregnancy.

## 2024-06-17 NOTE — PROGRESS NOTES
Assessment & Plan     Normal postpartum exam.     1. Contraception: none  2. Annual exam due in February.   3. Increase activity as tolerated, may resume all normal activity.  4. Lactation consult needed: no; if yes, [facility] information discussed. Mom and baby doing well with breastfeeding.     Diagnoses and all orders for this visit:    Encounter for postpartum visit    Hypothyroidism due to Hashimoto's thyroiditis  -     TSH, 3rd generation with Free T4 reflex; Future      Subjective   Chief Complaint   Patient presents with    Postpartum Care       Antony Rodriguez is a 28 y.o.  female who presents for a postpartum visit.     Delivery Method: , Low Transverse   Delivery Date and Time: 2024 2:21 AM  Delivery Attending: Diana Khalil  Gestational Age at delivery: 32w5d   Route of Delivery: , Low Transverse  Reason for  delivery:   placenta previa, bleeding/abruption.     Admitting Diagnoses:   Patient Active Problem List   Diagnosis    Chronic migraine without aura without status migrainosus, not intractable    Asthma, moderate persistent    Chronic rhinitis    Hypothyroidism due to Hashimoto's thyroiditis    IBS (irritable bowel syndrome)    Iron deficiency anemia    Unequal leg length    Vitamin D deficiency    GERD (gastroesophageal reflux disease)    ADHD (attention deficit hyperactivity disorder), combined type    CVID (common variable immunodeficiency) (HCC)    Endometriosis    Crohn's disease of colon without complication (HCC)    Status post primary low transverse  section    Iron deficiency anemia during pregnancy    32 weeks gestation of pregnancy    Rh negative state in antepartum period, third trimester    Placenta previa without hemorrhage, delivered, current hospitalization    Gestational hypertension       Gestational Diabetes: no  Pregnancy Complications: gestational HTN, placenta previa, and placental abruption    Anesthesia: Spinal,      Delivery: , Low Transverse at 2024 2:21 AM      Baby's Name: Markel  Baby's Weight: 5#1  Apgar scores: 8  and 4  at 1 and 5 minutes, respectively  Breast or formula: Breastfeeding & pumping  Pediatrician: Mckay Finnegan   Still in NICU, hopefully home this week.     Bleeding no bleeding. Bowel function: normal. Bladder function: normal. The patient is having pain at times at her incision. She is using tylenol as needed.     Patient has not been sexually active. Desired contraception method is none.     Postpartum depression screening: positive. EPDS : 8. She denies depression/anxiety/trouble sleeping.     Last Pap : 10/6/23 ; no abnormalities      The following portions of the patient's history were reviewed and updated as appropriate: allergies, current medications, past family history, past medical history, past social history, past surgical history, and problem list.      Current Outpatient Medications:     Adalimumab 40 MG/0.4ML PNKT, Inject 40 mg under the skin every 7 days Maintenance dose., Disp: 4 each, Rfl: 5    albuterol (PROVENTIL HFA,VENTOLIN HFA) 90 mcg/act inhaler, Inhale 2 puffs every 4 (four) hours as needed for wheezing, Disp: 18 g, Rfl: 3    budesonide (Pulmicort Flexhaler) 90 MCG/ACT inhaler, Inhale 1 puff 2 (two) times a day as needed (wheezing) Rinse mouth after use., Disp: 1 each, Rfl: 3    cholecalciferol (VITAMIN D3) 1,000 units tablet, Take 1,000 Units by mouth daily, Disp: , Rfl:     Immune Globulin, Human, (Hizentra) 10 GM/50ML SOLN, Inject 11 g under the skin once a week, Disp: 220 mL, Rfl: 11    levocetirizine (Xyzal Allergy 24HR) 5 MG tablet, Take 1 tablet by mouth daily at bedtime, Disp: , Rfl:     omeprazole (PriLOSEC) 40 MG capsule, Take 1 capsule (40 mg total) by mouth daily, Disp: 90 capsule, Rfl: 1    oxyCODONE (Roxicodone) 5 immediate release tablet, Take 1 tablet (5 mg total) by mouth every 6 (six) hours as needed for severe pain for up to 15 doses Max Daily Amount:  "20 mg, Disp: 15 tablet, Rfl: 0    Prenatal-FeFum-FA-DHA w/o A (PRENATAL + DHA PO), Take by mouth in the morning, Disp: , Rfl:     Synthroid 100 MCG tablet, Take 1 tablet 5 days a week and 2 tabs 2 days a week, Disp: 90 tablet, Rfl: 3    ibuprofen (MOTRIN) 600 mg tablet, Take 1 tablet (600 mg total) by mouth every 6 (six) hours as needed for mild pain (Patient not taking: Reported on 6/17/2024), Disp: 60 tablet, Rfl: 0    Allergies   Allergen Reactions    Bee Venom Anaphylaxis     breathing  breathing      Cinnamon - Food Allergy Anaphylaxis     Lab test 0.  Per pt she has tolerated many times by accident, although has not had formal food challenge by allergy    Albuterol Tachycardia       Review of Systems  Review of Systems   Constitutional:  Negative for chills and fever.        Occasional night sweats.    Respiratory:  Negative for shortness of breath.    Cardiovascular:  Negative for chest pain and leg swelling.   Gastrointestinal:  Negative for abdominal distention, abdominal pain, constipation, nausea and vomiting.   Genitourinary:  Negative for dysuria, frequency, urgency, vaginal bleeding and vaginal discharge.   Neurological:  Negative for headaches.         Objective      /74 (BP Location: Right arm, Patient Position: Sitting, Cuff Size: Large)   Ht 5' 6\" (1.676 m)   Wt 115 kg (253 lb)   LMP 10/10/2023 (Exact Date)   Breastfeeding Yes   BMI 40.84 kg/m²     Physical Exam  Constitutional:       General: She is not in acute distress.     Appearance: Normal appearance. She is well-developed. She is not ill-appearing.   Pulmonary:      Effort: Pulmonary effort is normal. No respiratory distress.   Abdominal:      General: Abdomen is flat.      Palpations: Abdomen is soft.   Neurological:      General: No focal deficit present.      Mental Status: She is alert and oriented to person, place, and time.   Psychiatric:         Mood and Affect: Mood normal.         Behavior: Behavior normal.         " Thought Content: Thought content normal.         Judgment: Judgment normal.   Vitals and nursing note reviewed.         Incision: clean, dry, and intact, skin glue removed.

## 2024-06-18 NOTE — TELEPHONE ENCOUNTER
Placed call to the patient to schedule incision check and post partum visit. Left a voicemail for her to return my call to have these appointments scheduled.   This RN to the bedside for rounding. Patient update on wait times for current scans at this time. Patient verbalizes understanding. Patient denies further needs. Patient VSS. Respirations are easy and unlabored. Patient appears to be in no current distress at this time. Family at the bedside.

## 2024-06-19 ENCOUNTER — OFFICE VISIT (OUTPATIENT)
Dept: ENDOCRINOLOGY | Facility: HOSPITAL | Age: 29
End: 2024-06-19
Payer: COMMERCIAL

## 2024-06-19 VITALS
HEIGHT: 66 IN | DIASTOLIC BLOOD PRESSURE: 80 MMHG | SYSTOLIC BLOOD PRESSURE: 110 MMHG | HEART RATE: 92 BPM | BODY MASS INDEX: 40.88 KG/M2 | WEIGHT: 254.4 LBS

## 2024-06-19 DIAGNOSIS — E06.3 HYPOTHYROIDISM DUE TO HASHIMOTO'S THYROIDITIS: Primary | ICD-10-CM

## 2024-06-19 DIAGNOSIS — E83.51 HYPOCALCEMIA: ICD-10-CM

## 2024-06-19 DIAGNOSIS — E03.8 HYPOTHYROIDISM DUE TO HASHIMOTO'S THYROIDITIS: Primary | ICD-10-CM

## 2024-06-19 PROCEDURE — 99213 OFFICE O/P EST LOW 20 MIN: CPT | Performed by: PHYSICIAN ASSISTANT

## 2024-06-19 NOTE — PATIENT INSTRUCTIONS
Continue to get thyroid lab work at a monthly basis.    Continue with Synthroid 100 mcg daily.     Follow up in 6 months.

## 2024-06-19 NOTE — PROGRESS NOTES
Antony Rodriguez 28 y.o. female MRN: [id number]    Encounter: [id number]      Assessment & Plan     Assessment:  This is a 28 y.o.-year-old female with hypothyroidism due to Hashimoto's thyroiditis and history of hyperparathyroidism.    Plan:  1.  Hypothyroidism due to Hashimoto's thyroiditis: Most recent thyroid lab work came back normal.  Clinically and biochemically euthyroid.  At this time she will continue with Synthroid 100 mcg daily.  Contact the office if there is any change in symptoms.  At this time we will have her follow-up in 6 months with lab work completed prior to visit.     2.  Hypocalcemia: Calcium levels were in normal range.  We will continue to monitor over time.    CC: Hypothyroidism and history of hyperparathyroidism follow-up    History of Present Illness     HPI:  Antony Rodriguez is a 28 year old female with hypothyroidism due to Hashimoto's thyroiditis, common variable immunodeficiency, vitamin-D deficiency, colitis presents for follow-up.  She was diagnosed with hypothyroidism about 8 years ago.  She was initially on levothyroxine, but was switched to Synthroid 100 mcg daily at her visit in 2021 as her thyroid function was normal, but continued to have fatigue symptoms.   She takes medication appropriately.  Does have a history of endometriosis.  Is currently in school to become a PTA.   Overall she is feeling okay.  She underwent an emergency  and her son was born at 34 weeks.  Is still in the NICU at this time, but she is expecting to take him home this week.  No significant neck compressive symptoms but occasionally notes some discomfort.  Denies any fatigue, heat or cold intolerance, chest pain, palpitations, abdominal pain, constipation, tremors, anxiety or depression, hair loss, dry skin, insomnia. Has some diarrhea due to her UC.  There has been concerns with hyperparathyroidism in the past, but of note she is also had mild hypocalcemia from time to time.  Is currently  taking a vitamin D supplement.  No concerns or questions at this time.    Review of Systems   Constitutional:  Negative for activity change, appetite change, diaphoresis, fatigue and unexpected weight change.   HENT:  Negative for sore throat, trouble swallowing and voice change.    Eyes:  Negative for visual disturbance.   Respiratory:  Negative for chest tightness and shortness of breath.    Cardiovascular:  Negative for chest pain, palpitations and leg swelling.   Gastrointestinal:  Positive for diarrhea. Negative for abdominal pain and constipation.   Endocrine: Negative for cold intolerance, heat intolerance, polydipsia, polyphagia and polyuria.   Skin:  Negative for rash.   Neurological:  Negative for dizziness, tremors, light-headedness, numbness and headaches.   Hematological:  Negative for adenopathy.   Psychiatric/Behavioral:  Negative for dysphoric mood and sleep disturbance. The patient is not nervous/anxious.        Historical Information   Past Medical History:   Diagnosis Date   • Anemia    • Anxiety    • Asthma    • Colitis, chronic, ulcerative (HCC)    • Disease of thyroid gland    • Endometriosis    • GERD (gastroesophageal reflux disease)    • Gestational hypertension 2024   • Hashimoto's disease    • Heartburn    • Hypothyroidism    • Immune deficiency disorder (HCC)    • Irregular menses 2022   • Migraine    • Palpitations 2022   • Pneumonia    • Rash    • Status post primary low transverse  section 2024   • Ulcerative colitis (HCC)     crohn's disease, takes Humara weekly   • Urinary tract infection    • Urticaria    • Varicella     disease as child and vaccinated     Past Surgical History:   Procedure Laterality Date   • ADENOIDECTOMY     • COLONOSCOPY     • COLPOSCOPY     • EAR SURGERY Bilateral     Tubes   • MEDIPORT INSERTION, SINGLE      x2   • MOLE REMOVAL     • MD  DELIVERY ONLY N/A 2024    Procedure:  SECTION ();  Surgeon:  Diana Khalil, ;  Location: [facility] ;  Service: Obstetrics     Social History   Social History     Substance and Sexual Activity   Alcohol Use Yes    Comment: Social drink.     Social History     Substance and Sexual Activity   Drug Use Never     Social History     Tobacco Use   Smoking Status Never   Smokeless Tobacco Never     Family History:   Family History   Problem Relation Age of Onset   • Diabetes Mother    • Migraines Mother    • DEDE disease Mother    • Asthma Mother    • Allergies Mother    • Diabetes type II Mother    • Infertility Mother         And father   • Bipolar disorder Father    • Anxiety disorder Father    • Hypertension Father    • Allergies Father    • Hypertension Maternal Grandmother        Meds/Allergies   Current Outpatient Medications   Medication Sig Dispense Refill   • Adalimumab 40 MG/0.4ML PNKT Inject 40 mg under the skin every 7 days Maintenance dose. 4 each 5   • albuterol (PROVENTIL HFA,VENTOLIN HFA) 90 mcg/act inhaler Inhale 2 puffs every 4 (four) hours as needed for wheezing 18 g 3   • budesonide (Pulmicort Flexhaler) 90 MCG/ACT inhaler Inhale 1 puff 2 (two) times a day as needed (wheezing) Rinse mouth after use. 1 each 3   • cholecalciferol (VITAMIN D3) 1,000 units tablet Take 1,000 Units by mouth daily     • Immune Globulin, Human, (Hizentra) 10 GM/50ML SOLN Inject 11 g under the skin once a week 220 mL 11   • levocetirizine (Xyzal Allergy 24HR) 5 MG tablet Take 1 tablet by mouth daily at bedtime     • omeprazole (PriLOSEC) 40 MG capsule Take 1 capsule (40 mg total) by mouth daily 90 capsule 1   • Prenatal-FeFum-FA-DHA w/o A (PRENATAL + DHA PO) Take by mouth in the morning     • Synthroid 100 MCG tablet Take 1 tablet 5 days a week and 2 tabs 2 days a week (Patient taking differently: Take 100 mcg by mouth daily) 90 tablet 3   • oxyCODONE (Roxicodone) 5 immediate release tablet Take 1 tablet (5 mg total) by mouth every 6 (six) hours as needed for severe pain for up to 15  "doses Max Daily Amount: 20 mg (Patient not taking: Reported on 6/19/2024) 15 tablet 0     No current facility-administered medications for this visit.     Allergies   Allergen Reactions   • Bee Venom Anaphylaxis     breathing  breathing     • Cinnamon - Food Allergy Anaphylaxis     Lab test 0.  Per pt she has tolerated many times by accident, although has not had formal food challenge by allergy   • Albuterol Tachycardia       Objective   Vitals: Blood pressure 110/80, pulse 92, height 5' 6\" (1.676 m), weight 115 kg (254 lb 6.4 oz), last menstrual period 10/10/2023, currently breastfeeding.    Physical Exam  Vitals and nursing note reviewed.   Constitutional:       General: She is not in acute distress.     Appearance: Normal appearance. She is not diaphoretic.   HENT:      Head: Normocephalic and atraumatic.   Eyes:      General: No scleral icterus.     Extraocular Movements: Extraocular movements intact.      Conjunctiva/sclera: Conjunctivae normal.      Pupils: Pupils are equal, round, and reactive to light.   Cardiovascular:      Rate and Rhythm: Normal rate and regular rhythm.      Heart sounds: No murmur heard.  Pulmonary:      Effort: Pulmonary effort is normal. No respiratory distress.      Breath sounds: Normal breath sounds. No wheezing.   Musculoskeletal:      Cervical back: Normal range of motion.      Right lower leg: No edema.      Left lower leg: No edema.   Lymphadenopathy:      Cervical: No cervical adenopathy.   Neurological:      Mental Status: She is alert and oriented to person, place, and time. Mental status is at baseline.      Sensory: No sensory deficit.      Gait: Gait normal.   Psychiatric:         Mood and Affect: Mood normal.         Behavior: Behavior normal.         Thought Content: Thought content normal.         The history was obtained from the review of the chart, patient.    Lab Results:   Lab Results   Component Value Date/Time    TSH 3RD [facility] 0.587 06/17/2024 09:08 AM    " "TSH 3RD [facility] 0.957 04/24/2024 01:02 PM    TSH 3RD [facility] 1.432 03/11/2024 12:50 PM    Free T4 0.98 06/17/2024 09:08 AM    Free T4 0.58 (L) 04/24/2024 01:02 PM    Free T4 0.56 (L) 03/11/2024 12:50 PM         Portions of the record may have been created with voice recognition software. Occasional wrong word or \"sound a like\" substitutions may have occurred due to the inherent limitations of voice recognition software. Read the chart carefully and recognize, using context, where substitutions have occurred.    "

## 2024-06-29 DIAGNOSIS — E03.8 HYPOTHYROIDISM DUE TO HASHIMOTO'S THYROIDITIS: ICD-10-CM

## 2024-06-29 DIAGNOSIS — E06.3 HYPOTHYROIDISM DUE TO HASHIMOTO'S THYROIDITIS: ICD-10-CM

## 2024-06-29 RX ORDER — LEVOTHYROXINE SODIUM 100 MCG
TABLET ORAL
Qty: 90 TABLET | Refills: 1 | Status: SHIPPED | OUTPATIENT
Start: 2024-06-29

## 2024-07-02 ENCOUNTER — LAB (OUTPATIENT)
Dept: LAB | Facility: CLINIC | Age: 29
End: 2024-07-02
Payer: COMMERCIAL

## 2024-07-02 DIAGNOSIS — D83.9 CVID (COMMON VARIABLE IMMUNODEFICIENCY) (HCC): ICD-10-CM

## 2024-07-02 LAB
BASOPHILS # BLD AUTO: 0.03 THOUSANDS/ÂΜL (ref 0–0.1)
BASOPHILS NFR BLD AUTO: 0 % (ref 0–1)
EOSINOPHIL # BLD AUTO: 0.13 THOUSAND/ÂΜL (ref 0–0.61)
EOSINOPHIL NFR BLD AUTO: 1 % (ref 0–6)
ERYTHROCYTE [DISTWIDTH] IN BLOOD BY AUTOMATED COUNT: 16.9 % (ref 11.6–15.1)
HCT VFR BLD AUTO: 41.8 % (ref 34.8–46.1)
HGB BLD-MCNC: 14 G/DL (ref 11.5–15.4)
IGG SERPL-MCNC: 1136 MG/DL (ref 635–1741)
IMM GRANULOCYTES # BLD AUTO: 0.02 THOUSAND/UL (ref 0–0.2)
IMM GRANULOCYTES NFR BLD AUTO: 0 % (ref 0–2)
LYMPHOCYTES # BLD AUTO: 3.13 THOUSANDS/ÂΜL (ref 0.6–4.47)
LYMPHOCYTES NFR BLD AUTO: 34 % (ref 14–44)
MCH RBC QN AUTO: 29.2 PG (ref 26.8–34.3)
MCHC RBC AUTO-ENTMCNC: 33.5 G/DL (ref 31.4–37.4)
MCV RBC AUTO: 87 FL (ref 82–98)
MONOCYTES # BLD AUTO: 0.68 THOUSAND/ÂΜL (ref 0.17–1.22)
MONOCYTES NFR BLD AUTO: 7 % (ref 4–12)
NEUTROPHILS # BLD AUTO: 5.2 THOUSANDS/ÂΜL (ref 1.85–7.62)
NEUTS SEG NFR BLD AUTO: 58 % (ref 43–75)
NRBC BLD AUTO-RTO: 0 /100 WBCS
PLATELET # BLD AUTO: 243 THOUSANDS/UL (ref 149–390)
PMV BLD AUTO: 12.5 FL (ref 8.9–12.7)
RBC # BLD AUTO: 4.8 MILLION/UL (ref 3.81–5.12)
WBC # BLD AUTO: 9.19 THOUSAND/UL (ref 4.31–10.16)

## 2024-07-02 PROCEDURE — 82784 ASSAY IGA/IGD/IGG/IGM EACH: CPT

## 2024-07-02 PROCEDURE — 85025 COMPLETE CBC W/AUTO DIFF WBC: CPT

## 2024-07-02 PROCEDURE — 36415 COLL VENOUS BLD VENIPUNCTURE: CPT

## 2024-07-02 PROCEDURE — 80053 COMPREHEN METABOLIC PANEL: CPT

## 2024-07-03 LAB
ALBUMIN SERPL BCG-MCNC: 3.9 G/DL (ref 3.5–5)
ALP SERPL-CCNC: 117 U/L (ref 34–104)
ALT SERPL W P-5'-P-CCNC: 15 U/L (ref 7–52)
ANION GAP SERPL CALCULATED.3IONS-SCNC: 10 MMOL/L (ref 4–13)
AST SERPL W P-5'-P-CCNC: 18 U/L (ref 13–39)
BILIRUB SERPL-MCNC: 0.37 MG/DL (ref 0.2–1)
BUN SERPL-MCNC: 18 MG/DL (ref 5–25)
CALCIUM SERPL-MCNC: 9 MG/DL (ref 8.4–10.2)
CHLORIDE SERPL-SCNC: 103 MMOL/L (ref 96–108)
CO2 SERPL-SCNC: 24 MMOL/L (ref 21–32)
CREAT SERPL-MCNC: 0.68 MG/DL (ref 0.6–1.3)
GFR SERPL CREATININE-BSD FRML MDRD: 121 ML/MIN/1.73SQ M
GLUCOSE SERPL-MCNC: 82 MG/DL (ref 65–140)
POTASSIUM SERPL-SCNC: 4.1 MMOL/L (ref 3.5–5.3)
PROT SERPL-MCNC: 6.8 G/DL (ref 6.4–8.4)
SODIUM SERPL-SCNC: 137 MMOL/L (ref 135–147)

## 2024-07-11 DIAGNOSIS — K21.9 GASTROESOPHAGEAL REFLUX DISEASE, UNSPECIFIED WHETHER ESOPHAGITIS PRESENT: ICD-10-CM

## 2024-07-12 RX ORDER — OMEPRAZOLE 40 MG/1
40 CAPSULE, DELAYED RELEASE ORAL DAILY
Qty: 90 CAPSULE | Refills: 1 | Status: SHIPPED | OUTPATIENT
Start: 2024-07-12

## 2024-08-06 DIAGNOSIS — J31.0 CHRONIC RHINITIS: Primary | ICD-10-CM

## 2024-08-06 DIAGNOSIS — J45.40 MODERATE PERSISTENT ASTHMA WITHOUT COMPLICATION: ICD-10-CM

## 2024-08-07 RX ORDER — LEVOCETIRIZINE DIHYDROCHLORIDE 5 MG/1
5 TABLET, FILM COATED ORAL
Qty: 90 TABLET | Refills: 1 | Status: SHIPPED | OUTPATIENT
Start: 2024-08-07

## 2024-08-12 ENCOUNTER — TELEPHONE (OUTPATIENT)
Dept: GASTROENTEROLOGY | Facility: CLINIC | Age: 29
End: 2024-08-12

## 2024-08-21 NOTE — TELEPHONE ENCOUNTER
Medication: Humira 40mg pens  Directions:  inject 40mg every week  Quantity: 4 pens  Day Supply: 28  Insurance: [employer]  How Prior Auth was submitted: Edwige  Authorization Date range: 9/9/2024 - 9/9/2025  Authorization Number: [id number]  Pharmacy that fills med: [facility]      Letter in Media

## 2024-08-23 DIAGNOSIS — K52.9 IBD (INFLAMMATORY BOWEL DISEASE): ICD-10-CM

## 2024-08-23 RX ORDER — ADALIMUMAB 40MG/0.4ML
KIT SUBCUTANEOUS
Qty: 4 EACH | Refills: 10 | Status: SHIPPED | OUTPATIENT
Start: 2024-08-23

## 2024-09-06 ENCOUNTER — APPOINTMENT (OUTPATIENT)
Age: 29
End: 2024-09-06

## 2024-09-13 ENCOUNTER — OFFICE VISIT (OUTPATIENT)
Dept: GASTROENTEROLOGY | Facility: CLINIC | Age: 29
End: 2024-09-13
Payer: COMMERCIAL

## 2024-09-13 VITALS
RESPIRATION RATE: 18 BRPM | OXYGEN SATURATION: 97 % | SYSTOLIC BLOOD PRESSURE: 128 MMHG | HEART RATE: 78 BPM | BODY MASS INDEX: 42.11 KG/M2 | HEIGHT: 66 IN | DIASTOLIC BLOOD PRESSURE: 74 MMHG | WEIGHT: 262 LBS | TEMPERATURE: 98.4 F

## 2024-09-13 DIAGNOSIS — Z79.899 IMMUNOSUPPRESSION DUE TO DRUG THERAPY  (HCC): Primary | ICD-10-CM

## 2024-09-13 DIAGNOSIS — D83.9 CVID (COMMON VARIABLE IMMUNODEFICIENCY) (HCC): ICD-10-CM

## 2024-09-13 DIAGNOSIS — D84.821 IMMUNOSUPPRESSION DUE TO DRUG THERAPY  (HCC): Primary | ICD-10-CM

## 2024-09-13 DIAGNOSIS — K52.9 IBD (INFLAMMATORY BOWEL DISEASE): ICD-10-CM

## 2024-09-13 PROCEDURE — 99214 OFFICE O/P EST MOD 30 MIN: CPT | Performed by: INTERNAL MEDICINE

## 2024-09-13 NOTE — PROGRESS NOTES
Gastroenterology Outpatient Follow-up - Ulcerative Colitis  Antony Rodriguez 28 y.o. female MRN: [id number]  Encounter: [id number]    Antony Rodriguez is a 28 y.o. female with Ulcerative colitis.    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  She was a nonresponder to mesalamine and 6-MP and is in clinical and endoscopic remission on weekly adalimumab.    Ulcerative Colitis Summary  Macroscopic extent of diseases: pancolitis  Microscopic extent of diseases:  pancolitis  Has the patient ever been hospitalized for severe disease: No        Surgical History  Number of IBD surgeries: 0      First IBD surgery:    Most Recent IBD surgery:    Esophageal:  0    Gastroduodenal:  0    Small bowel resection(s):  0    Ileocolonic resection(s): 0      Colonic resection(s):  0    Ileostomy or colostomy:  no previous ostomy    Complete colectomy:  No         Medications     Year last used Reason for discontinuation   Corticosteroids prior   2019       Thiopurines prior   2018 FL     Methotrexate   never         Infliximab   never         Adalimumab prior     CR Required dose escalation to weekly.   Certolizumab   never         Golimumab   never         Natalizumab   never         Mesalamine prior     AE Worsening diarrhea.   Sulfasalzine   never         Vedolizumab   never         Ustekinumab   never         Tofacitinib   never         Other biologic   never             Extraintestinal Manifestations    IBD-associated arthropathy No    Uveitis No    Oral aphthous ulcers No   Erythema nodosum No    Pyoderma gangrenosum No    Primary sclerosing cholangitis No    Thrombotic complications No          Cancer / Dysplasia History  History of IBD-associated dysplasia: none    Date of diagnosis (Year):     History of colorectal cancer: No   History of cervical dysplasia: No   History of skin cancer: none         Laboratory Data   Most recent  (date) Result   PPD   unknown   Quanitferon gold 5/16/2023 negative   TPMT 9/22/2018 low   Hepatitis A   unknown   HBsAb 9/22/2020 positive   HBcAb 9/22/2020 negative   HBsAg 9/14/2023 negative   HCV Ab   unknown       Imaging / Diagnostic Procedures   Most recent (date) Findings   Colonoscopy or sigmoidoscopy #1 4/1/2022            Ulcers/Erosions  no erosions           Strictures  none           Stricture Severity  N/A           Endoscopic Score Quiros Score:  0 Rutgeert's:  N/A            Findings  Internal hemorrhoids were found. The hemorrhoids were small.  - There is no visible evidence of active colitis seen.  - The colonoscope was advanced to the cecum and then advanced 5 cm into the terminal ileum which was normal.  - Narrow band imaging was performed throughout the colon.  - Surveillace biopsies were otained throughout the colon.           Pathology  A. Small intestine, duodenum, biopsy:  - Focal chronic duodenitis.  B. Stomach, polyp, polypectomy:  - Fundic gland polyp.  C. Esophagus, gastroesophageal junction, biopsy:  - Cardio-oxyntic mucosa with mild chronic active inflammation, negative for intestinal metaplasia and dysplasia.  D. Colon, cecum, biopsy:  - Colonic mucosa with no specific pathologic change.  E. Colon, ascending, biopsy:  - Colonic mucosa with no specific pathologic change.  F. Colon, proximal transverse, biopsy:  - Colonic mucosa with no specific pathologic change.  G. Colon, distal transverse, biopsy:  - Colonic mucosa with no specific pathologic change.  H. Colon, @ 50 cm, biopsy:  - Colonic mucosa with no specific pathologic change.  I. Colon, @ 40 cm, biopsy:  - Colonic mucosa with no specific pathologic change.  J. Colon, @ 30cm, biopsy:  - Colonic mucosa with no specific pathologic change.  K. Colon, @ 20 cm, biopsy:  - Colonic mucosa with no specific pathologic change.  L. Rectum, biopsy:  - Colonic mucosa with no specific pathologic change.   Colonoscopy or sigmoidoscopy #2               Ulcers/Erosions                 Strictures                 Stricture Severity              Endoscopic Score Quiros Score:    Rugeert's:              Findings              Pathology      EGD 2022 Irregular Z-line, 1 cm hiatal hernia, 3 mm gastric polyp removed.   Small bowel follow-through       CT enterography       CT without enterography       MRI enterography       Capsule study       DEXA scan   Lowest Z-score:      CXR (for TB)           HPI:   Interval History:  2024 Follow up  She is doing well and remains on weekly adalimumab. Since the last visit, she had placenta previa and had emergency  in May. The baby had to stay in the NICU for some time, but now is doing great. No live vaccines given. Antony stayed in adalimumab throughout her pregnancy.  She felt very stressed while the baby was in the NICU and had diarrhea, but now everything is back to baseline constipation. She is using Miralax, which helps. Continuing with IVIG. Planning to get flu and COVID boosters. Last Derm appoint ment was in . She has had pneumococcal vaccines. Last colonoscopy was in  and she was in remission. CBC from 2024 normal with Hgb 14, MCV 87. CMP normal with Cr 0.68 and normal LFTs. TSH normal in .     3/13/2024 Follow up  Antony is doing well.  She is now 6 months pregnant.  She has stayed on adalimumab, weekly.  She feels constipated with 5 BMs per week.  Stools are hard and she is straining.  She was told not to take MiraLAX while pregnant.  Instead, she has been taking Metamucil, gummy bears, gummy bars, and vegetables.  She is drinking 90 fl oz per day.  Some bleeding this past week while straining.  Continues to get IVIG and the dose was increased based on low immunoglobulin levels.  Recent labs from  reviewed.  Mild leukocytosis with WBC 12.7, hemoglobin 11.5, MCV 85, LFTs and kidney function normal.  Free T4 a little low.    2023 Initial visit  Antony Rodriguez is  establishing care with me for ulcerative pan-colitis (initially diagnosed as Crohn's disease).  She also has CVID and receives IVIG.  She was previously seeing Dr. Mcnulty at [facility].    She reports history of chronic constipation for most of her life.  Her first colonoscopy was in 2006 (11 years old) and the mucosa appeared normal but biopsies from the cecum showed nonspecific chronic inflammation.  In 2018, while in college, she developed bloody diarrhea, abdominal pain, and weight loss, and had another colonoscopy which found acute inflammation in the cecum and rectum, and she was diagnosed with Crohn's colitis.  She was initially treated with mesalamine (Lialda) but this caused her diarrhea to worsen.  She then establish care with Dr. John at [facility] and was started on 6-MP with partial response (improvement in pain but ongoing bleeding and calprotectin remained elevated).  In 2019, she started adalimumab after flexible sigmoidoscopy showed ongoing active disease despite 6-MP dose increased to 75 mg.  Prior to adalimumab, she was on a prednisone taper.    Adalimumab was started in May 2019 and was escalated to 40 mg every 7 days and September 2020 based on drug level and ongoing clinical symptoms.  She felt improved with higher dose of adalimumab.  She is also on omeprazole for reflux and upper abdominal symptoms, and has history of treated H. pylori infection. Her last colonoscopy was in April 2022 and was normal.  She feels that she is in clinical remission.  Her last Pap smear was a year ago and she has another appointment scheduled for October.  She has not seen Dermatology.  She is up-to-date with COVID and flu vaccinations.  She has also had pneumococcal vaccines.    Antony reports the following symptoms over the last 7 days:    Stool Frequency normal   Average stools per day: 1   Average liquid stools per day: 0   Consistency of bowel: formed   Awakening from sleep to move bowels? No   Urgency mild  "fecal urgency   Visible blood in stool? visible blood in stool less than half the time   Abdominal pain? mild   General Wellbeing? generally well     Antony also reports the following symptoms in the last month:    Leakage of stool while sleeping? No   Leakage of stool while awake? No       REVIEW OF SYSTEMS:  During the last 7 days, Antony experienced the following symptoms:  Unintentional Weight Loss (in the last month) No   Fever No   Eye irritation No   Mouth sores No   Sore throat No   Chest pain No   Shortness of breath No   Numbness or tingling in hands or feet No   Skin rash No   Pain or swelling in joints No   Bruising or bleeding No   Felt depressed or blue No   Fatigue No   Dysuria No   Please see HPI for additional pertinent review of systems; otherwise remainder of ROS was unremarkable    MEDICATIONS:    Current Outpatient Medications:     albuterol (PROVENTIL HFA,VENTOLIN HFA) 90 mcg/act inhaler    budesonide (Pulmicort Flexhaler) 90 MCG/ACT inhaler    cholecalciferol (VITAMIN D3) 1,000 units tablet    Humira, 2 Pen, 40 MG/0.4ML PNKT    Immune Globulin, Human, (Hizentra) 10 GM/50ML SOLN    levocetirizine (Xyzal Allergy 24HR) 5 MG tablet    omeprazole (PriLOSEC) 40 MG capsule    Prenatal-FeFum-FA-DHA w/o A (PRENATAL + DHA PO)    Synthroid 100 MCG tablet    ALLERGIES:  Allergies   Allergen Reactions    Bee Venom Anaphylaxis     breathing  breathing      Cinnamon - Food Allergy Anaphylaxis     Lab test 0.  Per pt she has tolerated many times by accident, although has not had formal food challenge by allergy    Albuterol Tachycardia       OBJECTIVE:  /74 (BP Location: Right arm, Patient Position: Sitting, Cuff Size: Large)   Pulse 78   Temp 98.4 °F (36.9 °C) (Temporal)   Resp 18   Ht 5' 6\" (1.676 m)   Wt 119 kg (262 lb)   SpO2 97%   BMI 42.29 kg/m²      PHYSICAL EXAM:    General Appearance:   Alert, cooperative, no distress   HEENT:   Normocephalic, atraumatic, anicteric.     Neck:  Supple, " symmetrical, trachea midline   Lungs:   Clear to auscultation bilaterally; no rales, rhonchi or wheezing; respirations unlabored    Heart::   Regular rate and rhythm; no murmur, rub, or gallop.   Abdomen:   Soft, non-distended; normal bowel sounds    Abdominal exam was notable for no tenderness with no mass.     Genitalia:   Deferred    Rectal:   Perirectal assessment was not performed.                     Extremities:  No cyanosis, clubbing or edema    Pulses:  2+ and symmetric    Skin:  No jaundice, rashes, or lesions    Lymph nodes:  No palpable cervical lymphadenopathy        Lab Results   Component Value Date    WBC 9.19 07/02/2024    HGB 14.0 07/02/2024    HCT 41.8 07/02/2024    MCV 87 07/02/2024     07/02/2024     Lab Results   Component Value Date    SODIUM 137 07/02/2024    K 4.1 07/02/2024     07/02/2024    CO2 24 07/02/2024    AGAP 10 07/02/2024    BUN 18 07/02/2024    CREATININE 0.68 07/02/2024    GLUC 82 07/02/2024    GLUF 85 05/04/2024    CALCIUM 9.0 07/02/2024    AST 18 07/02/2024    ALT 15 07/02/2024    ALKPHOS 117 (H) 07/02/2024    TP 6.8 07/02/2024    TBILI 0.37 07/02/2024    EGFR 121 07/02/2024     Lab Results   Component Value Date    CRP 2.9 09/14/2023     Lab Results   Component Value Date    WDAXISSE59 305 10/27/2021     Lab Results   Component Value Date    FERRITIN 17 09/14/2023       ASSESSMENT AND PLAN:    Antony has a history of macroscopic pancolitis and microscopic pancolitis  ulcerative colitis diagnosed in 2018. Current medical therapy is with adalimumab 40 mg every 7 days. My global assessment is that the clinical disease is currently quiescent.     The 6-point Quiros score was 1 and the 9-point Quiros score was 1.  The short CDAI was 79.      Antony has history of loving-ulcerative colitis diagnosed in 2018. She also has CVID and is on IVIG. She is on weekly adalimumab and is in clinical and endoscopic remission. She recently delivered a healthy baby and remained on adalimumab  throughout the pregnancy. She is constipated at baseline.   1. Continue adalimumab 40 mg weekly.  2. OK to breastfeed.  3. No live vaccines for the baby until age 6 months.  4. Continue daily Miralax.  5. COVID and flu vaccines.  6. Referral to Dermatology.  7. Colonoscopy in 2026 for dysplasia screening.  8. Continue IVIG and follow up with Immunology.    Return in 6 months.      Health Maintenance Recommendations:  Vaccines & Infections  COVID-19 vaccination and boosters are recommended. There is no evidence that the COVID-19 vaccine would cause an IBD flare.  Avoid live vaccines if on immunosuppressive therapy.  Yearly influenza vaccine (flu shot).  Pneumonia vaccines for patients on immunosuppression. These include Prevnar 20, followed by Pneumovax 23 at least 8 weeks later.  Shingrix vaccine (series of 2 injections) for al patients 65 and older. Patients on tofacitinib or upadacitinib should be vaccinated regardless of age.  If not immune to measles mumps or rubella, MMR vaccine is recommended. However, this is a live vaccine and should be given prior to immunosuppressive therapy.  HPV vaccination as per national guidelines.  Hepatitis A and B vaccinations if not previously vaccinated.  Testing for tuberculosis with QuantiFERON Gold blood test and/or chest xray prior to starting immunosuppressive medications, and then annually    Cancer screening  Dysplasia surveillance for colorectal cancer. Colonoscopy in all patients with extensive colitis (more than 1/3 of the colon involved) who had disease for at least 8 or more years.  Repeat colonoscopy approximately every 12-24 months.  In patients with concurrent primary sclerosing cholangitis, history of dysplasia, or family history of colon cancer, repeat colonoscopy annually.    Females: Pap smear annually for woman on immunosuppression.  Annual dermatologic/skin exam in all patients with IBD, especially those on immunosuppression with thiopurines or MYRTLE  inhibitors.    Miscellaneous  DEXA scan, once off steroids for 3 months  Depression screening recommended annually  Routine dental and ophthalmology examinations    Problem List Items Addressed This Visit    None  Visit Diagnoses       Immunosuppression due to drug therapy  (HCC)    -  Primary    Relevant Orders    Ambulatory Referral to Dermatology    IBD (inflammatory bowel disease)        Relevant Orders    Ambulatory Referral to Dermatology            Vel Bucio MD

## 2024-10-07 DIAGNOSIS — E06.3 HYPOTHYROIDISM DUE TO HASHIMOTO'S THYROIDITIS: ICD-10-CM

## 2024-10-07 DIAGNOSIS — K21.9 GASTROESOPHAGEAL REFLUX DISEASE, UNSPECIFIED WHETHER ESOPHAGITIS PRESENT: ICD-10-CM

## 2024-10-07 RX ORDER — LEVOTHYROXINE SODIUM 100 MCG
TABLET ORAL
Qty: 90 TABLET | Refills: 1 | Status: SHIPPED | OUTPATIENT
Start: 2024-10-07

## 2024-10-07 RX ORDER — OMEPRAZOLE 40 MG/1
40 CAPSULE, DELAYED RELEASE ORAL DAILY
Qty: 90 CAPSULE | Refills: 1 | Status: SHIPPED | OUTPATIENT
Start: 2024-10-07

## 2024-10-15 ENCOUNTER — NURSE TRIAGE (OUTPATIENT)
Age: 29
End: 2024-10-15

## 2024-10-15 ENCOUNTER — PATIENT MESSAGE (OUTPATIENT)
Dept: OBGYN CLINIC | Facility: CLINIC | Age: 29
End: 2024-10-15

## 2024-10-15 NOTE — PROGRESS NOTES
"Ambulatory Visit  Name: Antony Rodriguez      : 1995      MRN: [id number]  Encounter Provider: Melony Baez DO  Encounter Date: 10/16/2024   Encounter department: [facility]    Assessment & Plan  Healthcare maintenance  BP WNL   Labs UTD   Pap UTD   Vaccinations: TDap UTD, Flu given, COVID defers for another time   Encouraged regular physical activity, varied diet, and regular dental/eye exams          Needs flu shot    Orders:    Fluzone 0.5 mL IM       History of Present Illness     HPI    Pt presents for annual physical     Continues to follow with GI for Crohn's -- did have flare ups post-partum (her son was in the NICU, so she was under a great deal of stress)   Also following with Allergy for CVID and Endo for hypothyroidism       Review of Systems   Constitutional:  Positive for fever (two nights ago -- thinks she has mastitis, going to see Gyn after this visit). Negative for unexpected weight change.   HENT:  Negative for congestion, ear pain, rhinorrhea and sore throat.    Eyes:  Negative for visual disturbance.   Respiratory:  Negative for cough and shortness of breath.    Cardiovascular:  Negative for chest pain, palpitations and leg swelling.   Gastrointestinal:  Positive for blood in stool (occasional) and constipation (post-partum, has increased fiber in diet, which is helping). Negative for abdominal pain and diarrhea.   Endocrine: Negative for polyuria.   Genitourinary:  Negative for dysuria, hematuria and menstrual problem (hasn't restarted menses since delivery).   Neurological:  Positive for light-headedness (intermittently at work, trying to stay hydrated/eat snacks while pumping). Negative for dizziness and headaches.   Psychiatric/Behavioral:  Negative for sleep disturbance.      Medical History Reviewed by provider this encounter:           Objective     /80   Pulse 78   Temp (!) 96 °F (35.6 °C)   Ht 5' 6\" (1.676 m)   Wt 120 kg (264 lb)   SpO2 99%  "  BMI 42.61 kg/m²     Physical Exam  Vitals and nursing note reviewed.   Constitutional:       General: She is not in acute distress.     Appearance: She is well-developed.   HENT:      Head: Normocephalic and atraumatic.      Right Ear: Tympanic membrane, ear canal and external ear normal.      Left Ear: Tympanic membrane, ear canal and external ear normal.      Nose: Nose normal. No rhinorrhea.      Mouth/Throat:      Mouth: Mucous membranes are moist.      Pharynx: No oropharyngeal exudate or posterior oropharyngeal erythema.   Eyes:      Conjunctiva/sclera: Conjunctivae normal.   Cardiovascular:      Rate and Rhythm: Normal rate and regular rhythm.   Pulmonary:      Effort: Pulmonary effort is normal. No respiratory distress.      Breath sounds: Normal breath sounds.   Abdominal:      General: Bowel sounds are normal. There is no distension.      Palpations: Abdomen is soft.      Tenderness: There is no abdominal tenderness.   Musculoskeletal:      Right lower leg: No edema.      Left lower leg: No edema.   Lymphadenopathy:      Cervical: No cervical adenopathy.   Skin:     General: Skin is warm and dry.   Neurological:      Mental Status: She is alert.      Comments: Grossly intact   Psychiatric:         Mood and Affect: Mood normal.

## 2024-10-15 NOTE — TELEPHONE ENCOUNTER
Patient returning call. Notes left lateral breast pain that began yesterday. Denies any redness/streaking, swelling, or lumps. Breastfeeding/pumping. No change in supply. Notes some body aches and chills last evening but unsure of temp. Appointment schedule for  tomorrow.

## 2024-10-15 NOTE — TELEPHONE ENCOUNTER
"Reason for Disposition  • Patient wants to be seen    Answer Assessment - Initial Assessment Questions  1. SYMPTOM: \"What's the main symptom you're concerned about?\"  (e.g., lump, pain, rash, nipple discharge)      Left breast pain  2. LOCATION: \"Where is the s/s located?\"      Lateral left breast/axilla  3. ONSET: \"When did s/s  start?\"      yesterday  4. PRIOR HISTORY: \"Do you have any history of prior problems with your breasts?\" (e.g., lumps, cancer, fibrocystic breast disease)      Denies  5. CAUSE: \"What do you think is causing this symptom?\"      unsure  6. OTHER SYMPTOMS: \"Do you have any other symptoms?\" (e.g., fever, breast pain, redness or rash, nipple discharge)      Body aches and chills last evening - unknow temp  7. PREGNANCY-BREASTFEEDING: \"Is there any chance you are pregnant?\" \"When was your last menstrual period?\" \"Are you breastfeeding?\"      Post partum    Protocols used: Breast Symptoms-ADULT-[place]    "

## 2024-10-15 NOTE — TELEPHONE ENCOUNTER
"Regarding: PP left breast pain  ----- Message from Ele CHIRINOS sent at 10/15/2024  8:13 AM EDT -----  Patient wrote in with left breast pain \"pain in my left breast for the past 24 hours. When touching my left breast on the outside/under the armpit it's very sensitive. It doesn't have any warmth to it. From what I felt there is no lumps. I have been pumping regularly. I was wondering what can I do for this? I took Tylenol last night which didn't seem to help. I put a warm compress on it and massaged it which helped temporarily but after waking up this morning it came back. \"    "

## 2024-10-16 ENCOUNTER — TELEPHONE (OUTPATIENT)
Age: 29
End: 2024-10-16

## 2024-10-16 ENCOUNTER — OFFICE VISIT (OUTPATIENT)
Dept: OBGYN CLINIC | Facility: CLINIC | Age: 29
End: 2024-10-16
Payer: COMMERCIAL

## 2024-10-16 ENCOUNTER — OFFICE VISIT (OUTPATIENT)
Dept: FAMILY MEDICINE CLINIC | Facility: CLINIC | Age: 29
End: 2024-10-16
Payer: COMMERCIAL

## 2024-10-16 VITALS
HEIGHT: 66 IN | OXYGEN SATURATION: 99 % | SYSTOLIC BLOOD PRESSURE: 110 MMHG | BODY MASS INDEX: 42.43 KG/M2 | HEART RATE: 78 BPM | TEMPERATURE: 96 F | WEIGHT: 264 LBS | DIASTOLIC BLOOD PRESSURE: 80 MMHG

## 2024-10-16 VITALS — SYSTOLIC BLOOD PRESSURE: 124 MMHG | DIASTOLIC BLOOD PRESSURE: 72 MMHG | WEIGHT: 264.4 LBS | BODY MASS INDEX: 42.68 KG/M2

## 2024-10-16 DIAGNOSIS — N61.0 MASTITIS: Primary | ICD-10-CM

## 2024-10-16 DIAGNOSIS — Z23 NEEDS FLU SHOT: ICD-10-CM

## 2024-10-16 DIAGNOSIS — Z00.00 HEALTHCARE MAINTENANCE: Primary | ICD-10-CM

## 2024-10-16 PROCEDURE — 99213 OFFICE O/P EST LOW 20 MIN: CPT

## 2024-10-16 PROCEDURE — 99395 PREV VISIT EST AGE 18-39: CPT | Performed by: FAMILY MEDICINE

## 2024-10-16 PROCEDURE — 90471 IMMUNIZATION ADMIN: CPT | Performed by: FAMILY MEDICINE

## 2024-10-16 PROCEDURE — 90656 IIV3 VACC NO PRSV 0.5 ML IM: CPT | Performed by: FAMILY MEDICINE

## 2024-10-16 RX ORDER — CEFUROXIME AXETIL 500 MG/1
500 TABLET ORAL EVERY 12 HOURS SCHEDULED
Qty: 20 TABLET | Refills: 0 | Status: SHIPPED | OUTPATIENT
Start: 2024-10-16 | End: 2024-10-26

## 2024-10-16 RX ORDER — CEFUROXIME AXETIL 500 MG/1
500 TABLET ORAL EVERY 12 HOURS SCHEDULED
Qty: 20 TABLET | Refills: 0 | Status: SHIPPED | OUTPATIENT
Start: 2024-10-16 | End: 2024-10-16

## 2024-10-16 NOTE — PROGRESS NOTES
Assessment/Plan:   - Begin Ceftin 500mg BID x 10 days. Encouraged patient to continue breastfeeding, monitoring the area of pain. She was encouraged to use warm compresses of the area and massage the area as able. We discussed Tylenol use for pain/fevers.      Diagnoses and all orders for this visit:    Mastitis  -     Discontinue: cefuroxime (CEFTIN) 500 mg tablet; Take 1 tablet (500 mg total) by mouth every 12 (twelve) hours for 10 days  -     cefuroxime (CEFTIN) 500 mg tablet; Take 1 tablet (500 mg total) by mouth every 12 (twelve) hours for 10 days        Subjective:     Patient ID: Antony Rodriguez is a 29 y.o. female.    Patient is a 30 y/o  F presenting to the office with concern for left lateral breast pain. She notes that this began 2 days ago while she was at work. She took Tylenol which did not help with the pain. She did feel feverish with whole body chills, but she did not take her temperature at the time. She has continued to breast feed and pump over the past 2 days. She denies rash of the area of pain.     1LTCS 2024.         Review of Systems   Constitutional:  Positive for chills (she did not take temperature, unsure if she had a fever).   Respiratory:  Negative for shortness of breath.    Cardiovascular:  Negative for chest pain.   Gastrointestinal:  Negative for abdominal pain, constipation, diarrhea, nausea and vomiting.   Genitourinary:         Left breast pain   Skin:  Negative for rash and wound.         Objective:     Physical Exam  Vitals and nursing note reviewed.   Constitutional:       Appearance: Normal appearance.   HENT:      Head: Normocephalic and atraumatic.   Chest:   Breasts:     Right: Normal.      Left: Skin change (diffuse erythema over lateral and inferior borders of breast, warm and tender to touch. No abscesses, masses present.) and tenderness present. No mass.       Neurological:      General: No focal deficit present.      Mental Status: She is alert and  oriented to person, place, and time.   Psychiatric:         Mood and Affect: Mood normal.         Behavior: Behavior normal.             I have spent a total time of 20 minutes in caring for this patient on the day of the visit/encounter including Risks and benefits of tx options, Instructions for management, Patient and family education, Documenting in the medical record, Reviewing / ordering tests, medicine, procedures  , and Obtaining or reviewing history  .

## 2024-10-16 NOTE — TELEPHONE ENCOUNTER
Patient called at [facility] now and was told no script in system.   Patient made aware that order for Ceftin was E prescribed by provider and receipt confirmed at 16:03.   Patient is in line now and will notify Pharmacist.

## 2024-11-05 DIAGNOSIS — J31.0 CHRONIC RHINITIS: ICD-10-CM

## 2024-11-05 DIAGNOSIS — J45.40 MODERATE PERSISTENT ASTHMA WITHOUT COMPLICATION: ICD-10-CM

## 2024-11-05 RX ORDER — LEVOCETIRIZINE DIHYDROCHLORIDE 5 MG/1
5 TABLET, FILM COATED ORAL
Qty: 90 TABLET | Refills: 1 | Status: SHIPPED | OUTPATIENT
Start: 2024-11-05

## 2024-12-18 ENCOUNTER — APPOINTMENT (OUTPATIENT)
Age: 29
End: 2024-12-18
Payer: COMMERCIAL

## 2024-12-18 DIAGNOSIS — E06.3 HYPOTHYROIDISM DUE TO HASHIMOTO'S THYROIDITIS: ICD-10-CM

## 2024-12-18 DIAGNOSIS — D83.9 CVID (COMMON VARIABLE IMMUNODEFICIENCY) (HCC): ICD-10-CM

## 2024-12-18 LAB
ALBUMIN SERPL BCG-MCNC: 4.3 G/DL (ref 3.5–5)
ALP SERPL-CCNC: 116 U/L (ref 34–104)
ALT SERPL W P-5'-P-CCNC: 21 U/L (ref 7–52)
ANION GAP SERPL CALCULATED.3IONS-SCNC: 9 MMOL/L (ref 4–13)
AST SERPL W P-5'-P-CCNC: 24 U/L (ref 13–39)
BASOPHILS # BLD AUTO: 0.03 THOUSANDS/ÂΜL (ref 0–0.1)
BASOPHILS NFR BLD AUTO: 0 % (ref 0–1)
BILIRUB SERPL-MCNC: 0.62 MG/DL (ref 0.2–1)
BUN SERPL-MCNC: 20 MG/DL (ref 5–25)
CALCIUM SERPL-MCNC: 9.4 MG/DL (ref 8.4–10.2)
CHLORIDE SERPL-SCNC: 104 MMOL/L (ref 96–108)
CO2 SERPL-SCNC: 25 MMOL/L (ref 21–32)
CREAT SERPL-MCNC: 0.58 MG/DL (ref 0.6–1.3)
EOSINOPHIL # BLD AUTO: 0.05 THOUSAND/ÂΜL (ref 0–0.61)
EOSINOPHIL NFR BLD AUTO: 1 % (ref 0–6)
ERYTHROCYTE [DISTWIDTH] IN BLOOD BY AUTOMATED COUNT: 12.7 % (ref 11.6–15.1)
GFR SERPL CREATININE-BSD FRML MDRD: 126 ML/MIN/1.73SQ M
GLUCOSE SERPL-MCNC: 94 MG/DL (ref 65–140)
HCT VFR BLD AUTO: 42.7 % (ref 34.8–46.1)
HGB BLD-MCNC: 14.2 G/DL (ref 11.5–15.4)
IGG SERPL-MCNC: 1267 MG/DL (ref 635–1741)
IMM GRANULOCYTES # BLD AUTO: 0.02 THOUSAND/UL (ref 0–0.2)
IMM GRANULOCYTES NFR BLD AUTO: 0 % (ref 0–2)
LYMPHOCYTES # BLD AUTO: 2.71 THOUSANDS/ÂΜL (ref 0.6–4.47)
LYMPHOCYTES NFR BLD AUTO: 29 % (ref 14–44)
MCH RBC QN AUTO: 28.6 PG (ref 26.8–34.3)
MCHC RBC AUTO-ENTMCNC: 33.3 G/DL (ref 31.4–37.4)
MCV RBC AUTO: 86 FL (ref 82–98)
MONOCYTES # BLD AUTO: 0.81 THOUSAND/ÂΜL (ref 0.17–1.22)
MONOCYTES NFR BLD AUTO: 9 % (ref 4–12)
NEUTROPHILS # BLD AUTO: 5.85 THOUSANDS/ÂΜL (ref 1.85–7.62)
NEUTS SEG NFR BLD AUTO: 61 % (ref 43–75)
NRBC BLD AUTO-RTO: 0 /100 WBCS
PLATELET # BLD AUTO: 259 THOUSANDS/UL (ref 149–390)
PMV BLD AUTO: 11.8 FL (ref 8.9–12.7)
POTASSIUM SERPL-SCNC: 4.3 MMOL/L (ref 3.5–5.3)
PROT SERPL-MCNC: 7.2 G/DL (ref 6.4–8.4)
RBC # BLD AUTO: 4.96 MILLION/UL (ref 3.81–5.12)
SODIUM SERPL-SCNC: 138 MMOL/L (ref 135–147)
WBC # BLD AUTO: 9.47 THOUSAND/UL (ref 4.31–10.16)

## 2024-12-18 PROCEDURE — 80053 COMPREHEN METABOLIC PANEL: CPT

## 2024-12-18 PROCEDURE — 85025 COMPLETE CBC W/AUTO DIFF WBC: CPT

## 2024-12-18 PROCEDURE — 36415 COLL VENOUS BLD VENIPUNCTURE: CPT

## 2024-12-18 PROCEDURE — 82784 ASSAY IGA/IGD/IGG/IGM EACH: CPT

## 2024-12-19 ENCOUNTER — RESULTS FOLLOW-UP (OUTPATIENT)
Dept: ENDOCRINOLOGY | Facility: HOSPITAL | Age: 29
End: 2024-12-19

## 2024-12-20 ENCOUNTER — OFFICE VISIT (OUTPATIENT)
Dept: ENDOCRINOLOGY | Facility: HOSPITAL | Age: 29
End: 2024-12-20
Payer: COMMERCIAL

## 2024-12-20 VITALS
WEIGHT: 269.6 LBS | OXYGEN SATURATION: 97 % | HEART RATE: 78 BPM | BODY MASS INDEX: 43.33 KG/M2 | SYSTOLIC BLOOD PRESSURE: 124 MMHG | DIASTOLIC BLOOD PRESSURE: 78 MMHG | HEIGHT: 66 IN

## 2024-12-20 DIAGNOSIS — E55.9 VITAMIN D DEFICIENCY: ICD-10-CM

## 2024-12-20 DIAGNOSIS — R74.8 ELEVATED ALKALINE PHOSPHATASE LEVEL: Primary | ICD-10-CM

## 2024-12-20 DIAGNOSIS — E06.3 HYPOTHYROIDISM DUE TO HASHIMOTO'S THYROIDITIS: Primary | ICD-10-CM

## 2024-12-20 LAB — BACTERIA UR CULT: NORMAL

## 2024-12-20 PROCEDURE — 99213 OFFICE O/P EST LOW 20 MIN: CPT | Performed by: PHYSICIAN ASSISTANT

## 2024-12-20 RX ORDER — LEVOTHYROXINE SODIUM 100 MCG
100 TABLET ORAL DAILY
Qty: 90 TABLET | Refills: 3 | Status: SHIPPED | OUTPATIENT
Start: 2024-12-20

## 2024-12-20 NOTE — PATIENT INSTRUCTIONS
Continue Synthroid 100 mcg daily.    Contact the office if there is any change in symptoms.     Follow up in 6 months.

## 2024-12-20 NOTE — PROGRESS NOTES
Antony Rodriguez 29 y.o. female MRN: [id number]    Encounter: [id number]      Assessment & Plan     Assessment:  This is a 29 y.o.-year-old female with hypothyroidism due to Hashimoto's thyroiditis and history of hyperparathyroidism.    Plan:  1.  Hypothyroidism due to Hashimoto's thyroiditis: Most recent thyroid lab work came back normal.  Clinically and biochemically euthyroid.  At this time she will continue with Synthroid 100 mcg daily.  If there is any change in symptoms she will contact the office.  Will have her follow-up in 6 months with labwork completed prior to visit.  If everything is normal at next office visit we will likely extend out office visits to once a year.     2.  Hypocalcemia: Calcium levels were in normal range.  We will continue to monitor over time.    3.  Elevated alkaline phosphatase: PCP is already looking into possible liver causes.  At this time I know she has a history of low vitamin D and we will recheck vitamin D as this could be another probable cause.    CC: Hypothyroidism and history of hyperparathyroidism follow-up    History of Present Illness     HPI:  Antony Rodriguez is a 28 year old female with hypothyroidism due to Hashimoto's thyroiditis, common variable immunodeficiency, vitamin-D deficiency, colitis presents for follow-up.  She was diagnosed with hypothyroidism about 8 years ago.  She was initially on levothyroxine, but was switched to Synthroid 100 mcg daily at her visit in 2021 as her thyroid function was normal, but continued to have fatigue symptoms.   She takes medication appropriately.  Does have a history of endometriosis.  Is currently in school to become a PTA.   Overall she is feeling okay.  Having some fatigue at this time but this is likely due to having a  and sleep schedule still being off.  No significant neck compressive symptoms but occasionally notes some discomfort.  Denies any heat or cold intolerance, chest pain, palpitations, abdominal  pain, constipation, tremors, anxiety or depression, hair loss, dry skin, insomnia. Has some diarrhea due to her UC.  There has been concerns with hyperparathyroidism in the past, but of note she is also had mild hypocalcemia from time to time.  Recently she has had a slightly elevated alkaline phosphatase level.  Is currently taking a vitamin D supplement.  No concerns or questions at this time.     Review of Systems   Constitutional:  Negative for activity change, appetite change, diaphoresis, fatigue and unexpected weight change.   HENT:  Negative for sore throat, trouble swallowing and voice change.    Eyes:  Negative for visual disturbance.   Respiratory:  Negative for chest tightness and shortness of breath.    Cardiovascular:  Negative for chest pain, palpitations and leg swelling.   Gastrointestinal:  Positive for diarrhea. Negative for abdominal pain and constipation.   Endocrine: Negative for cold intolerance, heat intolerance, polydipsia, polyphagia and polyuria.   Skin:  Negative for rash.   Neurological:  Negative for dizziness, tremors, light-headedness, numbness and headaches.   Hematological:  Negative for adenopathy.   Psychiatric/Behavioral:  Negative for dysphoric mood and sleep disturbance. The patient is not nervous/anxious.        Historical Information   Past Medical History:   Diagnosis Date   • Anemia    • Anxiety    • Asthma    • Colitis, chronic, ulcerative (HCC)    • Disease of thyroid gland    • Endometriosis    • GERD (gastroesophageal reflux disease)    • Gestational hypertension 2024   • Hashimoto's disease    • Heartburn    • Hypothyroidism    • Immune deficiency disorder (HCC)    • Irregular menses 2022   • Migraine    • Palpitations 2022   • Pneumonia    • Rash    • Status post primary low transverse  section 2024   • Ulcerative colitis (HCC)     crohn's disease, takes Humara weekly   • Urinary tract infection    • Urticaria    • Varicella     disease  as child and vaccinated     Past Surgical History:   Procedure Laterality Date   • ADENOIDECTOMY     • COLONOSCOPY     • COLPOSCOPY     • EAR SURGERY Bilateral     Tubes   • MEDIPORT INSERTION, SINGLE      x2   • MOLE REMOVAL     • FL  DELIVERY ONLY N/A 2024    Procedure:  SECTION ();  Surgeon: Diana Khalil DO;  Location: [facility] ;  Service: Obstetrics     Social History   Social History     Substance and Sexual Activity   Alcohol Use Yes    Comment: Social drink.     Social History     Substance and Sexual Activity   Drug Use Never     Social History     Tobacco Use   Smoking Status Never   • Passive exposure: Never   Smokeless Tobacco Never     Family History:   Family History   Problem Relation Age of Onset   • Diabetes Mother    • Migraines Mother    • DEDE disease Mother    • Asthma Mother    • Allergies Mother    • Diabetes type II Mother    • Infertility Mother         And father   • Bipolar disorder Father    • Anxiety disorder Father    • Hypertension Father    • Allergies Father    • Hypertension Maternal Grandmother        Meds/Allergies   Current Outpatient Medications   Medication Sig Dispense Refill   • albuterol (PROVENTIL HFA,VENTOLIN HFA) 90 mcg/act inhaler Inhale 2 puffs every 4 (four) hours as needed for wheezing 18 g 3   • budesonide (Pulmicort Flexhaler) 90 MCG/ACT inhaler Inhale 1 puff 2 (two) times a day as needed (wheezing) Rinse mouth after use. 1 each 3   • cholecalciferol (VITAMIN D3) 1,000 units tablet Take 1,000 Units by mouth daily     • Humira, 2 Pen, 40 MG/0.4ML PNKT Inject 1 pen (40 mg) under the skin once a week (every 7 days). 4 each 10   • Immune Globulin, Human, (Hizentra) 10 GM/50ML SOLN Inject 11 g under the skin once a week 220 mL 11   • levocetirizine (Xyzal Allergy 24HR) 5 MG tablet Take 1 tablet (5 mg total) by mouth daily at bedtime 90 tablet 1   • omeprazole (PriLOSEC) 40 MG capsule Take 1 capsule (40 mg total) by mouth daily 90 capsule  "1   • Prenatal-FeFum-FA-DHA w/o A (PRENATAL + DHA PO) Take by mouth in the morning     • Synthroid 100 MCG tablet Take 1 tablet (100 mcg total) by mouth daily 90 tablet 3     No current facility-administered medications for this visit.     Allergies   Allergen Reactions   • Bee Venom Anaphylaxis     breathing  breathing     • Cinnamon - Food Allergy Anaphylaxis     Lab test 0.  Per pt she has tolerated many times by accident, although has not had formal food challenge by allergy   • Albuterol Tachycardia       Objective   Vitals: Blood pressure 124/78, pulse 78, height 5' 6\" (1.676 m), weight 122 kg (269 lb 9.6 oz), SpO2 97%, currently breastfeeding.    Physical Exam  Vitals and nursing note reviewed.   Constitutional:       General: She is not in acute distress.     Appearance: Normal appearance. She is not diaphoretic.   HENT:      Head: Normocephalic and atraumatic.   Eyes:      General: No scleral icterus.     Extraocular Movements: Extraocular movements intact.      Conjunctiva/sclera: Conjunctivae normal.      Pupils: Pupils are equal, round, and reactive to light.   Neck:      Thyroid: No thyroid mass, thyromegaly or thyroid tenderness.   Cardiovascular:      Rate and Rhythm: Normal rate and regular rhythm.      Heart sounds: No murmur heard.  Pulmonary:      Effort: Pulmonary effort is normal. No respiratory distress.      Breath sounds: Normal breath sounds. No wheezing.   Musculoskeletal:      Cervical back: Normal range of motion.      Right lower leg: No edema.      Left lower leg: No edema.   Lymphadenopathy:      Cervical: No cervical adenopathy.   Neurological:      Mental Status: She is alert and oriented to person, place, and time. Mental status is at baseline.      Sensory: No sensory deficit.      Gait: Gait normal.   Psychiatric:         Mood and Affect: Mood normal.         Behavior: Behavior normal.         Thought Content: Thought content normal.         The history was obtained from the " "review of the chart, patient.    Lab Results:   Lab Results   Component Value Date/Time    TSH 3RD GENERATON 2.482 12/18/2024 11:55 AM    TSH 3RD GENERATON 0.587 06/17/2024 09:08 AM    TSH 3RD GENERATON 0.957 04/24/2024 01:02 PM    Free T4 0.91 12/18/2024 11:55 AM    Free T4 0.98 06/17/2024 09:08 AM    Free T4 0.58 (L) 04/24/2024 01:02 PM       Imaging Studies:       Results Review Statement: No pertinent imaging studies reviewed.    Portions of the record may have been created with voice recognition software. Occasional wrong word or \"sound a like\" substitutions may have occurred due to the inherent limitations of voice recognition software. Read the chart carefully and recognize, using context, where substitutions have occurred.    "

## 2025-01-03 ENCOUNTER — APPOINTMENT (OUTPATIENT)
Age: 30
End: 2025-01-03
Payer: COMMERCIAL

## 2025-01-03 DIAGNOSIS — E55.9 VITAMIN D DEFICIENCY: ICD-10-CM

## 2025-01-03 DIAGNOSIS — R74.8 ELEVATED ALKALINE PHOSPHATASE LEVEL: ICD-10-CM

## 2025-01-03 LAB — 25(OH)D3 SERPL-MCNC: 24 NG/ML (ref 30–100)

## 2025-01-03 PROCEDURE — 82306 VITAMIN D 25 HYDROXY: CPT

## 2025-01-03 PROCEDURE — 36415 COLL VENOUS BLD VENIPUNCTURE: CPT

## 2025-01-03 PROCEDURE — 82977 ASSAY OF GGT: CPT

## 2025-01-04 LAB — GGT SERPL-CCNC: 16 U/L (ref 9–64)

## 2025-01-05 ENCOUNTER — RESULTS FOLLOW-UP (OUTPATIENT)
Dept: FAMILY MEDICINE CLINIC | Facility: CLINIC | Age: 30
End: 2025-01-05

## 2025-01-06 DIAGNOSIS — K21.9 GASTROESOPHAGEAL REFLUX DISEASE, UNSPECIFIED WHETHER ESOPHAGITIS PRESENT: ICD-10-CM

## 2025-01-08 ENCOUNTER — RESULTS FOLLOW-UP (OUTPATIENT)
Dept: ENDOCRINOLOGY | Facility: HOSPITAL | Age: 30
End: 2025-01-08

## 2025-01-08 DIAGNOSIS — E55.9 VITAMIN D DEFICIENCY: Primary | ICD-10-CM

## 2025-01-08 RX ORDER — OMEPRAZOLE 40 MG/1
40 CAPSULE, DELAYED RELEASE ORAL DAILY
Qty: 90 CAPSULE | Refills: 1 | Status: SHIPPED | OUTPATIENT
Start: 2025-01-08

## 2025-01-24 DIAGNOSIS — J45.40 MODERATE PERSISTENT ASTHMA WITHOUT COMPLICATION: ICD-10-CM

## 2025-01-24 DIAGNOSIS — E06.3 HYPOTHYROIDISM DUE TO HASHIMOTO'S THYROIDITIS: ICD-10-CM

## 2025-01-24 DIAGNOSIS — J31.0 CHRONIC RHINITIS: ICD-10-CM

## 2025-01-24 RX ORDER — LEVOCETIRIZINE DIHYDROCHLORIDE 5 MG/1
5 TABLET, FILM COATED ORAL
Qty: 90 TABLET | Refills: 0 | Status: SHIPPED | OUTPATIENT
Start: 2025-01-24

## 2025-01-27 RX ORDER — LEVOTHYROXINE SODIUM 100 MCG
100 TABLET ORAL DAILY
Qty: 90 TABLET | Refills: 1 | Status: SHIPPED | OUTPATIENT
Start: 2025-01-27

## 2025-02-04 ENCOUNTER — TELEPHONE (OUTPATIENT)
Age: 30
End: 2025-02-04

## 2025-02-04 ENCOUNTER — OFFICE VISIT (OUTPATIENT)
Dept: FAMILY MEDICINE CLINIC | Facility: CLINIC | Age: 30
End: 2025-02-04
Payer: COMMERCIAL

## 2025-02-04 VITALS
HEIGHT: 66 IN | WEIGHT: 269 LBS | DIASTOLIC BLOOD PRESSURE: 88 MMHG | OXYGEN SATURATION: 98 % | TEMPERATURE: 97.2 F | HEART RATE: 82 BPM | SYSTOLIC BLOOD PRESSURE: 120 MMHG | BODY MASS INDEX: 43.23 KG/M2

## 2025-02-04 DIAGNOSIS — R09.81 SINUS CONGESTION: Primary | ICD-10-CM

## 2025-02-04 LAB
SARS-COV-2 AG UPPER RESP QL IA: NEGATIVE
VALID CONTROL: NORMAL

## 2025-02-04 PROCEDURE — 99213 OFFICE O/P EST LOW 20 MIN: CPT | Performed by: FAMILY MEDICINE

## 2025-02-04 PROCEDURE — 87811 SARS-COV-2 COVID19 W/OPTIC: CPT | Performed by: FAMILY MEDICINE

## 2025-02-04 NOTE — PROGRESS NOTES
"Name: Antony Rodriguez      : 1995      MRN: [id number]  Encounter Provider: Melony Baez DO  Encounter Date: 2025   Encounter department: [facility]  :  Assessment & Plan  Sinus congestion  Rapid COVID (-). No evidence of bacterial focus on exam. Symptoms likely viral in nature. Encouraged supportive care with fluids, rest, nasal saline/Flonase, Xyzal. To call if symptoms persist/worsen.   Orders:  •  POCT Rapid Covid Ag           History of Present Illness   HPI    Pt presents due to acute illness, onset yesterday   R ear pain, post-nasal drip, sore throat, headache  Taking Xyzal regularly   Her son is sick as well -- has an ear infection       Review of Systems   Constitutional:  Negative for fever.   HENT:  Positive for ear pain (R), postnasal drip and sore throat. Negative for congestion and rhinorrhea.    Respiratory:  Negative for cough, chest tightness and shortness of breath.    Gastrointestinal:  Negative for abdominal pain, diarrhea and vomiting.   Musculoskeletal:  Negative for myalgias.   Neurological:  Positive for headaches.       Objective   /88   Pulse 82   Temp (!) 97.2 °F (36.2 °C)   Ht 5' 6\" (1.676 m)   Wt 122 kg (269 lb)   SpO2 98%   BMI 43.42 kg/m²      Physical Exam  Vitals and nursing note reviewed.   Constitutional:       General: She is not in acute distress.     Appearance: She is well-developed.   HENT:      Head: Normocephalic and atraumatic.      Right Ear: Tympanic membrane, ear canal and external ear normal. Tympanic membrane is not erythematous, retracted or bulging.      Left Ear: Tympanic membrane, ear canal and external ear normal. Tympanic membrane is not erythematous, retracted or bulging.      Nose: Nose normal. No rhinorrhea.      Right Sinus: No maxillary sinus tenderness or frontal sinus tenderness.      Left Sinus: No maxillary sinus tenderness or frontal sinus tenderness.      Mouth/Throat:      Mouth: Mucous membranes are " moist.      Pharynx: No oropharyngeal exudate or posterior oropharyngeal erythema.   Eyes:      Conjunctiva/sclera: Conjunctivae normal.   Cardiovascular:      Rate and Rhythm: Normal rate and regular rhythm.   Pulmonary:      Effort: Pulmonary effort is normal. No respiratory distress.      Breath sounds: Normal breath sounds. No wheezing, rhonchi or rales.   Lymphadenopathy:      Cervical: No cervical adenopathy.   Skin:     General: Skin is warm and dry.   Neurological:      Mental Status: She is alert.      Comments: Grossly intact   Psychiatric:         Mood and Affect: Mood normal.

## 2025-02-04 NOTE — TELEPHONE ENCOUNTER
Spoke with patient, she says she will call back she was in a appointment at the moment. Patient is due for a follow up visit with dr johnson as right now he doesn't have anything available in the [place] office. Please if patient is okay she can see al bone if she doesn't want to drive offer virtual that would be okay. Sent patient letter just in case.

## 2025-02-11 DIAGNOSIS — Z00.6 ENCOUNTER FOR EXAMINATION FOR NORMAL COMPARISON OR CONTROL IN CLINICAL RESEARCH PROGRAM: ICD-10-CM

## 2025-02-12 DIAGNOSIS — J01.90 ACUTE NON-RECURRENT SINUSITIS, UNSPECIFIED LOCATION: ICD-10-CM

## 2025-02-12 DIAGNOSIS — J01.90 ACUTE NON-RECURRENT SINUSITIS, UNSPECIFIED LOCATION: Primary | ICD-10-CM

## 2025-02-12 RX ORDER — AMOXICILLIN 875 MG/1
875 TABLET, COATED ORAL 2 TIMES DAILY
Qty: 14 TABLET | Refills: 0 | Status: SHIPPED | OUTPATIENT
Start: 2025-02-12 | End: 2025-02-19

## 2025-02-12 RX ORDER — AMOXICILLIN 875 MG/1
875 TABLET, COATED ORAL 2 TIMES DAILY
Qty: 14 TABLET | Refills: 0 | Status: SHIPPED | OUTPATIENT
Start: 2025-02-12 | End: 2025-02-12 | Stop reason: SDUPTHER

## 2025-02-18 ENCOUNTER — OFFICE VISIT (OUTPATIENT)
Dept: FAMILY MEDICINE CLINIC | Facility: CLINIC | Age: 30
End: 2025-02-18
Payer: COMMERCIAL

## 2025-02-18 VITALS
BODY MASS INDEX: 42.94 KG/M2 | DIASTOLIC BLOOD PRESSURE: 84 MMHG | TEMPERATURE: 97 F | WEIGHT: 267.2 LBS | SYSTOLIC BLOOD PRESSURE: 126 MMHG | HEIGHT: 66 IN | HEART RATE: 90 BPM | OXYGEN SATURATION: 98 %

## 2025-02-18 DIAGNOSIS — R09.81 SINUS CONGESTION: Primary | ICD-10-CM

## 2025-02-18 PROCEDURE — 99213 OFFICE O/P EST LOW 20 MIN: CPT | Performed by: FAMILY MEDICINE

## 2025-02-18 NOTE — PROGRESS NOTES
"Name: Antony Rodriguez      : 1995      MRN: [id number]  Encounter Provider: Melony Baez DO  Encounter Date: 2025   Encounter department: [facility]  :  Assessment & Plan  Sinus congestion  Pt notes some improvement in symptoms, though still having sinus congestion despite antibiotic/OTC management. Discussed options for management including prednisone burst, trial of additional antibiotic, or more time with OTC. Pt hesitant to take Prednisone and she doesn't feel well with it. Given pt just finished an antibiotic, we would like to hold off on using another if possible given her breastfeeding status. Ultimately, pt agreeable to continuing nasal saline rinses, Flonase, Xyzal and to follow up if symptoms persist/worsen.               History of Present Illness   HPI    Pt presents due to ongoing sinus congestion      -- 1 day of URI symptoms. Rapid COVID (-). Encouraged supportive care for viral vs allergic symptoms.   Mychart  -- still not feeling well, sent in Amoxicillin     Today:   Notes her throat is only hurting in AM and at night  Still having constant nasal congestion/post-nasal drip       Review of Systems   Constitutional:  Negative for fever.   HENT:  Positive for congestion, postnasal drip, sinus pressure and sore throat. Negative for ear pain ((+) clogged) and rhinorrhea.    Respiratory:  Positive for cough and shortness of breath (unsure if due to nasal congestion).    Gastrointestinal:  Positive for diarrhea (with antibiotic). Negative for abdominal pain and vomiting.   Musculoskeletal:  Negative for myalgias.       Objective   /84   Pulse 90   Temp (!) 97 °F (36.1 °C)   Ht 5' 6\" (1.676 m)   Wt 121 kg (267 lb 3.2 oz)   SpO2 98%   BMI 43.13 kg/m²      Physical Exam  Vitals and nursing note reviewed.   Constitutional:       General: She is not in acute distress.     Appearance: She is well-developed.   HENT:      Head: Normocephalic and " atraumatic.      Right Ear: Ear canal and external ear normal. A middle ear effusion (mild serous) is present. Tympanic membrane is not erythematous, retracted or bulging.      Left Ear: Ear canal and external ear normal. A middle ear effusion (mild serous) is present. Tympanic membrane is not erythematous, retracted or bulging.      Nose: Rhinorrhea present.      Right Sinus: Maxillary sinus tenderness and frontal sinus tenderness present.      Left Sinus: Maxillary sinus tenderness and frontal sinus tenderness present.      Mouth/Throat:      Mouth: Mucous membranes are moist.      Pharynx: No oropharyngeal exudate or posterior oropharyngeal erythema.   Eyes:      Conjunctiva/sclera: Conjunctivae normal.   Cardiovascular:      Rate and Rhythm: Normal rate and regular rhythm.   Pulmonary:      Effort: Pulmonary effort is normal. No respiratory distress.      Breath sounds: Normal breath sounds. No wheezing, rhonchi or rales.   Lymphadenopathy:      Cervical: No cervical adenopathy.   Skin:     General: Skin is warm and dry.   Neurological:      Mental Status: She is alert.      Comments: Grossly intact   Psychiatric:         Mood and Affect: Mood normal.

## 2025-03-03 ENCOUNTER — OFFICE VISIT (OUTPATIENT)
Dept: FAMILY MEDICINE CLINIC | Facility: CLINIC | Age: 30
End: 2025-03-03
Payer: COMMERCIAL

## 2025-03-03 VITALS
DIASTOLIC BLOOD PRESSURE: 80 MMHG | HEIGHT: 66 IN | BODY MASS INDEX: 43.07 KG/M2 | TEMPERATURE: 97.4 F | SYSTOLIC BLOOD PRESSURE: 130 MMHG | OXYGEN SATURATION: 96 % | WEIGHT: 268 LBS | HEART RATE: 105 BPM

## 2025-03-03 DIAGNOSIS — H66.93 BILATERAL OTITIS MEDIA, UNSPECIFIED OTITIS MEDIA TYPE: Primary | ICD-10-CM

## 2025-03-03 DIAGNOSIS — R05.9 COUGH, UNSPECIFIED TYPE: ICD-10-CM

## 2025-03-03 LAB
S PYO AG THROAT QL: NEGATIVE
SARS-COV-2 AG UPPER RESP QL IA: NEGATIVE
SL AMB POCT RAPID FLU A: NEGATIVE
SL AMB POCT RAPID FLU B: NEGATIVE
VALID CONTROL: NORMAL

## 2025-03-03 PROCEDURE — 87636 SARSCOV2 & INF A&B AMP PRB: CPT

## 2025-03-03 PROCEDURE — 87804 INFLUENZA ASSAY W/OPTIC: CPT

## 2025-03-03 PROCEDURE — 99213 OFFICE O/P EST LOW 20 MIN: CPT

## 2025-03-03 PROCEDURE — 87880 STREP A ASSAY W/OPTIC: CPT

## 2025-03-03 PROCEDURE — 87811 SARS-COV-2 COVID19 W/OPTIC: CPT

## 2025-03-03 PROCEDURE — 87070 CULTURE OTHR SPECIMN AEROBIC: CPT

## 2025-03-03 RX ORDER — CEFDINIR 300 MG/1
300 CAPSULE ORAL EVERY 12 HOURS SCHEDULED
Qty: 14 CAPSULE | Refills: 0 | Status: SHIPPED | OUTPATIENT
Start: 2025-03-03 | End: 2025-03-10

## 2025-03-03 NOTE — PROGRESS NOTES
"Name: Antony Rodriguez      : 1995      MRN: [id number]  Encounter Provider: Jaylyn Venegas PA-C  Encounter Date: 3/3/2025   Encounter department: [facility]  :  Assessment & Plan  Bilateral otitis media, unspecified otitis media type  Cough, congestion, fevers, sore throat, ear pain x 2 days  Rapid flu, covid, and strep testing negative  Sent strep for culture along with PCR covid/flu testing  Physical exam reveals erythematous, bulging TM b/l (R>L), nasal congestion and posterior pharyngeal erythema but otherwise unremarkable with O2 96% on room air and afebrile which is reassuring  Based on exam findings, will cover for b/l otitis media with cefdinir (recently on amoxicillin within the last month, so will avoid as cefdinir is safe to take with breast feeding as well) -- discussed side effects, advised to take with food  Otherwise, continue supportive care, stay hydrated, rest, and albuterol inhaler as needed  Advised ER for worsening symptoms, chest pain, trouble breathing  Will follow up with throat culture/PCR testing when received.   To call with any questions/concerns. Work note provided.     Orders:    cefdinir (OMNICEF) 300 mg capsule; Take 1 capsule (300 mg total) by mouth every 12 (twelve) hours for 7 days    Cough, unspecified type    Orders:    POCT rapid flu A and B    POCT Rapid Covid Ag    POCT rapid ANTIGEN strepA    Throat culture; Future    Covid/Flu- Office Collect Normal; Future           History of Present Illness   CC: cough, congestion, ear pain, fever x 2 days    Patient presents for evaluation of cough, congestion, fever, ear pain x  2 days.   She was seen last month for sinus infxn, treated with amoxicillin. Reports she finished abx and felt better.   Was feeling back to normal until current symptoms started.   She is limited in what she can take due to breast feeding.   She does report she has felt \"winded\". She does have asthma. Has been using albuterol " "inhaler.   No GI symptoms.   Reports her son was sick with similar symptoms yesterday.       Review of Systems   Constitutional:  Positive for fever. Negative for chills and diaphoresis.   HENT:  Positive for congestion, ear pain and sore throat.    Respiratory:  Positive for cough and shortness of breath. Negative for chest tightness and wheezing.    Cardiovascular:  Negative for chest pain and palpitations.   Neurological:  Negative for dizziness, light-headedness and headaches.       Objective   /80   Pulse 105   Temp (!) 97.4 °F (36.3 °C)   Ht 5' 6\" (1.676 m)   Wt 122 kg (268 lb)   SpO2 96%   Breastfeeding Yes   BMI 43.26 kg/m²      Physical Exam  Vitals reviewed.   Constitutional:       General: She is not in acute distress.     Appearance: Normal appearance. She is not ill-appearing or diaphoretic.   HENT:      Head: Normocephalic and atraumatic.      Right Ear: Ear canal and external ear normal. There is no impacted cerumen. Tympanic membrane is erythematous and bulging.      Left Ear: Ear canal and external ear normal. There is no impacted cerumen. Tympanic membrane is erythematous and bulging.      Nose: Congestion present. No rhinorrhea.      Mouth/Throat:      Mouth: Mucous membranes are moist.      Pharynx: Oropharynx is clear. Posterior oropharyngeal erythema present. No oropharyngeal exudate.   Eyes:      General:         Right eye: No discharge.         Left eye: No discharge.      Conjunctiva/sclera: Conjunctivae normal.   Cardiovascular:      Rate and Rhythm: Regular rhythm.      Heart sounds: Normal heart sounds. No murmur heard.  Pulmonary:      Effort: Pulmonary effort is normal. No respiratory distress.      Breath sounds: Normal breath sounds. No wheezing, rhonchi or rales.   Musculoskeletal:         General: Normal range of motion.      Cervical back: Normal range of motion and neck supple.      Right lower leg: No edema.      Left lower leg: No edema.   Lymphadenopathy:      " Cervical: No cervical adenopathy.   Skin:     General: Skin is warm.   Neurological:      General: No focal deficit present.      Mental Status: She is alert.      Gait: Gait normal.   Psychiatric:         Mood and Affect: Mood normal.

## 2025-03-03 NOTE — LETTER
March 3, 2025     Patient: Antony Rodriguez  YOB: 1995  Date of Visit: 3/3/2025      To Whom it May Concern:    Antony Rodriguez is under my professional care. Antony was seen in my office on 3/3/2025. Antony may return to school on 3/5/25 .    If you have any questions or concerns, please don't hesitate to call.         Sincerely,          Jaylyn Venegas PA-C        CC: No Recipients

## 2025-03-04 ENCOUNTER — RESULTS FOLLOW-UP (OUTPATIENT)
Dept: FAMILY MEDICINE CLINIC | Facility: CLINIC | Age: 30
End: 2025-03-04

## 2025-03-04 ENCOUNTER — APPOINTMENT (OUTPATIENT)
Dept: RADIOLOGY | Facility: CLINIC | Age: 30
End: 2025-03-04
Payer: COMMERCIAL

## 2025-03-04 ENCOUNTER — PATIENT MESSAGE (OUTPATIENT)
Dept: FAMILY MEDICINE CLINIC | Facility: CLINIC | Age: 30
End: 2025-03-04

## 2025-03-04 DIAGNOSIS — R05.2 SUBACUTE COUGH: ICD-10-CM

## 2025-03-04 DIAGNOSIS — R05.2 SUBACUTE COUGH: Primary | ICD-10-CM

## 2025-03-04 LAB
FLUAV RNA RESP QL NAA+PROBE: NEGATIVE
FLUBV RNA RESP QL NAA+PROBE: NEGATIVE
SARS-COV-2 RNA RESP QL NAA+PROBE: NEGATIVE

## 2025-03-04 PROCEDURE — 71046 X-RAY EXAM CHEST 2 VIEWS: CPT

## 2025-03-05 ENCOUNTER — RESULTS FOLLOW-UP (OUTPATIENT)
Dept: FAMILY MEDICINE CLINIC | Facility: CLINIC | Age: 30
End: 2025-03-05

## 2025-03-06 LAB — BACTERIA THROAT CULT: NORMAL

## 2025-03-28 ENCOUNTER — ANNUAL EXAM (OUTPATIENT)
Dept: OBGYN CLINIC | Facility: CLINIC | Age: 30
End: 2025-03-28
Payer: COMMERCIAL

## 2025-03-28 VITALS
DIASTOLIC BLOOD PRESSURE: 74 MMHG | BODY MASS INDEX: 43.04 KG/M2 | WEIGHT: 267.8 LBS | SYSTOLIC BLOOD PRESSURE: 128 MMHG | HEIGHT: 66 IN

## 2025-03-28 DIAGNOSIS — Z01.419 WOMEN'S ANNUAL ROUTINE GYNECOLOGICAL EXAMINATION: Primary | ICD-10-CM

## 2025-03-28 PROCEDURE — S0612 ANNUAL GYNECOLOGICAL EXAMINA: HCPCS | Performed by: PHYSICIAN ASSISTANT

## 2025-03-28 NOTE — PROGRESS NOTES
ASSESSMENT & PLAN:   Diagnoses and all orders for this visit:    Women's annual routine gynecological examination          The following were reviewed in today's visit: ASCCP guidelines, Gardasil vaccination, STD testing breast self exam, STD testing, exercise, and healthy diet.    Patient to return to office in yearly for annual exam.     All questions have been answered to her satisfaction.        CC:  Annual Gynecologic Examination  Chief Complaint   Patient presents with    Gynecologic Exam     Pt here for yearly exam, pap UTD  Has not gotten period yet, breastfeeding  No bladder/bowel/breast concerns       HPI: Antony Rodriguez is a 29 y.o.  who presents for annual gynecologic examination.  She has the following concerns:  none. Breast feeding. No menses. Plans to attempt conception in the summer months      Health Maintenance:    Exercise: intermittently  Breast exams/breast awareness: yes    Past Medical History:   Diagnosis Date    Allergic     Anemia     Anxiety     Asthma     Colitis, chronic, ulcerative (HCC)     Disease of thyroid gland     Endometriosis     GERD (gastroesophageal reflux disease)     Gestational hypertension 2024    Hashimoto's disease     Headache(784.0)     Heartburn     Hypothyroidism     Immune deficiency disorder (HCC)     Irregular menses 2022    Migraine     Otitis media     Palpitations 2022    Pneumonia     Rash     Status post primary low transverse  section 2024    Ulcerative colitis (HCC)     crohn's disease, takes Humara weekly    Urinary tract infection     Urticaria     Varicella     disease as child and vaccinated       Past Surgical History:   Procedure Laterality Date    ADENOIDECTOMY      COLONOSCOPY      COLPOSCOPY      EAR SURGERY Bilateral     Tubes    MEDIPORT INSERTION, SINGLE      x2    MOLE REMOVAL      IA  DELIVERY ONLY N/A 2024    Procedure:  SECTION ();  Surgeon: Diana Khalil DO;   Location: [facility] ;  Service: Obstetrics       Past OB/Gyn History:   No LMP recorded.    Last Pap: 2023 : no abnormalities  History of abnormal Pap smear: yes  HPV vaccine completed: unknown    Patient is currently sexually active.   STD testing: no  Current contraception:coitus interruptus      Family History  Family History   Problem Relation Age of Onset    Diabetes Mother     Migraines Mother     DEDE disease Mother     Asthma Mother     Allergies Mother     Diabetes type II Mother     Infertility Mother         And father    Bipolar disorder Father     Anxiety disorder Father     Hypertension Father     Allergies Father     Hypertension Maternal Grandmother        Family history of uterine or ovarian cancer: no  Family history of breast cancer: no  Family history of colon cancer: no    Social History:  Social History     Socioeconomic History    Marital status: /Civil Union     Spouse name: Not on file    Number of children: Not on file    Years of education: Not on file    Highest education level: Not on file   Occupational History    Occupation: pt aide    Tobacco Use    Smoking status: Never     Passive exposure: Never    Smokeless tobacco: Never   Vaping Use    Vaping status: Never Used   Substance and Sexual Activity    Alcohol use: Yes     Comment: Social drink.    Drug use: Never    Sexual activity: Yes     Partners: Male   Other Topics Concern    Not on file   Social History Narrative    Lives with      PT at [facility]     Do you have pets? 2 dog Is pet allowed in bedroom?Yes    Are you a smoker? Never    Does anyone smoke in your home? No       Do you live with smokers? No    [place] frequently? No   How many times a year? N/A      Social Drivers of Health     Financial Resource Strain: Not on file   Food Insecurity: No Food Insecurity (5/27/2024)    Nursing - Inadequate Food Risk Classification     Worried About Running Out of Food in the Last Year: Never true     Ran Out of Food in  the Last Year: Never true     Ran Out of Food in the Last Year: Not on file   Transportation Needs: No Transportation Needs (5/27/2024)    PRAPARE - Transportation     Lack of Transportation (Medical): No     Lack of Transportation (Non-Medical): No   Physical Activity: Insufficiently Active (4/28/2021)    Exercise Vital Sign     Days of Exercise per Week: 3 days     Minutes of Exercise per Session: 40 min   Stress: Stress Concern Present (4/28/2021)    [employer] [place] [employer] - Occupational Stress Questionnaire     Feeling of Stress : Rather much   Social Connections: Unknown (6/18/2024)    Received from [employer]     How often do you feel lonely or isolated from those around you? (Adult - for ages 18 years and over): Not on file   Intimate Partner Violence: Not At Risk (4/28/2021)    Humiliation, Afraid, Rape, and Kick questionnaire     Fear of Current or Ex-Partner: No     Emotionally Abused: No     Physically Abused: No     Sexually Abused: No   Housing Stability: Low Risk  (5/27/2024)    Housing Stability Vital Sign     Unable to Pay for Housing in the Last Year: No     Number of Times Moved in the Last Year: 1     Homeless in the Last Year: No     Domestic violence screen: negative    Allergies:  Allergies   Allergen Reactions    Bee Venom Anaphylaxis     breathing  breathing      Cinnamon - Food Allergy Anaphylaxis     Lab test 0.  Per pt she has tolerated many times by accident, although has not had formal food challenge by allergy    Albuterol Tachycardia       Medications:    Current Outpatient Medications:     albuterol (PROVENTIL HFA,VENTOLIN HFA) 90 mcg/act inhaler, Inhale 2 puffs every 4 (four) hours as needed for wheezing, Disp: 18 g, Rfl: 3    budesonide (Pulmicort Flexhaler) 90 MCG/ACT inhaler, Inhale 1 puff 2 (two) times a day as needed (wheezing) Rinse mouth after use., Disp: 1 each, Rfl: 3    cholecalciferol (VITAMIN D3) 1,000 units tablet, Take 1,000 Units  "by mouth daily, Disp: , Rfl:     Humira, 2 Pen, 40 MG/0.4ML PNKT, Inject 1 pen (40 mg) under the skin once a week (every 7 days)., Disp: 4 each, Rfl: 10    Immune Globulin, Human, (Hizentra) 10 GM/50ML SOLN, Inject 11 g under the skin once a week, Disp: 220 mL, Rfl: 11    levocetirizine (Xyzal Allergy 24HR) 5 MG tablet, Take 1 tablet (5 mg total) by mouth daily at bedtime, Disp: 90 tablet, Rfl: 0    omeprazole (PriLOSEC) 40 MG capsule, Take 1 capsule (40 mg total) by mouth daily, Disp: 90 capsule, Rfl: 1    Prenatal-FeFum-FA-DHA w/o A (PRENATAL + DHA PO), Take by mouth in the morning, Disp: , Rfl:     Synthroid 100 MCG tablet, Take 1 tablet (100 mcg total) by mouth daily, Disp: 90 tablet, Rfl: 1    Review of Systems:  Review of Systems   Constitutional:  Negative for chills, fever and unexpected weight change.   Respiratory:  Negative for shortness of breath.    Cardiovascular:  Negative for chest pain.   Gastrointestinal:  Negative for abdominal pain, diarrhea, nausea and vomiting.   Skin:  Negative for rash.   Psychiatric/Behavioral:  Negative for dysphoric mood. The patient is not nervous/anxious.          Physical Exam:  /74 (BP Location: Left arm, Patient Position: Sitting, Cuff Size: Standard)   Ht 5' 6\" (1.676 m)   Wt 121 kg (267 lb 12.8 oz)   Breastfeeding Yes   BMI 43.22 kg/m²    Physical Exam  Constitutional:       Appearance: Normal appearance.   Genitourinary:      Vulva and urethral meatus normal.      No lesions in the vagina.      Right Labia: No rash or lesions.     Left Labia: No lesions or rash.     No vaginal discharge, erythema or bleeding.        Right Adnexa: not tender and no mass present.     Left Adnexa: not tender and no mass present.     No cervical discharge or lesion.      Uterus is not tender.   Breasts:     Breast exam comments: Deferred due to breastfeeding status.  HENT:      Head: Normocephalic and atraumatic.   Cardiovascular:      Rate and Rhythm: Normal rate and regular " rhythm.      Heart sounds: Normal heart sounds. No murmur heard.     No friction rub. No gallop.   Pulmonary:      Effort: Pulmonary effort is normal.      Breath sounds: Normal breath sounds. No wheezing, rhonchi or rales.   Abdominal:      General: Abdomen is flat. There is no distension.      Palpations: Abdomen is soft.      Tenderness: There is no abdominal tenderness.   Musculoskeletal:      Cervical back: Neck supple.   Neurological:      General: No focal deficit present.      Mental Status: She is alert.   Skin:     General: Skin is warm and dry.   Psychiatric:         Mood and Affect: Mood normal.         Behavior: Behavior normal.   Vitals reviewed.

## 2025-04-09 DIAGNOSIS — K21.9 GASTROESOPHAGEAL REFLUX DISEASE, UNSPECIFIED WHETHER ESOPHAGITIS PRESENT: ICD-10-CM

## 2025-04-09 DIAGNOSIS — E06.3 HYPOTHYROIDISM DUE TO HASHIMOTO'S THYROIDITIS: ICD-10-CM

## 2025-04-09 RX ORDER — LEVOTHYROXINE SODIUM 100 MCG
100 TABLET ORAL DAILY
Qty: 90 TABLET | Refills: 1 | Status: SHIPPED | OUTPATIENT
Start: 2025-04-09

## 2025-04-09 RX ORDER — OMEPRAZOLE 40 MG/1
40 CAPSULE, DELAYED RELEASE ORAL DAILY
Qty: 90 CAPSULE | Refills: 1 | Status: SHIPPED | OUTPATIENT
Start: 2025-04-09

## 2025-04-25 ENCOUNTER — OFFICE VISIT (OUTPATIENT)
Age: 30
End: 2025-04-25

## 2025-04-25 VITALS
HEART RATE: 85 BPM | OXYGEN SATURATION: 98 % | HEIGHT: 66 IN | DIASTOLIC BLOOD PRESSURE: 78 MMHG | TEMPERATURE: 97.6 F | BODY MASS INDEX: 42.27 KG/M2 | WEIGHT: 263 LBS | SYSTOLIC BLOOD PRESSURE: 116 MMHG

## 2025-04-25 DIAGNOSIS — D84.9 IMMUNOCOMPROMISED (HCC): ICD-10-CM

## 2025-04-25 DIAGNOSIS — K50.10 CROHN'S DISEASE OF COLON WITHOUT COMPLICATION (HCC): Primary | ICD-10-CM

## 2025-04-25 NOTE — PROGRESS NOTES
Gastroenterology Outpatient Follow-up - Ulcerative Colitis  Antony Rodriguez 29 y.o. female MRN: [id number]  Encounter: [id number]    Antony Rodriguez is a 29 y.o. female with Ulcerative colitis.    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  She was a nonresponder to mesalamine and 6-MP and is in clinical and endoscopic remission on weekly adalimumab.    Ulcerative Colitis Summary  Macroscopic extent of diseases: pancolitis  Microscopic extent of diseases:  pancolitis  Has the patient ever been hospitalized for severe disease: No        Surgical History  Number of IBD surgeries: 0      First IBD surgery:    Most Recent IBD surgery:    Esophageal:  0    Gastroduodenal:  0    Small bowel resection(s):  0    Ileocolonic resection(s): 0      Colonic resection(s):  0    Ileostomy or colostomy:  no previous ostomy    Complete colectomy:  No         Medications     Year last used Reason for discontinuation   Corticosteroids prior   2019       Thiopurines prior   2018 SD     Methotrexate   never         Infliximab   never         Adalimumab prior     CR Required dose escalation to weekly.   Certolizumab   never         Golimumab   never         Natalizumab   never         Mesalamine prior     AE Worsening diarrhea.   Sulfasalzine   never         Vedolizumab   never         Ustekinumab   never         Tofacitinib   never         Other biologic   never             Extraintestinal Manifestations    IBD-associated arthropathy No    Uveitis No    Oral aphthous ulcers No   Erythema nodosum No    Pyoderma gangrenosum No    Primary sclerosing cholangitis No    Thrombotic complications No          Cancer / Dysplasia History  History of IBD-associated dysplasia: none    Date of diagnosis (Year):     History of colorectal cancer: No   History of cervical dysplasia: No   History of skin cancer: none         Laboratory Data   Most recent  (date) Result   PPD   unknown   Quanitferon gold 5/16/2023 negative   TPMT 9/22/2018 low   Hepatitis A   unknown   HBsAb 9/22/2020 positive   HBcAb 9/22/2020 negative   HBsAg 9/14/2023 negative   HCV Ab   unknown       Imaging / Diagnostic Procedures   Most recent (date) Findings   Colonoscopy or sigmoidoscopy #1 4/1/2022            Ulcers/Erosions  no erosions           Strictures  none           Stricture Severity  N/A           Endoscopic Score Quiros Score:  0 Rutgeert's:  N/A            Findings  Internal hemorrhoids were found. The hemorrhoids were small.  - There is no visible evidence of active colitis seen.  - The colonoscope was advanced to the cecum and then advanced 5 cm into the terminal ileum which was normal.  - Narrow band imaging was performed throughout the colon.  - Surveillace biopsies were otained throughout the colon.           Pathology  A. Small intestine, duodenum, biopsy:  - Focal chronic duodenitis.  B. Stomach, polyp, polypectomy:  - Fundic gland polyp.  C. Esophagus, gastroesophageal junction, biopsy:  - Cardio-oxyntic mucosa with mild chronic active inflammation, negative for intestinal metaplasia and dysplasia.  D. Colon, cecum, biopsy:  - Colonic mucosa with no specific pathologic change.  E. Colon, ascending, biopsy:  - Colonic mucosa with no specific pathologic change.  F. Colon, proximal transverse, biopsy:  - Colonic mucosa with no specific pathologic change.  G. Colon, distal transverse, biopsy:  - Colonic mucosa with no specific pathologic change.  H. Colon, @ 50 cm, biopsy:  - Colonic mucosa with no specific pathologic change.  I. Colon, @ 40 cm, biopsy:  - Colonic mucosa with no specific pathologic change.  J. Colon, @ 30cm, biopsy:  - Colonic mucosa with no specific pathologic change.  K. Colon, @ 20 cm, biopsy:  - Colonic mucosa with no specific pathologic change.  L. Rectum, biopsy:  - Colonic mucosa with no specific pathologic change.   Colonoscopy or sigmoidoscopy #2               Ulcers/Erosions                 Strictures                 Stricture Severity              Endoscopic Score Quiros Score:    Rugeert's:              Findings              Pathology      EGD 2022 Irregular Z-line, 1 cm hiatal hernia, 3 mm gastric polyp removed.   Small bowel follow-through       CT enterography       CT without enterography       MRI enterography       Capsule study       DEXA scan   Lowest Z-score:      CXR (for TB)           HPI:   Interval History:  2025 Follow up  Doing very well on adalimumab once weekly.  In March, she went one month without her medication due to insurance issues, but these issues have been resolved and she is now back on her medication and feeling fully back to normal.  She has normal bowel function with 1 formed BM daily, no rectal bleeding, and only mild urgency at times.  Continues on IVIG once weekly for CVID.  She had her flu shot this past year.  She continues to breast-feed and her son will be turning 1 next month.  He has now received his live vaccines.    2024 Follow up  She is doing well and remains on weekly adalimumab. Since the last visit, she had placenta previa and had emergency  in May. The baby had to stay in the NICU for some time, but now is doing great. No live vaccines given. Antony stayed in adalimumab throughout her pregnancy.  She felt very stressed while the baby was in the NICU and had diarrhea, but now everything is back to baseline constipation. She is using Miralax, which helps. Continuing with IVIG. Planning to get flu and COVID boosters. Last Derm appoint ment was in . She has had pneumococcal vaccines. Last colonoscopy was in  and she was in remission. CBC from 2024 normal with Hgb 14, MCV 87. CMP normal with Cr 0.68 and normal LFTs. TSH normal in .     3/13/2024 Follow up  Antony is doing well.  She is now 6 months pregnant.  She has stayed on adalimumab, weekly.  She feels constipated with 5 BMs per  week.  Stools are hard and she is straining.  She was told not to take MiraLAX while pregnant.  Instead, she has been taking Metamucil, gummy bears, gummy bars, and vegetables.  She is drinking 90 fl oz per day.  Some bleeding this past week while straining.  Continues to get IVIG and the dose was increased based on low immunoglobulin levels.  Recent labs from March 11 reviewed.  Mild leukocytosis with WBC 12.7, hemoglobin 11.5, MCV 85, LFTs and kidney function normal.  Free T4 a little low.    9/17/2023 Initial visit  Antony Rodriguez is establishing care with me for ulcerative pan-colitis (initially diagnosed as Crohn's disease).  She also has CVID and receives IVIG.  She was previously seeing Dr. Mcnulty at [facility].    She reports history of chronic constipation for most of her life.  Her first colonoscopy was in 2006 (11 years old) and the mucosa appeared normal but biopsies from the cecum showed nonspecific chronic inflammation.  In 2018, while in college, she developed bloody diarrhea, abdominal pain, and weight loss, and had another colonoscopy which found acute inflammation in the cecum and rectum, and she was diagnosed with Crohn's colitis.  She was initially treated with mesalamine (Lialda) but this caused her diarrhea to worsen.  She then establish care with Dr. John at [facility] and was started on 6-MP with partial response (improvement in pain but ongoing bleeding and calprotectin remained elevated).  In 2019, she started adalimumab after flexible sigmoidoscopy showed ongoing active disease despite 6-MP dose increased to 75 mg.  Prior to adalimumab, she was on a prednisone taper.    Adalimumab was started in May 2019 and was escalated to 40 mg every 7 days and September 2020 based on drug level and ongoing clinical symptoms.  She felt improved with higher dose of adalimumab.  She is also on omeprazole for reflux and upper abdominal symptoms, and has history of treated H. pylori infection. Her last  colonoscopy was in April 2022 and was normal.  She feels that she is in clinical remission.  Her last Pap smear was a year ago and she has another appointment scheduled for October.  She has not seen Dermatology.  She is up-to-date with COVID and flu vaccinations.  She has also had pneumococcal vaccines.    Antony reports the following symptoms over the last 7 days:    Stool Frequency normal   Average stools per day: 1   Average liquid stools per day: 0   Consistency of bowel: soft or semi-formed   Awakening from sleep to move bowels? No   Urgency mild fecal urgency   Visible blood in stool? none   Abdominal pain? mild   General Wellbeing? generally well     Antony also reports the following symptoms in the last month:    Leakage of stool while sleeping? No   Leakage of stool while awake? No       REVIEW OF SYSTEMS:  During the last 7 days, Antony experienced the following symptoms:  Unintentional Weight Loss (in the last month) No   Fever No   Eye irritation No   Mouth sores No   Sore throat No   Chest pain No   Shortness of breath No   Numbness or tingling in hands or feet No   Skin rash No   Pain or swelling in joints No   Bruising or bleeding No   Felt depressed or blue No   Fatigue No   Dysuria No   Please see HPI for additional pertinent review of systems; otherwise remainder of ROS was unremarkable    MEDICATIONS:    Current Outpatient Medications:   •  albuterol (PROVENTIL HFA,VENTOLIN HFA) 90 mcg/act inhaler  •  budesonide (Pulmicort Flexhaler) 90 MCG/ACT inhaler  •  cholecalciferol (VITAMIN D3) 1,000 units tablet  •  Humira, 2 Pen, 40 MG/0.4ML PNKT  •  Immune Globulin, Human, (Hizentra) 10 GM/50ML SOLN  •  levocetirizine (Xyzal Allergy 24HR) 5 MG tablet  •  omeprazole (PriLOSEC) 40 MG capsule  •  Prenatal-FeFum-FA-DHA w/o A (PRENATAL + DHA PO)  •  Synthroid 100 MCG tablet    ALLERGIES:  Allergies   Allergen Reactions   • Bee Venom Anaphylaxis     breathing  breathing     • Cinnamon - Food Allergy  "Anaphylaxis     Lab test 0.  Per pt she has tolerated many times by accident, although has not had formal food challenge by allergy   • Albuterol Tachycardia       OBJECTIVE:  /78 (BP Location: Right arm, Patient Position: Sitting, Cuff Size: Large)   Pulse 85   Temp 97.6 °F (36.4 °C) (Tympanic)   Ht 5' 6\" (1.676 m)   Wt 119 kg (263 lb)   SpO2 98%   BMI 42.45 kg/m²      PHYSICAL EXAM:    General Appearance:   Alert, cooperative, no distress   HEENT:   Normocephalic, atraumatic, anicteric.     Neck:  Supple, symmetrical, trachea midline   Lungs:   Respirations unlabored    Extremities:  No cyanosis, clubbing or edema    Skin:  No jaundice, rashes, or lesions      Lab Results   Component Value Date    WBC 9.47 12/18/2024    HGB 14.2 12/18/2024    HCT 42.7 12/18/2024    MCV 86 12/18/2024     12/18/2024     Lab Results   Component Value Date    SODIUM 138 12/18/2024    K 4.3 12/18/2024     12/18/2024    CO2 25 12/18/2024    AGAP 9 12/18/2024    BUN 20 12/18/2024    CREATININE 0.58 (L) 12/18/2024    GLUC 94 12/18/2024    GLUF 85 05/04/2024    CALCIUM 9.4 12/18/2024    AST 24 12/18/2024    ALT 21 12/18/2024    ALKPHOS 116 (H) 12/18/2024    TP 7.2 12/18/2024    TBILI 0.62 12/18/2024    EGFR 126 12/18/2024     Lab Results   Component Value Date    CRP 2.9 09/14/2023     Lab Results   Component Value Date    HOGKXXDX32 305 10/27/2021     Lab Results   Component Value Date    FERRITIN 17 09/14/2023       ASSESSMENT AND PLAN:    Antony has a history of macroscopic pancolitis and microscopic pancolitis ulcerative colitis diagnosed in 2018. Current medical therapy is with adalimumab. My global assessment is that the clinical disease is currently quiescent.     The 6-point Quiros score was 0 and the 9-point Quiros score was 0.  The short CDAI was 79.      Antony has history of loving-ulcerative colitis diagnosed in 2018. She also has CVID and is on IVIG. She is on weekly adalimumab and is in clinical and " endoscopic remission.  Last year she delivered a healthy baby and remained on adalimumab throughout the pregnancy. She is constipated at baseline.     1.  Continue adalimumab 40 mg weekly.  2.  OK to breastfeed.  3.  Recheck CBC, CMP, CRP.  4.  Recheck vitamin D, vitamin B12, and iron levels.  5.  Update quant gold and chronic hepatitis panel.  6.  Stay up to date with vaccines.  6.  Referral to Dermatology already placed.  She is scheduled for consultation in November.  7.  Colonoscopy in 2026 for dysplasia screening.  8.  Continue IVIG and follow up with Immunology.    Return in 6 months.      Health Maintenance Recommendations:  Vaccines & Infections  COVID-19 vaccination and boosters are recommended. There is no evidence that the COVID-19 vaccine would cause an IBD flare.  Avoid live vaccines if on immunosuppressive therapy.  Yearly influenza vaccine (flu shot).  Pneumonia vaccines for patients on immunosuppression. These include Prevnar 20, followed by Pneumovax 23 at least 8 weeks later.  Shingrix vaccine (series of 2 injections) for al patients 65 and older. Patients on tofacitinib or upadacitinib should be vaccinated regardless of age.  If not immune to measles mumps or rubella, MMR vaccine is recommended. However, this is a live vaccine and should be given prior to immunosuppressive therapy.  HPV vaccination as per national guidelines.  Hepatitis A and B vaccinations if not previously vaccinated.  Testing for tuberculosis with QuantiFERON Gold blood test and/or chest xray prior to starting immunosuppressive medications, and then annually    Cancer screening  Dysplasia surveillance for colorectal cancer. Colonoscopy in all patients with extensive colitis (more than 1/3 of the colon involved) who had disease for at least 8 or more years.  Repeat colonoscopy approximately every 12-24 months.  In patients with concurrent primary sclerosing cholangitis, history of dysplasia, or family history of colon cancer,  repeat colonoscopy annually.    Females: Pap smear annually for woman on immunosuppression.  Annual dermatologic/skin exam in all patients with IBD, especially those on immunosuppression with thiopurines or MYRTLE inhibitors.    Miscellaneous  DEXA scan, once off steroids for 3 months  Depression screening recommended annually  Routine dental and ophthalmology examinations    Problem List Items Addressed This Visit        Digestive    Crohn's disease of colon without complication (HCC) - Primary    Relevant Orders    CBC    Comprehensive metabolic panel    C-reactive protein    Iron Panel (Includes Ferritin, Iron Sat%, Iron, and TIBC)    Vitamin B12    Vitamin D 25 hydroxy    Chronic Hepatitis Panel    Quantiferon TB Gold Plus Assay   Other Visit Diagnoses       Immunocompromised (HCC)                Manny Hutchinson PA-C

## 2025-05-08 DIAGNOSIS — J31.0 CHRONIC RHINITIS: ICD-10-CM

## 2025-05-08 DIAGNOSIS — J45.40 MODERATE PERSISTENT ASTHMA WITHOUT COMPLICATION: ICD-10-CM

## 2025-05-09 RX ORDER — LEVOCETIRIZINE DIHYDROCHLORIDE 5 MG/1
5 TABLET, FILM COATED ORAL
Qty: 90 TABLET | Refills: 1 | Status: SHIPPED | OUTPATIENT
Start: 2025-05-09

## 2025-05-28 NOTE — PROGRESS NOTES
Name: Antony Rodriguez      : 1995      MRN: [id number]  Encounter Provider: Melony Baez DO  Encounter Date: 2025   Encounter department: [facility]  :  Assessment & Plan  Moderate persistent asthma with acute exacerbation  Rapid COVID (-). History and exam suggestive of asthma exacerbation in setting of sinusitis. Pt is at increased risk of complication from infection given immunocompromised status with Crohn's treatment. Will start Amoxicillin (Reviewed possible ADRs including GI upset) and Medrol (Reviewed possible ADRs including palpitations, insomnia, increased appetite, mood fluctuations; not to combine with Motrin/Aleve/etc, Tylenol ok). Encouraged continued supportive care with PRN LEONARD, nasal spray. To call if symptoms persist/worsen.   Orders:  •  albuterol (PROVENTIL HFA,VENTOLIN HFA) 90 mcg/act inhaler; Inhale 2 puffs every 4 (four) hours as needed for wheezing  •  amoxicillin (AMOXIL) 875 mg tablet; Take 1 tablet (875 mg total) by mouth 2 (two) times a day for 7 days  •  methylPREDNISolone 4 MG tablet therapy pack; Use as directed on package    Congestion of nasal sinus    Orders:  •  POCT Rapid Covid Ag           History of Present Illness   HPI    Pt presents due to acute illness, onset      Started with chest tightness/shortness of breath -- notes she walked up 5 steps and was dyspneic, sore throat, productive cough   Using nasal spray, but otherwise not taking anything as she is still breastfeeding   Doesn't have an in date inhaler at home   Her son is sick as well    Review of Systems   Constitutional:  Negative for fever.   HENT:  Positive for congestion (at night), ear pain (R), postnasal drip (minimal), rhinorrhea and sore throat.    Respiratory:  Positive for cough, chest tightness and shortness of breath.    Gastrointestinal:  Negative for abdominal pain, diarrhea and vomiting.   Musculoskeletal:  Negative for myalgias.   Neurological:  Positive for  "headaches.       Objective   /86   Pulse 73   Temp 97.7 °F (36.5 °C)   Ht 5' 6\" (1.676 m)   Wt 121 kg (267 lb 6.4 oz)   SpO2 97%   Breastfeeding Yes   BMI 43.16 kg/m²      Physical Exam  Vitals and nursing note reviewed.   Constitutional:       General: She is not in acute distress.     Appearance: She is well-developed.   HENT:      Head: Normocephalic and atraumatic.      Right Ear: Tympanic membrane, ear canal and external ear normal. Tympanic membrane is not erythematous, retracted or bulging.      Left Ear: Tympanic membrane, ear canal and external ear normal. Tympanic membrane is not erythematous, retracted or bulging.      Nose: Mucosal edema and rhinorrhea present.      Right Sinus: No maxillary sinus tenderness or frontal sinus tenderness.      Left Sinus: No maxillary sinus tenderness or frontal sinus tenderness.      Mouth/Throat:      Mouth: Mucous membranes are moist.      Pharynx: No oropharyngeal exudate or posterior oropharyngeal erythema.     Eyes:      Conjunctiva/sclera: Conjunctivae normal.       Cardiovascular:      Rate and Rhythm: Normal rate and regular rhythm.   Pulmonary:      Effort: Pulmonary effort is normal. No respiratory distress.      Breath sounds: Normal breath sounds. No wheezing, rhonchi or rales.   Abdominal:      General: Bowel sounds are normal.   Lymphadenopathy:      Cervical: No cervical adenopathy.     Skin:     General: Skin is warm and dry.     Neurological:      Mental Status: She is alert.      Comments: Grossly intact   Psychiatric:         Mood and Affect: Mood normal.         "

## 2025-05-29 ENCOUNTER — OFFICE VISIT (OUTPATIENT)
Dept: FAMILY MEDICINE CLINIC | Facility: CLINIC | Age: 30
End: 2025-05-29
Payer: COMMERCIAL

## 2025-05-29 VITALS
SYSTOLIC BLOOD PRESSURE: 124 MMHG | DIASTOLIC BLOOD PRESSURE: 86 MMHG | WEIGHT: 267.4 LBS | OXYGEN SATURATION: 97 % | HEIGHT: 66 IN | TEMPERATURE: 97.7 F | BODY MASS INDEX: 42.97 KG/M2 | HEART RATE: 73 BPM

## 2025-05-29 DIAGNOSIS — R09.81 CONGESTION OF NASAL SINUS: ICD-10-CM

## 2025-05-29 DIAGNOSIS — J45.41 MODERATE PERSISTENT ASTHMA WITH ACUTE EXACERBATION: Primary | ICD-10-CM

## 2025-05-29 LAB
SARS-COV-2 AG UPPER RESP QL IA: NEGATIVE
VALID CONTROL: NORMAL

## 2025-05-29 PROCEDURE — 99214 OFFICE O/P EST MOD 30 MIN: CPT | Performed by: FAMILY MEDICINE

## 2025-05-29 PROCEDURE — 87811 SARS-COV-2 COVID19 W/OPTIC: CPT | Performed by: FAMILY MEDICINE

## 2025-05-29 RX ORDER — AMOXICILLIN 875 MG/1
875 TABLET, COATED ORAL 2 TIMES DAILY
Qty: 14 TABLET | Refills: 0 | Status: SHIPPED | OUTPATIENT
Start: 2025-05-29 | End: 2025-06-05

## 2025-05-29 RX ORDER — METHYLPREDNISOLONE 4 MG/1
TABLET ORAL
Qty: 21 EACH | Refills: 0 | Status: SHIPPED | OUTPATIENT
Start: 2025-05-29

## 2025-05-29 RX ORDER — ALBUTEROL SULFATE 90 UG/1
2 INHALANT RESPIRATORY (INHALATION) EVERY 4 HOURS PRN
Qty: 18 G | Refills: 3 | Status: SHIPPED | OUTPATIENT
Start: 2025-05-29

## 2025-05-29 NOTE — ASSESSMENT & PLAN NOTE
Rapid COVID (-). History and exam suggestive of asthma exacerbation in setting of sinusitis. Pt is at increased risk of complication from infection given immunocompromised status with Crohn's treatment. Will start Amoxicillin (Reviewed possible ADRs including GI upset) and Medrol (Reviewed possible ADRs including palpitations, insomnia, increased appetite, mood fluctuations; not to combine with Motrin/Aleve/etc, Tylenol ok). Encouraged continued supportive care with PRN LEONARD, nasal spray. To call if symptoms persist/worsen.   Orders:  •  albuterol (PROVENTIL HFA,VENTOLIN HFA) 90 mcg/act inhaler; Inhale 2 puffs every 4 (four) hours as needed for wheezing  •  amoxicillin (AMOXIL) 875 mg tablet; Take 1 tablet (875 mg total) by mouth 2 (two) times a day for 7 days  •  methylPREDNISolone 4 MG tablet therapy pack; Use as directed on package

## 2025-06-10 ENCOUNTER — OFFICE VISIT (OUTPATIENT)
Dept: FAMILY MEDICINE CLINIC | Facility: CLINIC | Age: 30
End: 2025-06-10
Payer: COMMERCIAL

## 2025-06-10 VITALS
SYSTOLIC BLOOD PRESSURE: 116 MMHG | DIASTOLIC BLOOD PRESSURE: 70 MMHG | BODY MASS INDEX: 42.75 KG/M2 | OXYGEN SATURATION: 98 % | TEMPERATURE: 97.2 F | HEIGHT: 66 IN | WEIGHT: 266 LBS | HEART RATE: 78 BPM

## 2025-06-10 DIAGNOSIS — J02.9 PHARYNGITIS, UNSPECIFIED ETIOLOGY: Primary | ICD-10-CM

## 2025-06-10 DIAGNOSIS — J02.9 SORE THROAT: ICD-10-CM

## 2025-06-10 LAB — S PYO AG THROAT QL: NEGATIVE

## 2025-06-10 PROCEDURE — 87070 CULTURE OTHR SPECIMN AEROBIC: CPT

## 2025-06-10 PROCEDURE — 87880 STREP A ASSAY W/OPTIC: CPT

## 2025-06-10 PROCEDURE — 99213 OFFICE O/P EST LOW 20 MIN: CPT

## 2025-06-10 RX ORDER — AZITHROMYCIN 250 MG/1
TABLET, FILM COATED ORAL
Qty: 6 TABLET | Refills: 0 | Status: SHIPPED | OUTPATIENT
Start: 2025-06-10 | End: 2025-06-14

## 2025-06-10 NOTE — PROGRESS NOTES
Name: Antony Rodriguez      : 1995      MRN: [id number]  Encounter Provider: Jaylyn Venegas PA-C  Encounter Date: 6/10/2025   Encounter department: [facility]  :  Assessment & Plan  Pharyngitis, unspecified etiology  Presents for evaluation of sore throat x 2 days  Recent illness, treated with amoxicillin/medrol. Symptoms resolved until 2 days ago.  and son with similar symptoms.   Recent strep exposure.   Rapid strep negative in office toady. Sent for culture.   Physical exam reveals mild posterior pharyngeal erythema. Otherwise unremarkable. Lungs clear, O2 98% on room air. Afebrile.   Discussed getting CXR. She defers.   Based on current symptoms, recent strep exposure, and high risk due to immunosuppression will cover for bacterial pharyngitis with azithromycin (recent use of PCN). Discussed side effects. Discussed possibility of infant diarrhea/rash with breast feeding which she is aware of.   Otherwise continue supportive care, stay hydrated and rest  To call with any questions or concerns.     Orders:    azithromycin (ZITHROMAX) 250 mg tablet; Take 2 tablets today then 1 tablet daily x 4 days    Sore throat    Orders:    POCT rapid ANTIGEN strepA    Throat culture; Future           History of Present Illness   CC: sore throat    Patient presents for evaluation of sore throat.   Pt was seen on  by Dr Baez. Was treated for asthma exacerbation with amoxicillin and medrol. Pt reports she was feeling much better, however within the last two days developed sore throat and swollen glands.   Her  and son both sick with similar symptoms  She denies any coughing or congestion  No fevers  Her MIL had strep last week and she was around her      Review of Systems   Constitutional:  Negative for chills, diaphoresis and fever.   HENT:  Positive for congestion and sore throat. Negative for ear pain, sinus pressure and sinus pain.    Respiratory:  Negative for chest tightness  "and shortness of breath.    Cardiovascular:  Negative for chest pain and palpitations.       Objective   /70   Pulse 78   Temp (!) 97.2 °F (36.2 °C)   Ht 5' 6\" (1.676 m)   Wt 121 kg (266 lb)   SpO2 98%   BMI 42.93 kg/m²      Physical Exam  Vitals reviewed.   Constitutional:       General: She is not in acute distress.     Appearance: Normal appearance. She is not ill-appearing or diaphoretic.   HENT:      Head: Normocephalic and atraumatic.      Right Ear: Tympanic membrane, ear canal and external ear normal. There is no impacted cerumen.      Left Ear: Tympanic membrane, ear canal and external ear normal. There is no impacted cerumen.      Nose: Congestion present. No rhinorrhea.      Mouth/Throat:      Mouth: Mucous membranes are moist.      Pharynx: Oropharynx is clear. Posterior oropharyngeal erythema present. No oropharyngeal exudate.     Eyes:      Conjunctiva/sclera: Conjunctivae normal.       Cardiovascular:      Rate and Rhythm: Normal rate and regular rhythm.      Heart sounds: Normal heart sounds. No murmur heard.  Pulmonary:      Effort: Pulmonary effort is normal. No respiratory distress.      Breath sounds: Normal breath sounds. No wheezing, rhonchi or rales.     Musculoskeletal:      Cervical back: Neck supple.   Lymphadenopathy:      Cervical: No cervical adenopathy.     Skin:     General: Skin is warm.     Neurological:      General: No focal deficit present.      Mental Status: She is alert.      Gait: Gait normal.     Psychiatric:         Mood and Affect: Mood normal.         "

## 2025-06-12 ENCOUNTER — RESULTS FOLLOW-UP (OUTPATIENT)
Dept: FAMILY MEDICINE CLINIC | Facility: CLINIC | Age: 30
End: 2025-06-12

## 2025-06-12 LAB — BACTERIA THROAT CULT: NORMAL

## 2025-06-19 ENCOUNTER — APPOINTMENT (OUTPATIENT)
Age: 30
End: 2025-06-19
Payer: COMMERCIAL

## 2025-06-19 DIAGNOSIS — Z00.8 HEALTH EXAMINATION IN POPULATION SURVEY: ICD-10-CM

## 2025-06-19 DIAGNOSIS — E55.9 VITAMIN D DEFICIENCY: ICD-10-CM

## 2025-06-19 DIAGNOSIS — E06.3 HYPOTHYROIDISM DUE TO HASHIMOTO'S THYROIDITIS: ICD-10-CM

## 2025-06-19 DIAGNOSIS — D83.9 CVID (COMMON VARIABLE IMMUNODEFICIENCY) (HCC): ICD-10-CM

## 2025-06-19 PROCEDURE — 83036 HEMOGLOBIN GLYCOSYLATED A1C: CPT

## 2025-06-19 PROCEDURE — 82784 ASSAY IGA/IGD/IGG/IGM EACH: CPT

## 2025-06-19 PROCEDURE — 84439 ASSAY OF FREE THYROXINE: CPT

## 2025-06-19 PROCEDURE — 80053 COMPREHEN METABOLIC PANEL: CPT

## 2025-06-19 PROCEDURE — 36415 COLL VENOUS BLD VENIPUNCTURE: CPT

## 2025-06-19 PROCEDURE — 84443 ASSAY THYROID STIM HORMONE: CPT

## 2025-06-19 PROCEDURE — 82306 VITAMIN D 25 HYDROXY: CPT

## 2025-06-19 PROCEDURE — 80061 LIPID PANEL: CPT

## 2025-06-20 ENCOUNTER — OFFICE VISIT (OUTPATIENT)
Dept: ENDOCRINOLOGY | Facility: HOSPITAL | Age: 30
End: 2025-06-20
Payer: COMMERCIAL

## 2025-06-20 VITALS
OXYGEN SATURATION: 97 % | SYSTOLIC BLOOD PRESSURE: 118 MMHG | HEART RATE: 82 BPM | HEIGHT: 66 IN | WEIGHT: 265 LBS | BODY MASS INDEX: 42.59 KG/M2 | DIASTOLIC BLOOD PRESSURE: 88 MMHG

## 2025-06-20 DIAGNOSIS — E55.9 VITAMIN D DEFICIENCY: ICD-10-CM

## 2025-06-20 DIAGNOSIS — E06.3 HYPOTHYROIDISM DUE TO HASHIMOTO'S THYROIDITIS: Primary | ICD-10-CM

## 2025-06-20 LAB
25(OH)D3 SERPL-MCNC: 16.9 NG/ML (ref 30–100)
ALBUMIN SERPL BCG-MCNC: 4.1 G/DL (ref 3.5–5)
ALP SERPL-CCNC: 120 U/L (ref 34–104)
ALT SERPL W P-5'-P-CCNC: 14 U/L (ref 7–52)
ANION GAP SERPL CALCULATED.3IONS-SCNC: 11 MMOL/L (ref 4–13)
AST SERPL W P-5'-P-CCNC: 16 U/L (ref 13–39)
BILIRUB SERPL-MCNC: 0.51 MG/DL (ref 0.2–1)
BUN SERPL-MCNC: 14 MG/DL (ref 5–25)
CALCIUM SERPL-MCNC: 8.9 MG/DL (ref 8.4–10.2)
CHLORIDE SERPL-SCNC: 102 MMOL/L (ref 96–108)
CHOLEST SERPL-MCNC: 205 MG/DL (ref ?–200)
CO2 SERPL-SCNC: 25 MMOL/L (ref 21–32)
CREAT SERPL-MCNC: 0.65 MG/DL (ref 0.6–1.3)
EST. AVERAGE GLUCOSE BLD GHB EST-MCNC: 114 MG/DL
GFR SERPL CREATININE-BSD FRML MDRD: 122 ML/MIN/1.73SQ M
GLUCOSE SERPL-MCNC: 89 MG/DL (ref 65–140)
HBA1C MFR BLD: 5.6 %
HDLC SERPL-MCNC: 56 MG/DL
IGG SERPL-MCNC: 1078 MG/DL (ref 635–1741)
LDLC SERPL CALC-MCNC: 110 MG/DL (ref 0–100)
NONHDLC SERPL-MCNC: 149 MG/DL
POTASSIUM SERPL-SCNC: 3.9 MMOL/L (ref 3.5–5.3)
PROT SERPL-MCNC: 6.9 G/DL (ref 6.4–8.4)
SODIUM SERPL-SCNC: 138 MMOL/L (ref 135–147)
T4 FREE SERPL-MCNC: 0.96 NG/DL (ref 0.61–1.12)
TRIGL SERPL-MCNC: 193 MG/DL (ref ?–150)
TSH SERPL DL<=0.05 MIU/L-ACNC: 1.78 UIU/ML (ref 0.45–4.5)

## 2025-06-20 PROCEDURE — 99214 OFFICE O/P EST MOD 30 MIN: CPT | Performed by: PHYSICIAN ASSISTANT

## 2025-06-20 RX ORDER — ERGOCALCIFEROL 1.25 MG/1
50000 CAPSULE ORAL WEEKLY
Qty: 12 CAPSULE | Refills: 1 | Status: SHIPPED | OUTPATIENT
Start: 2025-06-20

## 2025-06-20 NOTE — PROGRESS NOTES
Name: Antony Rodriguez      : 1995      MRN: [id number]  Encounter Provider: Hoang Kidd PA-C  Encounter Date: 2025   Encounter department: [facility]    No chief complaint on file.  :  Assessment & Plan  Hypothyroidism due to Hashimoto's thyroiditis  Recent thyroid lab work came back normal.  Clinically and biochemically euthyroid.  At this time she will continue with Synthroid 100 mcg daily.  She will contact the office if there is any change in symptoms.  At this time since levels have been stable we will extend out office visits to 1 year.  Orders:  •  Comprehensive metabolic panel; Future  •  TSH, 3rd generation; Future  •  T4, free; Future    Vitamin D deficiency  Vitamin D levels were low even on calcium supplement.  At this time we will start her on vitamin D 50,000 units weekly.  Will have her on this for the next 6 months and recheck vitamin D.  Based on that time we will determine how we want to continue managing the vitamin D.  Orders:  •  ergocalciferol (ERGOCALCIFEROL) 1.25 MG (17074 UT) capsule; Take 1 capsule (50,000 Units total) by mouth once a week  •  Vitamin D 25 hydroxy; Future  •  Vitamin D 25 hydroxy; Future  •  Comprehensive metabolic panel; Future          History of Present Illness     Antony Rodriguez is a 29 y.o. female with hypothyroidism due to Hashimoto's thyroiditis, common variable immunodeficiency, vitamin-D deficiency, colitis presents for follow-up.  She was diagnosed with hypothyroidism about 8 years ago.  She was initially on levothyroxine, but was switched to Synthroid 100 mcg daily at her visit in 2021 as her thyroid function was normal, but continued to have fatigue symptoms.   She takes medication appropriately.  Does have a history of endometriosis.  Is currently in school to become a PTA.   Overall she is feeling okay.  Having some fatigue at this time, but thinks it might be related to her vitamin  "D levels.  No significant neck compressive symptoms but occasionally notes some discomfort.  Denies any heat or cold intolerance, chest pain, palpitations, abdominal pain, constipation, tremors, anxiety or depression, hair loss, dry skin, insomnia. Has some diarrhea due to her UC.  There has been concerns with hyperparathyroidism in the past, but of note she is also had mild hypocalcemia from time to time.  Recently she has had a slightly elevated alkaline phosphatase level.  Vitamin D levels have been found to be low.  Has been taking vitamin D 1000 units daily.  Does have some concerns with her weight, but is currently breast-feeding.    Review of Systems   Constitutional:  Positive for fatigue. Negative for activity change, appetite change, diaphoresis and unexpected weight change.   HENT:  Negative for sore throat, trouble swallowing and voice change.    Eyes:  Negative for visual disturbance.   Respiratory:  Negative for chest tightness and shortness of breath.    Cardiovascular:  Negative for chest pain, palpitations and leg swelling.   Gastrointestinal:  Positive for diarrhea. Negative for abdominal pain and constipation.   Endocrine: Negative for cold intolerance, heat intolerance, polydipsia, polyphagia and polyuria.   Skin:  Negative for rash.   Neurological:  Negative for dizziness, tremors, light-headedness, numbness and headaches.   Hematological:  Negative for adenopathy.   Psychiatric/Behavioral:  Negative for dysphoric mood and sleep disturbance. The patient is not nervous/anxious.     as per HPI  Medical History Reviewed by provider this encounter:     .  Medications Ordered Prior to Encounter[1]   Social History[2]     Medical History Reviewed by provider this encounter:     .    Objective   /88   Pulse 82   Ht 5' 6\" (1.676 m)   Wt 120 kg (265 lb)   SpO2 97%   Breastfeeding Yes   BMI 42.77 kg/m²      Body mass index is 42.77 kg/m².  Wt Readings from Last 3 Encounters:   06/20/25 120 kg " (265 lb)   06/10/25 121 kg (266 lb)   05/29/25 121 kg (267 lb 6.4 oz)     Physical Exam  Vitals and nursing note reviewed.   Constitutional:       General: She is not in acute distress.     Appearance: Normal appearance. She is well-developed. She is not diaphoretic.     Eyes:      General: No scleral icterus.     Extraocular Movements: Extraocular movements intact.      Conjunctiva/sclera: Conjunctivae normal.      Pupils: Pupils are equal, round, and reactive to light.       Cardiovascular:      Rate and Rhythm: Normal rate and regular rhythm.      Pulses: Normal pulses.      Heart sounds: Normal heart sounds. No murmur heard.     No friction rub. No gallop.   Pulmonary:      Effort: Pulmonary effort is normal. No tachypnea, bradypnea or respiratory distress.      Breath sounds: Normal breath sounds. No wheezing.     Musculoskeletal:      Cervical back: Normal range of motion.      Right lower leg: No edema.      Left lower leg: No edema.   Lymphadenopathy:      Cervical: No cervical adenopathy.     Skin:     General: Skin is warm and dry.     Neurological:      Mental Status: She is alert and oriented to person, place, and time. Mental status is at baseline. She is not disoriented.      Motor: No abnormal muscle tone.      Gait: Gait normal.      Deep Tendon Reflexes: Reflexes are normal and symmetric.     Psychiatric:         Mood and Affect: Mood normal.         Behavior: Behavior normal.         Thought Content: Thought content normal.         Labs: I have reviewed pertinent labs including:   Lab Results   Component Value Date    HGBA1C 5.6 06/19/2025    HGBA1C 5.6 09/06/2024    HGBA1C 5.5 09/14/2023      Lab Results   Component Value Date    CREATININE 0.65 06/19/2025    CREATININE 0.58 (L) 12/18/2024    CREATININE 0.68 07/02/2024    BUN 14 06/19/2025    K 3.9 06/19/2025     06/19/2025    CO2 25 06/19/2025      EGFR   Date Value Ref Range Status   07/03/2019 >60.0 >60 Final     Comment:     If patient  "is , multiply estimated GFR by 1.159.     eGFRcr   Date Value Ref Range Status   06/14/2022 94 >=60 mL/min/1.73 m2 Final     Comment:     Estimated glomerular filtration rate (eGFR) is calculated without a race  coefficient. Values should be interpreted in the context of the patient's full  clinical presentation. Reference: Elizabeth Charles et al. \"A unifying approach  for GFR estimation: recommendations of the NKF-ASN task force on reassessing the  inclusion of race in diagnosing kidney disease.\" American Journal of Kidney  Diseases (2021)     eGFR   Date Value Ref Range Status   06/19/2025 122 ml/min/1.73sq m Final      ALT   Date Value Ref Range Status   06/19/2025 14 7 - 52 U/L Final     Comment:     Specimen collection should occur prior to Sulfasalazine administration due to the potential for falsely depressed results.    08/04/2022 38 (H) 0 - 32 IU/L Final   11/20/2018 8 (L) 10 - 35 U/L Final     ALANINE AMINOTRANSFERASE   Date Value Ref Range Status   06/14/2022 21 14 - 54 U/L Final     AST   Date Value Ref Range Status   06/19/2025 16 13 - 39 U/L Final   08/04/2022 28 0 - 40 IU/L Final   11/20/2018 14 10 - 35 U/L Final     ASPAR AMINOTRANSFERASE   Date Value Ref Range Status   06/14/2022 23 15 - 41 U/L Final     GGT   Date Value Ref Range Status   01/03/2025 16 9 - 64 U/L Final     Alkaline Phosphatase   Date Value Ref Range Status   06/19/2025 120 (H) 34 - 104 U/L Final   06/14/2022 85 38 - 126 U/L Final      Lab Results   Component Value Date    BIH0FIHBRIDI 1.778 06/19/2025      Lab Results   Component Value Date    FREET4 0.96 06/19/2025      Lab Results   Component Value Date    [id number] 16.9 (L) 06/19/2025    [id number] 24.0 (L) 01/03/2025    [id number] 22.5 (L) 03/15/2024      Radiology Results Review : No pertinent imaging studies reviewed.  Patient Instructions   Continue Synthroid 100 mcg daily.    Start vitamin D 50,000 units daily.  Will recheck vitamin D in about 6 " months.     Contact the office if there is any change in symptoms.     Follow up in 1 year with labs.    Discussed with the patient and all questioned fully answered. She will call me if any problems arise.           [1]  Current Outpatient Medications on File Prior to Visit   Medication Sig Dispense Refill   • albuterol (PROVENTIL HFA,VENTOLIN HFA) 90 mcg/act inhaler Inhale 2 puffs every 4 (four) hours as needed for wheezing 18 g 3   • budesonide (Pulmicort Flexhaler) 90 MCG/ACT inhaler Inhale 1 puff 2 (two) times a day as needed (wheezing) Rinse mouth after use. 1 each 0   • cholecalciferol (VITAMIN D3) 1,000 units tablet Take 1,000 Units by mouth in the morning.     • Humira, 2 Pen, 40 MG/0.4ML PNKT Inject 1 pen (40 mg) under the skin once a week (every 7 days). 4 each 10   • Immune Globulin, Human, (Hizentra) 10 GM/50ML SOLN Inject 11 g under the skin once a week 220 mL 11   • levocetirizine (Xyzal Allergy 24HR) 5 MG tablet Take 1 tablet (5 mg total) by mouth daily at bedtime 90 tablet 1   • omeprazole (PriLOSEC) 40 MG capsule Take 1 capsule (40 mg total) by mouth daily 90 capsule 1   • Prenatal-FeFum-FA-DHA w/o A (PRENATAL + DHA PO) Take by mouth in the morning     • Synthroid 100 MCG tablet Take 1 tablet (100 mcg total) by mouth daily 90 tablet 1   • methylPREDNISolone 4 MG tablet therapy pack Use as directed on package (Patient not taking: Reported on 6/20/2025) 21 each 0     No current facility-administered medications on file prior to visit.   [2]  Social History  Tobacco Use   • Smoking status: Never     Passive exposure: Never   • Smokeless tobacco: Never   Vaping Use   • Vaping status: Never Used   Substance and Sexual Activity   • Alcohol use: Not Currently     Comment: Social drink.   • Drug use: Never   • Sexual activity: Yes     Partners: Male

## 2025-06-20 NOTE — ASSESSMENT & PLAN NOTE
Recent thyroid lab work came back normal.  Clinically and biochemically euthyroid.  At this time she will continue with Synthroid 100 mcg daily.  She will contact the office if there is any change in symptoms.  At this time since levels have been stable we will extend out office visits to 1 year.  Orders:  •  Comprehensive metabolic panel; Future  •  TSH, 3rd generation; Future  •  T4, free; Future

## 2025-06-20 NOTE — ASSESSMENT & PLAN NOTE
Vitamin D levels were low even on calcium supplement.  At this time we will start her on vitamin D 50,000 units weekly.  Will have her on this for the next 6 months and recheck vitamin D.  Based on that time we will determine how we want to continue managing the vitamin D.  Orders:  •  ergocalciferol (ERGOCALCIFEROL) 1.25 MG (02865 UT) capsule; Take 1 capsule (50,000 Units total) by mouth once a week  •  Vitamin D 25 hydroxy; Future  •  Vitamin D 25 hydroxy; Future  •  Comprehensive metabolic panel; Future

## 2025-06-20 NOTE — PATIENT INSTRUCTIONS
Continue Synthroid 100 mcg daily.    Start vitamin D 50,000 units daily.  Will recheck vitamin D in about 6 months.     Contact the office if there is any change in symptoms.     Follow up in 1 year with labs.

## 2025-07-05 DIAGNOSIS — K21.9 GASTROESOPHAGEAL REFLUX DISEASE, UNSPECIFIED WHETHER ESOPHAGITIS PRESENT: ICD-10-CM

## 2025-07-07 RX ORDER — OMEPRAZOLE 40 MG/1
40 CAPSULE, DELAYED RELEASE ORAL DAILY
Qty: 90 CAPSULE | Refills: 1 | Status: SHIPPED | OUTPATIENT
Start: 2025-07-07

## 2025-08-01 ENCOUNTER — OFFICE VISIT (OUTPATIENT)
Dept: FAMILY MEDICINE CLINIC | Facility: CLINIC | Age: 30
End: 2025-08-01
Payer: COMMERCIAL

## 2025-08-01 VITALS
HEART RATE: 96 BPM | HEIGHT: 66 IN | OXYGEN SATURATION: 99 % | DIASTOLIC BLOOD PRESSURE: 90 MMHG | TEMPERATURE: 98.1 F | BODY MASS INDEX: 41.72 KG/M2 | WEIGHT: 259.6 LBS | SYSTOLIC BLOOD PRESSURE: 134 MMHG

## 2025-08-01 DIAGNOSIS — J45.41 MODERATE PERSISTENT ASTHMA WITH ACUTE EXACERBATION: ICD-10-CM

## 2025-08-01 DIAGNOSIS — J06.9 UPPER RESPIRATORY TRACT INFECTION, UNSPECIFIED TYPE: ICD-10-CM

## 2025-08-01 DIAGNOSIS — R68.83 CHILLS: Primary | ICD-10-CM

## 2025-08-01 LAB
SARS-COV-2 AG UPPER RESP QL IA: NEGATIVE
VALID CONTROL: NORMAL

## 2025-08-01 PROCEDURE — 87811 SARS-COV-2 COVID19 W/OPTIC: CPT | Performed by: FAMILY MEDICINE

## 2025-08-01 PROCEDURE — 99213 OFFICE O/P EST LOW 20 MIN: CPT | Performed by: FAMILY MEDICINE

## 2025-08-01 RX ORDER — METHYLPREDNISOLONE 4 MG/1
TABLET ORAL
Qty: 21 EACH | Refills: 0 | Status: SHIPPED | OUTPATIENT
Start: 2025-08-01

## 2025-08-01 RX ORDER — FLUTICASONE PROPIONATE 50 MCG
1 SPRAY, SUSPENSION (ML) NASAL DAILY
Qty: 16 G | Refills: 0 | Status: SHIPPED | OUTPATIENT
Start: 2025-08-01

## 2025-08-05 DIAGNOSIS — K50.10 CROHN'S DISEASE OF COLON WITHOUT COMPLICATION (HCC): Primary | ICD-10-CM

## 2025-08-06 RX ORDER — ADALIMUMAB 40MG/0.4ML
KIT SUBCUTANEOUS
Qty: 4 EACH | Refills: 10 | Status: SHIPPED | OUTPATIENT
Start: 2025-08-06

## 2025-08-11 ENCOUNTER — TELEPHONE (OUTPATIENT)
Age: 30
End: 2025-08-11

## 2025-08-21 DIAGNOSIS — E06.3 HYPOTHYROIDISM DUE TO HASHIMOTO'S THYROIDITIS: ICD-10-CM

## 2025-08-21 DIAGNOSIS — J31.0 CHRONIC RHINITIS: ICD-10-CM

## 2025-08-21 DIAGNOSIS — J45.40 MODERATE PERSISTENT ASTHMA WITHOUT COMPLICATION: ICD-10-CM

## 2025-08-22 RX ORDER — LEVOTHYROXINE SODIUM 100 MCG
100 TABLET ORAL DAILY
Qty: 90 TABLET | Refills: 1 | Status: SHIPPED | OUTPATIENT
Start: 2025-08-22

## 2025-08-22 RX ORDER — LEVOCETIRIZINE DIHYDROCHLORIDE 5 MG/1
5 TABLET, FILM COATED ORAL
Qty: 90 TABLET | Refills: 1 | Status: SHIPPED | OUTPATIENT
Start: 2025-08-22

## (undated) DEVICE — SUT MONOCRYL 0 CTX 36 IN Y398H

## (undated) DEVICE — CHLORAPREP HI-LITE 26ML ORANGE

## (undated) DEVICE — SUT STRATAFIX SPIRAL PDS PLUS 1 CTX 18 IN SXPP1A400

## (undated) DEVICE — SUT STRATAFIX SPIRAL 4-0 PGA/PCL 30 X 30 CM SXMD2B409

## (undated) DEVICE — GLOVE SRG BIOGEL ECLIPSE 7

## (undated) DEVICE — PACK C-SECTION PBDS

## (undated) DEVICE — SKIN MARKER DUAL TIP WITH RULER CAP, FLEXIBLE RULER AND LABELS: Brand: DEVON

## (undated) DEVICE — SUT PLAIN 3-0 CT-1 27 IN 842H

## (undated) DEVICE — GLOVE INDICATOR PI UNDERGLOVE SZ 7.5 BLUE

## (undated) DEVICE — EXOFIN PRECISION PEN HIGH VISCOSITY TOPICAL SKIN ADHESIVE: Brand: EXOFIN PRECISION PEN, 1G

## (undated) DEVICE — Device